# Patient Record
Sex: FEMALE | Race: WHITE | Employment: OTHER | URBAN - METROPOLITAN AREA
[De-identification: names, ages, dates, MRNs, and addresses within clinical notes are randomized per-mention and may not be internally consistent; named-entity substitution may affect disease eponyms.]

---

## 2019-11-11 LAB
ALBUMIN SERPL-MCNC: NORMAL G/DL
ALP BLD-CCNC: NORMAL U/L
ALT SERPL-CCNC: NORMAL U/L
ANION GAP SERPL CALCULATED.3IONS-SCNC: NORMAL MMOL/L
AST SERPL-CCNC: NORMAL U/L
AVERAGE GLUCOSE: 123
BILIRUB SERPL-MCNC: NORMAL MG/DL
BUN BLDV-MCNC: NORMAL MG/DL
CALCIUM SERPL-MCNC: NORMAL MG/DL
CHLORIDE BLD-SCNC: NORMAL MMOL/L
CHOLESTEROL, TOTAL: 173 MG/DL
CHOLESTEROL/HDL RATIO: 4.8
CO2: NORMAL
CREAT SERPL-MCNC: NORMAL MG/DL
GFR CALCULATED: NORMAL
GLUCOSE BLD-MCNC: NORMAL MG/DL
HBA1C MFR BLD: 5.9 %
HDLC SERPL-MCNC: 36 MG/DL (ref 35–70)
LDL CHOLESTEROL CALCULATED: 90 MG/DL (ref 0–160)
NONHDLC SERPL-MCNC: NORMAL MG/DL
POTASSIUM SERPL-SCNC: NORMAL MMOL/L
SODIUM BLD-SCNC: NORMAL MMOL/L
TOTAL PROTEIN: NORMAL
TRIGL SERPL-MCNC: 237 MG/DL
VITAMIN B-12: 403
VITAMIN D 25-HYDROXY: 30
VITAMIN D2, 25 HYDROXY: NORMAL
VITAMIN D3,25 HYDROXY: NORMAL
VLDLC SERPL CALC-MCNC: 47 MG/DL

## 2020-09-10 VITALS
DIASTOLIC BLOOD PRESSURE: 70 MMHG | SYSTOLIC BLOOD PRESSURE: 118 MMHG | TEMPERATURE: 96.5 F | WEIGHT: 222 LBS | BODY MASS INDEX: 31.4 KG/M2 | HEART RATE: 72 BPM | RESPIRATION RATE: 14 BRPM

## 2020-09-24 VITALS
DIASTOLIC BLOOD PRESSURE: 74 MMHG | SYSTOLIC BLOOD PRESSURE: 112 MMHG | RESPIRATION RATE: 14 BRPM | HEART RATE: 70 BPM | BODY MASS INDEX: 31.26 KG/M2 | WEIGHT: 221 LBS | TEMPERATURE: 95.9 F

## 2020-11-09 ENCOUNTER — HOSPITAL ENCOUNTER (OUTPATIENT)
Dept: GENERAL RADIOLOGY | Age: 65
Discharge: HOME | End: 2020-11-11
Payer: MEDICARE

## 2020-11-09 PROCEDURE — 77063 BREAST TOMOSYNTHESIS BI: CPT

## 2021-01-06 ENCOUNTER — TELEPHONE (OUTPATIENT)
Dept: FAMILY MEDICINE CLINIC | Age: 66
End: 2021-01-06

## 2021-01-06 NOTE — TELEPHONE ENCOUNTER
Patient called to RS appt, stating she woke up with a sore throat, headache, matter in her eyes and not feeling well. Informed patient of [facility]. RS appt to 2/15/21.

## 2021-02-15 ENCOUNTER — OFFICE VISIT (OUTPATIENT)
Dept: FAMILY MEDICINE CLINIC | Age: 66
End: 2021-02-15
Payer: MEDICARE

## 2021-02-15 VITALS
HEIGHT: 69 IN | DIASTOLIC BLOOD PRESSURE: 80 MMHG | BODY MASS INDEX: 31.84 KG/M2 | HEART RATE: 105 BPM | OXYGEN SATURATION: 95 % | SYSTOLIC BLOOD PRESSURE: 138 MMHG | WEIGHT: 215 LBS | TEMPERATURE: 98.2 F

## 2021-02-15 DIAGNOSIS — J44.9 CHRONIC OBSTRUCTIVE PULMONARY DISEASE, UNSPECIFIED COPD TYPE (HCC): ICD-10-CM

## 2021-02-15 DIAGNOSIS — Z12.11 COLON CANCER SCREENING: ICD-10-CM

## 2021-02-15 DIAGNOSIS — R06.00 DYSPNEA, UNSPECIFIED TYPE: ICD-10-CM

## 2021-02-15 DIAGNOSIS — G62.9 NEUROPATHY: ICD-10-CM

## 2021-02-15 DIAGNOSIS — Z00.00 ANNUAL PHYSICAL EXAM: Primary | ICD-10-CM

## 2021-02-15 DIAGNOSIS — F32.A DEPRESSION, UNSPECIFIED DEPRESSION TYPE: ICD-10-CM

## 2021-02-15 DIAGNOSIS — I25.10 CORONARY ARTERY DISEASE INVOLVING NATIVE CORONARY ARTERY OF NATIVE HEART WITHOUT ANGINA PECTORIS: ICD-10-CM

## 2021-02-15 DIAGNOSIS — E78.5 HYPERLIPIDEMIA, UNSPECIFIED HYPERLIPIDEMIA TYPE: ICD-10-CM

## 2021-02-15 DIAGNOSIS — M54.50 CHRONIC BILATERAL LOW BACK PAIN WITHOUT SCIATICA: ICD-10-CM

## 2021-02-15 DIAGNOSIS — G89.29 CHRONIC BILATERAL LOW BACK PAIN WITHOUT SCIATICA: ICD-10-CM

## 2021-02-15 DIAGNOSIS — R73.9 HYPERGLYCEMIA: ICD-10-CM

## 2021-02-15 DIAGNOSIS — I10 ESSENTIAL HYPERTENSION: ICD-10-CM

## 2021-02-15 DIAGNOSIS — G47.00 PERSISTENT INSOMNIA: ICD-10-CM

## 2021-02-15 LAB
BILIRUBIN, POC: NORMAL
BLOOD URINE, POC: NORMAL
CLARITY, POC: NORMAL
COLOR, POC: NORMAL
GLUCOSE URINE, POC: NORMAL
KETONES, POC: NORMAL
LEUKOCYTE EST, POC: NORMAL
NITRITE, POC: NORMAL
PH, POC: 5.5
PROTEIN, POC: NORMAL
SPECIFIC GRAVITY, POC: 1.03
UROBILINOGEN, POC: 0.2

## 2021-02-15 PROCEDURE — G0403 EKG FOR INITIAL PREVENT EXAM: HCPCS | Performed by: INTERNAL MEDICINE

## 2021-02-15 PROCEDURE — G8484 FLU IMMUNIZE NO ADMIN: HCPCS | Performed by: INTERNAL MEDICINE

## 2021-02-15 PROCEDURE — 81002 URINALYSIS NONAUTO W/O SCOPE: CPT | Performed by: INTERNAL MEDICINE

## 2021-02-15 PROCEDURE — G0402 INITIAL PREVENTIVE EXAM: HCPCS | Performed by: INTERNAL MEDICINE

## 2021-02-15 RX ORDER — METOPROLOL SUCCINATE 50 MG/1
50 TABLET, EXTENDED RELEASE ORAL 2 TIMES DAILY
Qty: 180 TABLET | Refills: 1 | Status: SHIPPED
Start: 2021-02-15 | End: 2021-05-25 | Stop reason: SDUPTHER

## 2021-02-15 RX ORDER — CLOPIDOGREL BISULFATE 75 MG/1
75 TABLET ORAL DAILY
Qty: 90 TABLET | Refills: 1 | Status: SHIPPED
Start: 2021-02-15 | End: 2021-05-25 | Stop reason: SDUPTHER

## 2021-02-15 RX ORDER — DULOXETIN HYDROCHLORIDE 60 MG/1
60 CAPSULE, DELAYED RELEASE ORAL DAILY
Qty: 90 CAPSULE | Refills: 1 | Status: SHIPPED
Start: 2021-02-15 | End: 2021-05-25 | Stop reason: SDUPTHER

## 2021-02-15 RX ORDER — GABAPENTIN 300 MG/1
600 CAPSULE ORAL NIGHTLY
Qty: 90 CAPSULE | Refills: 1 | Status: SHIPPED
Start: 2021-02-15 | End: 2021-05-25 | Stop reason: SDUPTHER

## 2021-02-15 RX ORDER — HYDROCODONE BITARTRATE AND ACETAMINOPHEN 7.5; 325 MG/1; MG/1
1 TABLET ORAL EVERY 8 HOURS PRN
Qty: 90 TABLET | Refills: 0 | Status: SHIPPED | OUTPATIENT
Start: 2021-02-15 | End: 2021-03-17

## 2021-02-15 RX ORDER — CLOPIDOGREL BISULFATE 75 MG/1
75 TABLET ORAL DAILY
COMMUNITY
End: 2021-02-15 | Stop reason: SDUPTHER

## 2021-02-15 RX ORDER — HYDROCODONE BITARTRATE AND ACETAMINOPHEN 7.5; 325 MG/1; MG/1
1 TABLET ORAL EVERY 6 HOURS PRN
COMMUNITY
End: 2021-02-15 | Stop reason: SDUPTHER

## 2021-02-15 RX ORDER — GABAPENTIN 100 MG/1
200 CAPSULE ORAL 2 TIMES DAILY
Qty: 360 CAPSULE | Refills: 1 | Status: SHIPPED
Start: 2021-02-15 | End: 2021-05-25

## 2021-02-15 RX ORDER — ATORVASTATIN CALCIUM 10 MG/1
10 TABLET, FILM COATED ORAL DAILY
Qty: 90 TABLET | Refills: 1 | Status: SHIPPED
Start: 2021-02-15 | End: 2021-05-25 | Stop reason: SDUPTHER

## 2021-02-15 SDOH — ECONOMIC STABILITY: FOOD INSECURITY: WITHIN THE PAST 12 MONTHS, YOU WORRIED THAT YOUR FOOD WOULD RUN OUT BEFORE YOU GOT MONEY TO BUY MORE.: NEVER TRUE

## 2021-02-15 SDOH — ECONOMIC STABILITY: INCOME INSECURITY: HOW HARD IS IT FOR YOU TO PAY FOR THE VERY BASICS LIKE FOOD, HOUSING, MEDICAL CARE, AND HEATING?: NOT ASKED

## 2021-02-15 SDOH — ECONOMIC STABILITY: FOOD INSECURITY: WITHIN THE PAST 12 MONTHS, THE FOOD YOU BOUGHT JUST DIDN'T LAST AND YOU DIDN'T HAVE MONEY TO GET MORE.: NEVER TRUE

## 2021-02-15 ASSESSMENT — PATIENT HEALTH QUESTIONNAIRE - PHQ9
SUM OF ALL RESPONSES TO PHQ QUESTIONS 1-9: 0
SUM OF ALL RESPONSES TO PHQ9 QUESTIONS 1 & 2: 0
SUM OF ALL RESPONSES TO PHQ QUESTIONS 1-9: 0

## 2021-02-15 NOTE — PROGRESS NOTES
Subjective:     Chief Complaint   Patient presents with    Annual Exam   Patient here for follow-up on hypertension, coronary disease and peripheral neuropathy  Has trouble sleeping at night she has cut down the caffeine still not helping  Seen by cardiologist in December 2020 she follows once a year no further changes    Seen by Dr. Jazzy Leal he could not do complete colonoscopy due to tortuous colon and adhesions she has done in [facility] by Dr. 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     Neuropathy         Social History     Socioeconomic History    Marital status:       Spouse name: Not on file    Number of children: Not on file    Years of education: Not on file    Highest education level: Not on file   Occupational History    Not on file   Social Needs    Financial resource strain: Not on file    Food insecurity     Worry: Never true     Inability: Never true    Transportation needs     Medical: No     Non-medical: No   Tobacco Use    Smoking status: Former Smoker     Packs/day: 1.00     Years: 27.00     Pack years: 27.00     Quit date: 2011     Years since quitting: 10.1    Smokeless tobacco: Never Used   Substance and Sexual Activity    Alcohol use: No    Drug use: No    Sexual activity: Not on file   Lifestyle    Physical activity     Days per week: Not on file     Minutes per session: Not on file    Stress: Not on file   Relationships    Social connections     Talks on phone: Not on file     Gets together: Not on file Neck: no JVD adenopathy no bruit  Heart:  RRR, no murmurs, gallops, or rubs. Lungs:    no wheeze, rales or rhonchi  Abd: bowel sounds present, nontender, nondistended, no masses  Extrem:  No clubbing, cyanosis, or edema  Neuro: AAOx3,No Focal deficit has chronic peripheral neuropathy doing better  Psychological: no depression or anxiety   Has chronic back pain radiculopathy uses Norco as needed declined to go to pain clinic    Current Outpatient Medications   Medication Sig Dispense Refill    gabapentin (NEURONTIN) 300 MG capsule Take 2 capsules by mouth nightly for 180 days. 90 capsule 1    gabapentin (NEURONTIN) 100 MG capsule Take 2 capsules by mouth 2 times daily for 180 days. 360 capsule 1    DULoxetine (CYMBALTA) 60 MG extended release capsule Take 1 capsule by mouth daily 90 capsule 1    clopidogrel (PLAVIX) 75 MG tablet Take 1 tablet by mouth daily 90 tablet 1    atorvastatin (LIPITOR) 10 MG tablet Take 1 tablet by mouth daily 90 tablet 1    HYDROcodone-acetaminophen (NORCO) 7.5-325 MG per tablet Take 1 tablet by mouth every 8 hours as needed for Pain for up to 30 days. 90 tablet 0    metoprolol succinate (TOPROL XL) 50 MG extended release tablet Take 1 tablet by mouth 2 times daily 180 tablet 1    metoprolol (LOPRESSOR) 25 MG tablet Take 25 mg by mouth 2 times daily      aspirin 81 MG tablet Take 81 mg by mouth daily       No current facility-administered medications for this visit.          Last 3 BMP  Lab Results   Component Value Date/Time     02/15/2016 10:12 AM     02/17/2014 09:15 AM     02/13/2012 10:20 AM    K 4.2 02/15/2016 10:12 AM    K 4.3 02/17/2014 09:15 AM    K 4.5 02/13/2012 10:20 AM     02/15/2016 10:12 AM     02/17/2014 09:15 AM     02/13/2012 10:20 AM    CO2 26 02/15/2016 10:12 AM    CO2 30 (H) 02/17/2014 09:15 AM    CO2 27 02/13/2012 10:20 AM    BUN 8 02/15/2016 10:12 AM    BUN 14 02/17/2014 09:15 AM    BUN 11 02/13/2012 10:20 AM MCV 89.4 02/15/2016 10:12 AM    MCH 29.2 02/15/2016 10:12 AM    MCHC 32.6 02/15/2016 10:12 AM    RDW 14.6 02/15/2016 10:12 AM     02/15/2016 10:12 AM    MPV 8.5 02/15/2016 10:12 AM       A1C:  Lab Results   Component Value Date/Time    LABA1C 5.9 11/11/2019       Lipid panel:  Lab Results   Component Value Date    CHOL 173 11/11/2019    CHOL 370 02/15/2016    CHOL 265 02/17/2014    CHOL 254 02/13/2012    TRIG 237 11/11/2019    TRIG 566 02/15/2016    TRIG 407 02/17/2014    HDL 36 11/11/2019    HDL 37 02/15/2016    HDL 37 02/17/2014        Lab Results   Component Value Date/Time    PROT 6.6 02/15/2016 10:12 AM    PROT 7.1 02/17/2014 09:15 AM    PROT 7.0 02/13/2012 10:20 AM       No results found for: MG      Juan Washington was seen today for annual exam.    Diagnoses and all orders for this visit:    Annual physical exam  -     EKG 12 Lead; Future  -     POCT Urinalysis no Micro  -     EKG 12 Lead    Essential hypertension    Coronary artery disease involving native coronary artery of native heart without angina pectoris    Hyperlipidemia, unspecified hyperlipidemia type  -     Comprehensive Metabolic Panel; Future  -     Lipid Panel; Future  -     TSH without Reflex; Future    Neuropathy  -     HYDROcodone-acetaminophen (NORCO) 7.5-325 MG per tablet; Take 1 tablet by mouth every 8 hours as needed for Pain for up to 30 days. -     CBC Auto Differential; Future    Depression, unspecified depression type    Chronic bilateral low back pain without sciatica  -     HYDROcodone-acetaminophen (NORCO) 7.5-325 MG per tablet; Take 1 tablet by mouth every 8 hours as needed for Pain for up to 30 days. -     CBC Auto Differential; Future    Colon cancer screening  -     External Referral To Gastroenterology    Hyperglycemia  -     Hemoglobin A1C; Future    Chronic obstructive pulmonary disease, unspecified COPD type ([facility]  -     XR CHEST (2 VW); Future    Dyspnea, unspecified type  -     XR CHEST (2 VW);  Future Persistent insomnia    Other orders  -     gabapentin (NEURONTIN) 300 MG capsule; Take 2 capsules by mouth nightly for 180 days.  -     gabapentin (NEURONTIN) 100 MG capsule; Take 2 capsules by mouth 2 times daily for 180 days. -     DULoxetine (CYMBALTA) 60 MG extended release capsule; Take 1 capsule by mouth daily  -     clopidogrel (PLAVIX) 75 MG tablet; Take 1 tablet by mouth daily  -     atorvastatin (LIPITOR) 10 MG tablet; Take 1 tablet by mouth daily  -     metoprolol succinate (TOPROL XL) 50 MG extended release tablet; Take 1 tablet by mouth 2 times daily       There is no problem list on file for this patient. Plan: Referred to [facility] [employer] [facility] Dr. [address] Pulliam for follow-up colonoscopy    Coronary artery disease is stable continue metoprolol 50 mg twice a day  Continue Lipitor and Plavix seen by cardiologist 2 months ago  EKG shows sinus rhythm occasional PVC    Chest x-ray heavy smoker in the past and COPD    Continue gabapentin cardiomyopathy doing better stable she takes gabapentin 100 mg 2 twice daily and 300 mg 2 tablets at night    She had a mammogram in November which was okay    Depression very well controlled on Cymbalta    Lifestyle diet modification discussed      Vaccination discussed      Check CBC CMP lipid A1c      Call if any new symptoms    Over-the-counter melatonin for insomnia        Return in about 3 months (around 5/15/2021).        Corrine Wood MD  3:05 PM  2/15/2021     DE

## 2021-03-25 RX ORDER — CYCLOBENZAPRINE HCL 10 MG
TABLET ORAL
COMMUNITY
Start: 2021-03-24 | End: 2021-03-25 | Stop reason: SDUPTHER

## 2021-03-25 RX ORDER — CYCLOBENZAPRINE HCL 10 MG
10 TABLET ORAL DAILY
Qty: 90 TABLET | Refills: 0 | Status: SHIPPED
Start: 2021-03-25 | End: 2021-05-25 | Stop reason: SDUPTHER

## 2021-05-25 ENCOUNTER — OFFICE VISIT (OUTPATIENT)
Dept: FAMILY MEDICINE CLINIC | Age: 66
End: 2021-05-25
Payer: MEDICARE

## 2021-05-25 VITALS
SYSTOLIC BLOOD PRESSURE: 120 MMHG | TEMPERATURE: 98 F | WEIGHT: 205 LBS | HEART RATE: 47 BPM | OXYGEN SATURATION: 95 % | BODY MASS INDEX: 30.27 KG/M2 | DIASTOLIC BLOOD PRESSURE: 80 MMHG

## 2021-05-25 DIAGNOSIS — I25.10 CORONARY ARTERY DISEASE INVOLVING NATIVE CORONARY ARTERY OF NATIVE HEART WITHOUT ANGINA PECTORIS: ICD-10-CM

## 2021-05-25 DIAGNOSIS — R73.9 HYPERGLYCEMIA: ICD-10-CM

## 2021-05-25 DIAGNOSIS — G62.9 NEUROPATHY: ICD-10-CM

## 2021-05-25 DIAGNOSIS — M19.90 INFLAMMATORY ARTHRITIS: Primary | ICD-10-CM

## 2021-05-25 DIAGNOSIS — M15.9 GENERALIZED OSTEOARTHRITIS: ICD-10-CM

## 2021-05-25 DIAGNOSIS — E78.5 HYPERLIPIDEMIA, UNSPECIFIED HYPERLIPIDEMIA TYPE: ICD-10-CM

## 2021-05-25 DIAGNOSIS — R05.9 COUGH: ICD-10-CM

## 2021-05-25 DIAGNOSIS — M79.10 MYALGIA: ICD-10-CM

## 2021-05-25 PROCEDURE — 3017F COLORECTAL CA SCREEN DOC REV: CPT | Performed by: INTERNAL MEDICINE

## 2021-05-25 PROCEDURE — 99214 OFFICE O/P EST MOD 30 MIN: CPT | Performed by: INTERNAL MEDICINE

## 2021-05-25 PROCEDURE — G8399 PT W/DXA RESULTS DOCUMENT: HCPCS | Performed by: INTERNAL MEDICINE

## 2021-05-25 PROCEDURE — 1090F PRES/ABSN URINE INCON ASSESS: CPT | Performed by: INTERNAL MEDICINE

## 2021-05-25 PROCEDURE — 1123F ACP DISCUSS/DSCN MKR DOCD: CPT | Performed by: INTERNAL MEDICINE

## 2021-05-25 PROCEDURE — 4040F PNEUMOC VAC/ADMIN/RCVD: CPT | Performed by: INTERNAL MEDICINE

## 2021-05-25 PROCEDURE — G8427 DOCREV CUR MEDS BY ELIG CLIN: HCPCS | Performed by: INTERNAL MEDICINE

## 2021-05-25 PROCEDURE — 4004F PT TOBACCO SCREEN RCVD TLK: CPT | Performed by: INTERNAL MEDICINE

## 2021-05-25 PROCEDURE — G8417 CALC BMI ABV UP PARAM F/U: HCPCS | Performed by: INTERNAL MEDICINE

## 2021-05-25 RX ORDER — CLOPIDOGREL BISULFATE 75 MG/1
75 TABLET ORAL DAILY
Qty: 90 TABLET | Refills: 1 | Status: SHIPPED
Start: 2021-05-25 | End: 2021-12-02 | Stop reason: SDUPTHER

## 2021-05-25 RX ORDER — HYDROCODONE BITARTRATE AND ACETAMINOPHEN 7.5; 325 MG/1; MG/1
1 TABLET ORAL EVERY 8 HOURS PRN
Qty: 90 TABLET | Refills: 0 | Status: SHIPPED | OUTPATIENT
Start: 2021-05-25 | End: 2021-06-24

## 2021-05-25 RX ORDER — DULOXETIN HYDROCHLORIDE 60 MG/1
60 CAPSULE, DELAYED RELEASE ORAL DAILY
Qty: 90 CAPSULE | Refills: 1 | Status: SHIPPED
Start: 2021-05-25 | End: 2021-12-02 | Stop reason: SDUPTHER

## 2021-05-25 RX ORDER — CYCLOBENZAPRINE HCL 10 MG
10 TABLET ORAL DAILY
Qty: 90 TABLET | Refills: 0 | Status: SHIPPED
Start: 2021-05-25 | End: 2021-12-02 | Stop reason: SDUPTHER

## 2021-05-25 RX ORDER — GABAPENTIN 300 MG/1
300 CAPSULE ORAL 2 TIMES DAILY
Qty: 60 CAPSULE | Refills: 5 | Status: SHIPPED
Start: 2021-05-25 | End: 2021-08-25

## 2021-05-25 RX ORDER — METOPROLOL SUCCINATE 50 MG/1
50 TABLET, EXTENDED RELEASE ORAL 2 TIMES DAILY
Qty: 180 TABLET | Refills: 1 | Status: SHIPPED
Start: 2021-05-25 | End: 2021-12-02 | Stop reason: SDUPTHER

## 2021-05-25 RX ORDER — CELECOXIB 200 MG/1
200 CAPSULE ORAL DAILY
Qty: 30 CAPSULE | Refills: 3 | Status: SHIPPED
Start: 2021-05-25 | End: 2021-08-25 | Stop reason: SDUPTHER

## 2021-05-25 RX ORDER — GABAPENTIN 100 MG/1
200 CAPSULE ORAL 2 TIMES DAILY
Qty: 360 CAPSULE | Refills: 1 | Status: CANCELLED | OUTPATIENT
Start: 2021-05-25 | End: 2021-11-21

## 2021-05-25 RX ORDER — ATORVASTATIN CALCIUM 10 MG/1
10 TABLET, FILM COATED ORAL DAILY
Qty: 90 TABLET | Refills: 1 | Status: SHIPPED
Start: 2021-05-25 | End: 2021-12-02 | Stop reason: SDUPTHER

## 2021-05-25 RX ORDER — GABAPENTIN 300 MG/1
300 CAPSULE ORAL 3 TIMES DAILY
Qty: 270 CAPSULE | Refills: 1 | Status: SHIPPED
Start: 2021-05-25 | End: 2021-12-02 | Stop reason: SDUPTHER

## 2021-05-25 NOTE — PROGRESS NOTES
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[facility]     Neuropathy         Social History     Socioeconomic History    Marital status:      Spouse name: Not on file    Number of children: Not on file    Years of education: Not on file    Highest education level: Not on file   Occupational History    Not on file   Tobacco Use    Smoking status: Former Smoker     Packs/day: 1.00     Years: 27.00     Pack years: 27.00     Quit date: 2011     Years since quitting: 10.4    Smokeless tobacco: Never Used   Substance and Sexual Activity    Alcohol use: No    Drug use: No    Sexual activity: Not on file   Other Topics Concern    Not on file   Social History Narrative    Not on file     Social Determinants of Health     Financial Resource Strain:     Difficulty of Paying Living Expenses:    Food Insecurity: No Food Insecurity    Worried About Running Out of Food in the Last Year: Never true    Sadie of Food in the Last Year: Never true   Transportation Needs: No Transportation Needs    Lack of Transportation (Medical): No    Lack of Transportation (Non-Medical):  No   Physical Activity:     Days of Exercise per Week:     Minutes of Exercise per Session:    Stress:     Feeling of Stress :    Social Connections:     Frequency of Communication with Friends and Family:     Frequency of Social Gatherings with Friends and Family:     Attends Mu-ism Services:     Active Member of Clubs or Organizations:     Attends Club or Organization Meetings:     Marital Status:    Intimate Partner Violence:     Fear of Current or Ex-Partner:     Emotionally Abused:     Physically Abused:     Sexually Abused:         Past Surgical History:   Procedure Laterality Date    ABDOMINAL EXPLORATION SURGERY      APPENDECTOMY      CHOLECYSTECTOMY      CORONARY ANGIOPLASTY WITH STENT PLACEMENT  2014    HYSTERECTOMY      NECK SURGERY      PARTIAL HYSTERECTOMY      TONSILLECTOMY          Family History   Problem Relation Age of Onset    Early Death Mother     Heart Disease Mother     Colon Cancer Father 80        Allergies   Allergen Reactions    Iodine Anaphylaxis and Hives    Evista [Raloxifene Hcl]         ROS  No acute distress  Cardiac: Denies any chest pain or palpitation  Seen by cardiologist December 2020 he sees her once a year  Stent RCA 2014 by Dr. Sonal Khoury  Respiratory: Denies any cough or shortness of breath  Did not do the chest x-ray  Heavy smoker in the past, history of COPD  GI: No abdominal pain. Denies any nausea vomiting or diarrhea  Colonoscopy in [facility] [employer] [facility] clinic March 2021 done by Dr. Brianna Garcia colon polyp adenoma repeat in 5 years  : Denies any dysuria frequency or hematuria  Neuro: No headache or dizziness  Feet are very sensitive due to neuropathy  Endocrine: No diabetes  Skin: normal  No recent weight gain or weight loss  Denies any change in vision    Chronic back pain, shoulder pain, neck pain,    Objective:    /80   Pulse (!) 47   Temp 98 °F (36.7 °C)   Wt 205 lb (93 kg)   SpO2 95%   BMI 30.27 kg/m²     Constitutional: Alert awake and oriented  Eyes: Pupils equal bilaterally. Extraocular muscles intact  Neck: no JVD adenopathy no bruit  Heart:  RRR, no murmurs, gallops, or rubs.   Lungs:    no wheeze, rales or rhonchi  Abd: bowel sounds present, nontender, nondistended, no masses  Extrem:  No clubbing, cyanosis, or edema  Neuro: AAOx3,No Focal deficit  Psychological: no depression or anxiety   Musculoskeletal slight deformity of the hands  Range of motion shoulder elbow hips are painful    Current Outpatient 02/17/2014 09:15 AM    CALCIUM 9.6 02/13/2012 10:20 AM       Last 3 CMP:    Lab Results   Component Value Date/Time     02/15/2016 10:12 AM     02/17/2014 09:15 AM     02/13/2012 10:20 AM    K 4.2 02/15/2016 10:12 AM    K 4.3 02/17/2014 09:15 AM    K 4.5 02/13/2012 10:20 AM     02/15/2016 10:12 AM     02/17/2014 09:15 AM     02/13/2012 10:20 AM    CO2 26 02/15/2016 10:12 AM    CO2 30 (H) 02/17/2014 09:15 AM    CO2 27 02/13/2012 10:20 AM    BUN 8 02/15/2016 10:12 AM    BUN 14 02/17/2014 09:15 AM    BUN 11 02/13/2012 10:20 AM    CREATININE 0.7 02/15/2016 10:12 AM    CREATININE 0.7 02/17/2014 09:15 AM    CREATININE 0.7 02/13/2012 10:20 AM    GLUCOSE 111 (H) 02/15/2016 10:12 AM    GLUCOSE 101 02/17/2014 09:15 AM    GLUCOSE 97 02/13/2012 10:20 AM    CALCIUM 8.9 02/15/2016 10:12 AM    CALCIUM 9.4 02/17/2014 09:15 AM    CALCIUM 9.6 02/13/2012 10:20 AM    PROT 6.6 02/15/2016 10:12 AM    PROT 7.1 02/17/2014 09:15 AM    PROT 7.0 02/13/2012 10:20 AM    LABALBU 4.2 02/15/2016 10:12 AM    LABALBU 4.2 02/17/2014 09:15 AM    LABALBU 4.5 02/13/2012 10:20 AM    BILITOT 0.3 02/15/2016 10:12 AM    BILITOT 0.2 02/17/2014 09:15 AM    BILITOT 0.3 02/13/2012 10:20 AM    ALKPHOS 111 (H) 02/15/2016 10:12 AM    ALKPHOS 100 02/17/2014 09:15 AM    ALKPHOS 108 02/13/2012 10:20 AM    AST 25 02/15/2016 10:12 AM    AST 18 02/17/2014 09:15 AM    AST 17 02/13/2012 10:20 AM    ALT 23 02/15/2016 10:12 AM    ALT 17 02/17/2014 09:15 AM    ALT 18 02/13/2012 10:20 AM        CBC:   Lab Results   Component Value Date/Time    WBC 4.7 02/15/2016 10:12 AM    RBC 5.04 02/15/2016 10:12 AM    HGB 14.7 02/15/2016 10:12 AM    HCT 45.0 02/15/2016 10:12 AM    MCV 89.4 02/15/2016 10:12 AM    MCH 29.2 02/15/2016 10:12 AM    MCHC 32.6 02/15/2016 10:12 AM    RDW 14.6 02/15/2016 10:12 AM     02/15/2016 10:12 AM    MPV 8.5 02/15/2016 10:12 AM       A1C:  Lab Results   Component Value Date/Time    LABA1C 5.9 11/11/2019 12:00 AM Lipid panel:  Lab Results   Component Value Date    CHOL 173 11/11/2019    CHOL 370 02/15/2016    CHOL 265 02/17/2014    CHOL 254 02/13/2012    TRIG 237 11/11/2019    TRIG 566 02/15/2016    TRIG 407 02/17/2014    HDL 36 11/11/2019    HDL 37 02/15/2016    HDL 37 02/17/2014        Lab Results   Component Value Date/Time    PROT 6.6 02/15/2016 10:12 AM    PROT 7.1 02/17/2014 09:15 AM    PROT 7.0 02/13/2012 10:20 AM       No results found for: MG      Assessment. Carmela Davila was seen today for joint pain and discuss medications. Diagnoses and all orders for this visit:    Inflammatory arthritis  -     CBC WITH AUTO DIFFERENTIAL; Future  -     SEDIMENTATION RATE; Future  -     RHEUMATOID FACTOR; Future  -     C-REACTIVE PROTEIN; Future  -     DONNA; Future  -     HYDROcodone-acetaminophen (NORCO) 7.5-325 MG per tablet; Take 1 tablet by mouth every 8 hours as needed for Pain for up to 30 days. Intended supply: 3 days. Take lowest dose possible to manage pain    Neuropathy  -     VITAMIN B12 & FOLATE; Future  -     HYDROcodone-acetaminophen (NORCO) 7.5-325 MG per tablet; Take 1 tablet by mouth every 8 hours as needed for Pain for up to 30 days. Intended supply: 3 days. Take lowest dose possible to manage pain    Hyperlipidemia, unspecified hyperlipidemia type  -     COMPREHENSIVE METABOLIC PANEL; Future  -     LIPID PANEL; Future  -     TSH; Future    Coronary artery disease involving native coronary artery of native heart without angina pectoris    Hyperglycemia  -     HEMOGLOBIN A1C; Future    Cough  -     XR CHEST (2 VW); Future    Generalized osteoarthritis    Other orders  -     metoprolol succinate (TOPROL XL) 50 MG extended release tablet; Take 1 tablet by mouth 2 times daily  -     gabapentin (NEURONTIN) 300 MG capsule; Take 1 capsule by mouth 3 times daily for 90 days. -     DULoxetine (CYMBALTA) 60 MG extended release capsule; Take 1 capsule by mouth daily  -     cyclobenzaprine (FLEXERIL) 10 MG tablet;  Take 1 tablet by mouth daily  -     clopidogrel (PLAVIX) 75 MG tablet; Take 1 tablet by mouth daily  -     atorvastatin (LIPITOR) 10 MG tablet; Take 1 tablet by mouth daily  -     gabapentin (NEURONTIN) 300 MG capsule; Take 1 capsule by mouth 2 times daily for 180 days. Intended supply: 30 days  -     celecoxib (CELEBREX) 200 MG capsule; Take 1 capsule by mouth daily       Patient Active Problem List   Diagnosis    Neuropathy    Hyperlipidemia    Coronary artery disease involving native coronary artery of native heart without angina pectoris    Generalized osteoarthritis       Plan: Generalized osteoarthritis rule out inflammatory arthritis check DONNA, ESR, CRP, and sed rate    Generalized arthritis and chronic back pain try Celebrex 200 mg daily see if that helps    Neuropathy getting worse check A1c, B12, increase gabapentin to 300 in the morning 600 night      CAD stable sees cardiologist Dr. Navarro Miss      Mild cough with history of COPD and heavy smoker x-ray chest requested      Chronic back pain and generalized arthritis prescription for Norco 7.5/325 30 tablets    Hyperlipidemia check CMP and lipid profile    Check CPK wrist generalized pain      Depression anxiety doing better with Cymbalta    Return in about 3 months (around 8/25/2021).        Deanne Grant MD  2:56 PM  5/25/2021     DE

## 2021-05-26 RX ORDER — CELECOXIB 200 MG/1
200 CAPSULE ORAL DAILY
Qty: 90 CAPSULE | OUTPATIENT
Start: 2021-05-26

## 2021-06-30 ENCOUNTER — TELEPHONE (OUTPATIENT)
Dept: FAMILY MEDICINE CLINIC | Age: 66
End: 2021-06-30

## 2021-06-30 NOTE — TELEPHONE ENCOUNTER
Left message for patient that dr changed meds at last visit and called in gabapentin 300mg one in the morning and 2 at bedtime.  Medication was sent to [facility]

## 2021-07-12 DIAGNOSIS — R73.9 HYPERGLYCEMIA: ICD-10-CM

## 2021-07-12 DIAGNOSIS — M79.10 MYALGIA: ICD-10-CM

## 2021-07-12 DIAGNOSIS — G62.9 NEUROPATHY: ICD-10-CM

## 2021-07-12 DIAGNOSIS — E78.5 HYPERLIPIDEMIA, UNSPECIFIED HYPERLIPIDEMIA TYPE: ICD-10-CM

## 2021-07-12 DIAGNOSIS — M19.90 INFLAMMATORY ARTHRITIS: ICD-10-CM

## 2021-07-12 LAB
ALBUMIN SERPL-MCNC: 3.8 G/DL (ref 3.5–5.2)
ALP BLD-CCNC: 109 U/L (ref 35–104)
ALT SERPL-CCNC: 14 U/L (ref 0–32)
ANION GAP SERPL CALCULATED.3IONS-SCNC: 12 MMOL/L (ref 7–16)
AST SERPL-CCNC: 15 U/L (ref 0–31)
BASOPHILS ABSOLUTE: 0.04 E9/L (ref 0–0.2)
BASOPHILS RELATIVE PERCENT: 0.5 % (ref 0–2)
BILIRUB SERPL-MCNC: <0.2 MG/DL (ref 0–1.2)
BUN BLDV-MCNC: 11 MG/DL (ref 6–23)
C-REACTIVE PROTEIN: 0.7 MG/DL (ref 0–0.4)
CALCIUM SERPL-MCNC: 9.1 MG/DL (ref 8.6–10.2)
CHLORIDE BLD-SCNC: 105 MMOL/L (ref 98–107)
CHOLESTEROL, TOTAL: 207 MG/DL (ref 0–199)
CO2: 24 MMOL/L (ref 22–29)
CREAT SERPL-MCNC: 0.6 MG/DL (ref 0.5–1)
EOSINOPHILS ABSOLUTE: 0.16 E9/L (ref 0.05–0.5)
EOSINOPHILS RELATIVE PERCENT: 1.9 % (ref 0–6)
FOLATE: 6.4 NG/ML (ref 4.8–24.2)
GFR AFRICAN AMERICAN: >60
GFR NON-AFRICAN AMERICAN: >60 ML/MIN/1.73
GLUCOSE BLD-MCNC: 113 MG/DL (ref 74–99)
HBA1C MFR BLD: 5.8 % (ref 4–5.6)
HCT VFR BLD CALC: 45.8 % (ref 34–48)
HDLC SERPL-MCNC: 30 MG/DL
HEMOGLOBIN: 14.3 G/DL (ref 11.5–15.5)
IMMATURE GRANULOCYTES #: 0.03 E9/L
IMMATURE GRANULOCYTES %: 0.4 % (ref 0–5)
LDL CHOLESTEROL CALCULATED: 110 MG/DL (ref 0–99)
LYMPHOCYTES ABSOLUTE: 2.1 E9/L (ref 1.5–4)
LYMPHOCYTES RELATIVE PERCENT: 25.1 % (ref 20–42)
MCH RBC QN AUTO: 28.5 PG (ref 26–35)
MCHC RBC AUTO-ENTMCNC: 31.2 % (ref 32–34.5)
MCV RBC AUTO: 91.2 FL (ref 80–99.9)
MONOCYTES ABSOLUTE: 0.63 E9/L (ref 0.1–0.95)
MONOCYTES RELATIVE PERCENT: 7.5 % (ref 2–12)
NEUTROPHILS ABSOLUTE: 5.42 E9/L (ref 1.8–7.3)
NEUTROPHILS RELATIVE PERCENT: 64.6 % (ref 43–80)
PDW BLD-RTO: 12.8 FL (ref 11.5–15)
PLATELET # BLD: 281 E9/L (ref 130–450)
PMV BLD AUTO: 10.7 FL (ref 7–12)
POTASSIUM SERPL-SCNC: 4.1 MMOL/L (ref 3.5–5)
RBC # BLD: 5.02 E12/L (ref 3.5–5.5)
RHEUMATOID FACTOR: <10 IU/ML (ref 0–13)
SEDIMENTATION RATE, ERYTHROCYTE: 19 MM/HR (ref 0–20)
SODIUM BLD-SCNC: 141 MMOL/L (ref 132–146)
TOTAL CK: 48 U/L (ref 20–180)
TOTAL PROTEIN: 6.8 G/DL (ref 6.4–8.3)
TRIGL SERPL-MCNC: 335 MG/DL (ref 0–149)
TSH SERPL DL<=0.05 MIU/L-ACNC: <0.01 UIU/ML (ref 0.27–4.2)
VITAMIN B-12: 395 PG/ML (ref 211–946)
VLDLC SERPL CALC-MCNC: 67 MG/DL
WBC # BLD: 8.4 E9/L (ref 4.5–11.5)

## 2021-07-13 LAB — ANTI-NUCLEAR ANTIBODY (ANA): NEGATIVE

## 2021-08-25 ENCOUNTER — OFFICE VISIT (OUTPATIENT)
Dept: FAMILY MEDICINE CLINIC | Age: 66
End: 2021-08-25
Payer: MEDICARE

## 2021-08-25 VITALS
TEMPERATURE: 97.4 F | DIASTOLIC BLOOD PRESSURE: 70 MMHG | OXYGEN SATURATION: 94 % | BODY MASS INDEX: 28.8 KG/M2 | SYSTOLIC BLOOD PRESSURE: 120 MMHG | HEART RATE: 98 BPM | WEIGHT: 195 LBS

## 2021-08-25 DIAGNOSIS — G62.9 NEUROPATHY: ICD-10-CM

## 2021-08-25 DIAGNOSIS — E78.5 HYPERLIPIDEMIA, UNSPECIFIED HYPERLIPIDEMIA TYPE: ICD-10-CM

## 2021-08-25 DIAGNOSIS — I25.10 CORONARY ARTERY DISEASE INVOLVING NATIVE CORONARY ARTERY OF NATIVE HEART WITHOUT ANGINA PECTORIS: ICD-10-CM

## 2021-08-25 DIAGNOSIS — K57.92 DIVERTICULITIS: Primary | ICD-10-CM

## 2021-08-25 DIAGNOSIS — M54.50 CHRONIC BILATERAL LOW BACK PAIN WITHOUT SCIATICA: ICD-10-CM

## 2021-08-25 DIAGNOSIS — R63.4 WEIGHT LOSS: ICD-10-CM

## 2021-08-25 DIAGNOSIS — E05.90 HYPERTHYROIDISM: ICD-10-CM

## 2021-08-25 DIAGNOSIS — G89.29 CHRONIC BILATERAL LOW BACK PAIN WITHOUT SCIATICA: ICD-10-CM

## 2021-08-25 PROCEDURE — 1123F ACP DISCUSS/DSCN MKR DOCD: CPT | Performed by: INTERNAL MEDICINE

## 2021-08-25 PROCEDURE — G8427 DOCREV CUR MEDS BY ELIG CLIN: HCPCS | Performed by: INTERNAL MEDICINE

## 2021-08-25 PROCEDURE — G8417 CALC BMI ABV UP PARAM F/U: HCPCS | Performed by: INTERNAL MEDICINE

## 2021-08-25 PROCEDURE — 1090F PRES/ABSN URINE INCON ASSESS: CPT | Performed by: INTERNAL MEDICINE

## 2021-08-25 PROCEDURE — 4004F PT TOBACCO SCREEN RCVD TLK: CPT | Performed by: INTERNAL MEDICINE

## 2021-08-25 PROCEDURE — 4040F PNEUMOC VAC/ADMIN/RCVD: CPT | Performed by: INTERNAL MEDICINE

## 2021-08-25 PROCEDURE — 3017F COLORECTAL CA SCREEN DOC REV: CPT | Performed by: INTERNAL MEDICINE

## 2021-08-25 PROCEDURE — G8399 PT W/DXA RESULTS DOCUMENT: HCPCS | Performed by: INTERNAL MEDICINE

## 2021-08-25 PROCEDURE — 99214 OFFICE O/P EST MOD 30 MIN: CPT | Performed by: INTERNAL MEDICINE

## 2021-08-25 RX ORDER — CELECOXIB 200 MG/1
200 CAPSULE ORAL DAILY
Qty: 90 CAPSULE | Refills: 0 | Status: SHIPPED
Start: 2021-08-25 | End: 2021-10-22

## 2021-08-25 RX ORDER — HYDROCODONE BITARTRATE AND ACETAMINOPHEN 7.5; 325 MG/1; MG/1
1 TABLET ORAL EVERY 8 HOURS PRN
Qty: 90 TABLET | Refills: 0 | Status: SHIPPED | OUTPATIENT
Start: 2021-08-25 | End: 2021-09-24

## 2021-08-25 RX ORDER — METRONIDAZOLE 500 MG/1
500 TABLET ORAL 3 TIMES DAILY
Qty: 30 TABLET | Refills: 0 | Status: SHIPPED | OUTPATIENT
Start: 2021-08-25 | End: 2021-09-04

## 2021-08-25 RX ORDER — CIPROFLOXACIN 500 MG/1
500 TABLET, FILM COATED ORAL 2 TIMES DAILY
Qty: 14 TABLET | Refills: 0 | Status: SHIPPED | OUTPATIENT
Start: 2021-08-25 | End: 2021-09-01

## 2021-08-25 NOTE — PROGRESS NOTES
Subjective:     Chief Complaint   Patient presents with    Abdominal Pain    Diarrhea   Complains of left lower quadrant pain for the past 2 weeks,  Has loose stools and diarrhea for the past 2 weeks, she has no control with the bowels, sometimes cannot control the bowel, is spoiled her close,  She had a colonoscopy in March 2021 [place] [facility] with Dr. 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     Neuropathy         Social History     Socioeconomic History    Marital status:      Spouse name: Not on file    Number of children: Not on file    Years of education: Not on file    Highest education level: Not on file   Occupational History    Not on file   Tobacco Use    Smoking status: Former Smoker     Packs/day: 1.00     Years: 27.00     Pack years: 27.00     Quit date: 2011     Years since quitting: 10.6    Smokeless tobacco: Never Used   Substance and Sexual Activity    Alcohol use: No    Drug use: No    Sexual activity: Not on file   Other Topics Concern    Not on file   Social History Narrative    Not on file     Social Determinants of Health     Financial Resource Strain:     Difficulty of Paying Living Expenses:    Food Insecurity: No Food Insecurity    Worried About Running Out of Food in the Last Year: Never true    Sadie of Food in the Last Year: Never true   Transportation Needs: No Transportation Needs    Lack of Transportation (Medical): No    Lack of Transportation (Non-Medical):  No   Physical Activity:     Days of Exercise per Week:     Minutes of Exercise per Session:    Stress:     Feeling of Stress : Social Connections:     Frequency of Communication with Friends and Family:     Frequency of Social Gatherings with Friends and Family:     Attends Mu-ism Services:     Active Member of Clubs or Organizations:     Attends Club or Organization Meetings:     Marital Status:    Intimate Partner Violence:     Fear of Current or Ex-Partner:     Emotionally Abused:     Physically Abused:     Sexually Abused:         Past Surgical History:   Procedure Laterality Date    ABDOMINAL EXPLORATION SURGERY      APPENDECTOMY      CHOLECYSTECTOMY      CORONARY ANGIOPLASTY WITH STENT PLACEMENT  2014    HYSTERECTOMY      NECK SURGERY      PARTIAL HYSTERECTOMY      TONSILLECTOMY          Family History   Problem Relation Age of Onset    Early Death Mother     Heart Disease Mother     Colon Cancer Father 80        Allergies   Allergen Reactions    Iodine Anaphylaxis and Hives    Evista [Raloxifene Hcl]         ROS  No acute distress  Cardiac: Denies any chest pain or palpitation  Seen by Dr. Teresa Melendez in December 2020  Has another appointment later this year  RCA stent by Dr. Teresa Melendez  Respiratory: Denies any cough or shortness of breath  Chest x-ray July 2021 -  GI: No abdominal pain. Denies any nausea vomiting or diarrhea  : Denies any dysuria frequency or hematuria  Neuro: No headache or dizziness  Endocrine: No diabetes  Skin: normal  No recent weight gain or weight loss  Denies any change in vision  Has chronic back pain she takes Norco as needed for the pain  She has neuropathy taking gabapentin and sometimes she requires Norco  Objective:    /70   Pulse 98   Temp 97.4 °F (36.3 °C)   Wt 195 lb (88.5 kg)   SpO2 94%   BMI 28.80 kg/m²     Constitutional: Alert awake and oriented  Eyes: Pupils equal bilaterally. Extraocular muscles intact  Neck: no JVD adenopathy no bruit  Heart:  RRR, no murmurs, gallops, or rubs.   Lungs:    no wheeze, rales or rhonchi  Abd: bowel sounds present,   Left lower quadrant tenderness  No guarding no rigidity  Bowel sounds present  Extrem:  No clubbing, cyanosis, or edema  Neuro: AAOx3,No Focal deficit  Psychological: no depression or anxiety       Current Outpatient Medications   Medication Sig Dispense Refill    ciprofloxacin (CIPRO) 500 MG tablet Take 1 tablet by mouth 2 times daily for 7 days 14 tablet 0    metroNIDAZOLE (FLAGYL) 500 MG tablet Take 1 tablet by mouth 3 times daily for 10 days 30 tablet 0    celecoxib (CELEBREX) 200 MG capsule Take 1 capsule by mouth daily 90 capsule 0    HYDROcodone-acetaminophen (NORCO) 7.5-325 MG per tablet Take 1 tablet by mouth every 8 hours as needed for Pain for up to 30 days. Intended supply: 30 days 90 tablet 0    metoprolol succinate (TOPROL XL) 50 MG extended release tablet Take 1 tablet by mouth 2 times daily 180 tablet 1    gabapentin (NEURONTIN) 300 MG capsule Take 1 capsule by mouth 3 times daily for 90 days. 270 capsule 1    DULoxetine (CYMBALTA) 60 MG extended release capsule Take 1 capsule by mouth daily 90 capsule 1    cyclobenzaprine (FLEXERIL) 10 MG tablet Take 1 tablet by mouth daily 90 tablet 0    clopidogrel (PLAVIX) 75 MG tablet Take 1 tablet by mouth daily 90 tablet 1    atorvastatin (LIPITOR) 10 MG tablet Take 1 tablet by mouth daily 90 tablet 1    aspirin 81 MG tablet Take 81 mg by mouth daily       No current facility-administered medications for this visit.         Last 3 BMP  Lab Results   Component Value Date/Time     07/12/2021 08:59 AM     02/15/2016 10:12 AM     02/17/2014 09:15 AM    K 4.1 07/12/2021 08:59 AM    K 4.2 02/15/2016 10:12 AM    K 4.3 02/17/2014 09:15 AM     07/12/2021 08:59 AM     02/15/2016 10:12 AM     02/17/2014 09:15 AM    CO2 24 07/12/2021 08:59 AM    CO2 26 02/15/2016 10:12 AM    CO2 30 (H) 02/17/2014 09:15 AM    BUN 11 07/12/2021 08:59 AM    BUN 8 02/15/2016 10:12 AM    BUN 14 02/17/2014 09:15 AM    CREATININE 0.6 07/12/2021 08:59 AM CREATININE 0.7 02/15/2016 10:12 AM    CREATININE 0.7 02/17/2014 09:15 AM    GLUCOSE 113 (H) 07/12/2021 08:59 AM    GLUCOSE 111 (H) 02/15/2016 10:12 AM    GLUCOSE 101 02/17/2014 09:15 AM    GLUCOSE 97 02/13/2012 10:20 AM    CALCIUM 9.1 07/12/2021 08:59 AM    CALCIUM 8.9 02/15/2016 10:12 AM    CALCIUM 9.4 02/17/2014 09:15 AM       Last 3 CMP:    Lab Results   Component Value Date/Time     07/12/2021 08:59 AM     02/15/2016 10:12 AM     02/17/2014 09:15 AM    K 4.1 07/12/2021 08:59 AM    K 4.2 02/15/2016 10:12 AM    K 4.3 02/17/2014 09:15 AM     07/12/2021 08:59 AM     02/15/2016 10:12 AM     02/17/2014 09:15 AM    CO2 24 07/12/2021 08:59 AM    CO2 26 02/15/2016 10:12 AM    CO2 30 (H) 02/17/2014 09:15 AM    BUN 11 07/12/2021 08:59 AM    BUN 8 02/15/2016 10:12 AM    BUN 14 02/17/2014 09:15 AM    CREATININE 0.6 07/12/2021 08:59 AM    CREATININE 0.7 02/15/2016 10:12 AM    CREATININE 0.7 02/17/2014 09:15 AM    GLUCOSE 113 (H) 07/12/2021 08:59 AM    GLUCOSE 111 (H) 02/15/2016 10:12 AM    GLUCOSE 101 02/17/2014 09:15 AM    GLUCOSE 97 02/13/2012 10:20 AM    CALCIUM 9.1 07/12/2021 08:59 AM    CALCIUM 8.9 02/15/2016 10:12 AM    CALCIUM 9.4 02/17/2014 09:15 AM    PROT 6.8 07/12/2021 08:59 AM    PROT 6.6 02/15/2016 10:12 AM    PROT 7.1 02/17/2014 09:15 AM    LABALBU 3.8 07/12/2021 08:59 AM    LABALBU 4.2 02/15/2016 10:12 AM    LABALBU 4.2 02/17/2014 09:15 AM    LABALBU 4.5 02/13/2012 10:20 AM    BILITOT <0.2 07/12/2021 08:59 AM    BILITOT 0.3 02/15/2016 10:12 AM    BILITOT 0.2 02/17/2014 09:15 AM    ALKPHOS 109 (H) 07/12/2021 08:59 AM    ALKPHOS 111 (H) 02/15/2016 10:12 AM    ALKPHOS 100 02/17/2014 09:15 AM    AST 15 07/12/2021 08:59 AM    AST 25 02/15/2016 10:12 AM    AST 18 02/17/2014 09:15 AM    ALT 14 07/12/2021 08:59 AM    ALT 23 02/15/2016 10:12 AM    ALT 17 02/17/2014 09:15 AM        CBC:   Lab Results   Component Value Date/Time    WBC 8.4 07/12/2021 08:59 AM    RBC 5.02 07/12/2021 08:59 AM    HGB 14.3 07/12/2021 08:59 AM    HCT 45.8 07/12/2021 08:59 AM    MCV 91.2 07/12/2021 08:59 AM    MCH 28.5 07/12/2021 08:59 AM    MCHC 31.2 (L) 07/12/2021 08:59 AM    RDW 12.8 07/12/2021 08:59 AM     07/12/2021 08:59 AM    MPV 10.7 07/12/2021 08:59 AM       A1C:  Lab Results   Component Value Date/Time    LABA1C 5.8 (H) 07/12/2021 08:59 AM       Lipid panel:  Lab Results   Component Value Date    CHOL 207 07/12/2021    CHOL 173 11/11/2019    CHOL 370 02/15/2016    TRIG 335 07/12/2021    TRIG 237 11/11/2019    TRIG 566 02/15/2016    HDL 30 07/12/2021    HDL 36 11/11/2019    HDL 37 02/15/2016        Lab Results   Component Value Date/Time    PROT 6.8 07/12/2021 08:59 AM    PROT 6.6 02/15/2016 10:12 AM    PROT 7.1 02/17/2014 09:15 AM       No results found for: MG      Assessment. Jasmin Wheeler was seen today for abdominal pain and diarrhea. Diagnoses and all orders for this visit:    Diverticulitis  -     CBC WITH AUTO DIFFERENTIAL; Future  -     COMPREHENSIVE METABOLIC PANEL; Future  -     CT ABDOMEN PELVIS WO CONTRAST Additional Contrast? Oral; Future    Hyperthyroidism  -     TSH; Future  -     T4, FREE; Future  -     T3, FREE; Future    Weight loss  -     CBC WITH AUTO DIFFERENTIAL; Future  -     COMPREHENSIVE METABOLIC PANEL; Future  -     CT ABDOMEN PELVIS WO CONTRAST Additional Contrast? Oral; Future    Neuropathy  -     HYDROcodone-acetaminophen (NORCO) 7.5-325 MG per tablet; Take 1 tablet by mouth every 8 hours as needed for Pain for up to 30 days. Intended supply: 30 days    Coronary artery disease involving native coronary artery of native heart without angina pectoris    Hyperlipidemia, unspecified hyperlipidemia type    Chronic bilateral low back pain without sciatica  -     HYDROcodone-acetaminophen (NORCO) 7.5-325 MG per tablet; Take 1 tablet by mouth every 8 hours as needed for Pain for up to 30 days.  Intended supply: 30 days    Other orders  -     ciprofloxacin (CIPRO) 500 MG tablet; Take 1 tablet by mouth 2 times daily for 7 days  -     metroNIDAZOLE (FLAGYL) 500 MG tablet; Take 1 tablet by mouth 3 times daily for 10 days  -     celecoxib (CELEBREX) 200 MG capsule; Take 1 capsule by mouth daily       Patient Active Problem List   Diagnosis    Neuropathy    Hyperlipidemia    Coronary artery disease involving native coronary artery of native heart without angina pectoris    Generalized osteoarthritis       Plan: Diverticulitis  Cipro 500 twice daily,  Flagyl 500 3 times daily,  Diet modification,  CT abdomen pelvis with oral contrast  Patient allergic to iodine    Neuropathy continue gabapentin 300 3 times daily Norco as needed    Prescription for Norco 7.5/325 twice daily as needed 60 tablets oars report reviewed    CAD stable follow with cardiologist    Generalized osteoarthritis using Celebrex which is helping prescription given for Celebrex  Follow back in 4 weeks        Return in about 4 weeks (around 9/22/2021).        Naif Davenport MD  6:21 PM  8/25/2021     [place]

## 2021-08-26 ENCOUNTER — TELEPHONE (OUTPATIENT)
Dept: FAMILY MEDICINE CLINIC | Age: 66
End: 2021-08-26

## 2021-08-26 DIAGNOSIS — E05.90 HYPERTHYROIDISM: Primary | ICD-10-CM

## 2021-09-01 DIAGNOSIS — E04.1 THYROID NODULE: ICD-10-CM

## 2021-09-01 DIAGNOSIS — E05.90 HYPERTHYROIDISM: Primary | ICD-10-CM

## 2021-09-27 ENCOUNTER — HOSPITAL ENCOUNTER (OUTPATIENT)
Dept: NUCLEAR MEDICINE | Age: 66
Discharge: HOME | End: 2021-09-27
Payer: MEDICARE

## 2021-09-27 DIAGNOSIS — E05.90 HYPERTHYROIDISM: ICD-10-CM

## 2021-09-27 DIAGNOSIS — E04.1 THYROID NODULE: ICD-10-CM

## 2021-09-27 PROCEDURE — 3430000000 HC RX DIAGNOSTIC RADIOPHARMACEUTICAL: Performed by: RADIOLOGY

## 2021-09-27 PROCEDURE — A9516 IODINE I-123 SOD IODIDE MIC: HCPCS | Performed by: RADIOLOGY

## 2021-09-27 PROCEDURE — 78014 THYROID IMAGING W/BLOOD FLOW: CPT

## 2021-09-27 RX ORDER — SODIUM IODIDE I 123 100 UCI/1
200 CAPSULE, GELATIN COATED ORAL ONCE
Status: COMPLETED | OUTPATIENT
Start: 2021-09-27 | End: 2021-09-27

## 2021-09-27 RX ADMIN — SODIUM IODIDE I 123 200 MICRO CURIE: 100 CAPSULE, GELATIN COATED ORAL at 07:33

## 2021-09-28 ENCOUNTER — TELEPHONE (OUTPATIENT)
Dept: FAMILY MEDICINE CLINIC | Age: 66
End: 2021-09-28

## 2021-09-28 ENCOUNTER — OFFICE VISIT (OUTPATIENT)
Dept: FAMILY MEDICINE CLINIC | Age: 66
End: 2021-09-28
Payer: MEDICARE

## 2021-09-28 ENCOUNTER — HOSPITAL ENCOUNTER (OUTPATIENT)
Dept: NUCLEAR MEDICINE | Age: 66
Discharge: HOME | End: 2021-09-28
Payer: MEDICARE

## 2021-09-28 VITALS
WEIGHT: 193 LBS | BODY MASS INDEX: 28.5 KG/M2 | TEMPERATURE: 97 F | DIASTOLIC BLOOD PRESSURE: 70 MMHG | HEART RATE: 96 BPM | SYSTOLIC BLOOD PRESSURE: 120 MMHG | OXYGEN SATURATION: 98 %

## 2021-09-28 DIAGNOSIS — R19.7 DIARRHEA, UNSPECIFIED TYPE: ICD-10-CM

## 2021-09-28 DIAGNOSIS — M17.12 PRIMARY OSTEOARTHRITIS OF LEFT KNEE: ICD-10-CM

## 2021-09-28 DIAGNOSIS — M54.50 CHRONIC BILATERAL LOW BACK PAIN WITHOUT SCIATICA: ICD-10-CM

## 2021-09-28 DIAGNOSIS — E05.90 HYPERTHYROIDISM: Primary | ICD-10-CM

## 2021-09-28 DIAGNOSIS — G89.29 CHRONIC BILATERAL LOW BACK PAIN WITHOUT SCIATICA: ICD-10-CM

## 2021-09-28 DIAGNOSIS — R63.4 WEIGHT LOSS: ICD-10-CM

## 2021-09-28 DIAGNOSIS — I25.10 CORONARY ARTERY DISEASE INVOLVING NATIVE CORONARY ARTERY OF NATIVE HEART WITHOUT ANGINA PECTORIS: ICD-10-CM

## 2021-09-28 PROCEDURE — 3017F COLORECTAL CA SCREEN DOC REV: CPT | Performed by: INTERNAL MEDICINE

## 2021-09-28 PROCEDURE — 1123F ACP DISCUSS/DSCN MKR DOCD: CPT | Performed by: INTERNAL MEDICINE

## 2021-09-28 PROCEDURE — G8417 CALC BMI ABV UP PARAM F/U: HCPCS | Performed by: INTERNAL MEDICINE

## 2021-09-28 PROCEDURE — 4040F PNEUMOC VAC/ADMIN/RCVD: CPT | Performed by: INTERNAL MEDICINE

## 2021-09-28 PROCEDURE — G8427 DOCREV CUR MEDS BY ELIG CLIN: HCPCS | Performed by: INTERNAL MEDICINE

## 2021-09-28 PROCEDURE — 1090F PRES/ABSN URINE INCON ASSESS: CPT | Performed by: INTERNAL MEDICINE

## 2021-09-28 PROCEDURE — 99214 OFFICE O/P EST MOD 30 MIN: CPT | Performed by: INTERNAL MEDICINE

## 2021-09-28 PROCEDURE — 1036F TOBACCO NON-USER: CPT | Performed by: INTERNAL MEDICINE

## 2021-09-28 PROCEDURE — G8399 PT W/DXA RESULTS DOCUMENT: HCPCS | Performed by: INTERNAL MEDICINE

## 2021-09-28 RX ORDER — HYDROCODONE BITARTRATE AND ACETAMINOPHEN 7.5; 325 MG/1; MG/1
1 TABLET ORAL EVERY 8 HOURS PRN
Qty: 90 TABLET | Refills: 0 | Status: SHIPPED | OUTPATIENT
Start: 2021-09-28 | End: 2021-10-28

## 2021-09-28 NOTE — TELEPHONE ENCOUNTER
Patient called to inform Dr. Allyssa Montoya is unable to see patient until March 2022. Patient is asking for a new referral and stated Dr. Virginia Guillen mentioned a Dr. Julio Pham.

## 2021-09-28 NOTE — PROGRESS NOTES
Subjective:     Chief Complaint   Patient presents with    1 Month Follow-Up   Complains of pain in the left knee for the past 2 weeks he denies any fall no injury,  It hurts even at rest, the pain is 4 out of 10,    Has chronic back pain she takes Norco as needed    She was found to have hyperthyroidism occasional palpitation, she was seen by Dr. Stephanie Wilkins, she is on the beta-blocker,  She was referred to endocrinology she was given appointment in March 2022, patient is symptomatic with hyperthyroidism    Her abdominal pain has resolved  CT abdomen pelvis recently no acute pathology    Past Medical History:   Diagnosis Date    Anxiety     CAD (coronary artery disease)     Chronic back pain     [facility]     Depression     GERD (gastroesophageal reflux disease)     Hyperlipidemia     Hypertension     [facility]     Neuropathy         Social History     Socioeconomic History    Marital status:      Spouse name: Not on file    Number of children: Not on file    Years of education: Not on file    Highest education level: Not on file   Occupational History    Not on file   Tobacco Use    Smoking status: Former Smoker     Packs/day: 1.00     Years: 27.00     Pack years: 27.00     Quit date: 2011     Years since quitting: 10.7    Smokeless tobacco: Never Used   Substance and Sexual Activity    Alcohol use: No    Drug use: No    Sexual activity: Not on file   Other Topics Concern    Not on file   Social History Narrative    Not on file     Social Determinants of Health     Financial Resource Strain:     Difficulty of Paying Living Expenses:    Food Insecurity: No Food Insecurity    Worried About Running Out of Food in the Last Year: Never true    Sadie of Food in the Last Year: Never true   Transportation Needs: No Transportation Needs    Lack of Transportation (Medical): No    Lack of Transportation (Non-Medical):  No   Physical Activity:     Days of Exercise per Week:     Minutes of Exercise per Session:    Stress:     Feeling of Stress :    Social Connections:     Frequency of Communication with Friends and Family:     Frequency of Social Gatherings with Friends and Family:     Attends Jew Services:     Active Member of Clubs or Organizations:     Attends Club or Organization Meetings:     Marital Status:    Intimate Partner Violence:     Fear of Current or Ex-Partner:     Emotionally Abused:     Physically Abused:     Sexually Abused:         Past Surgical History:   Procedure Laterality Date    ABDOMINAL EXPLORATION SURGERY      APPENDECTOMY     [address] WITH STENT PLACEMENT  2014    HYSTERECTOMY      NECK SURGERY      PARTIAL HYSTERECTOMY      TONSILLECTOMY          Family History   Problem Relation Age of Onset    Early Death Mother     Heart Disease Mother     Colon Cancer Father 80        Allergies   Allergen Reactions    Iodine Anaphylaxis and Hives    Evista [Raloxifene Hcl]         ROS  No acute distress  Cardiac: Denies any chest pain or palpitation  Saw Dr. Juan Luis Basurto last week checkup was negative  Respiratory: Denies any cough or shortness of breath  GI: No abdominal pain. Denies any nausea vomiting or diarrhea  Colonoscopy March 2021 in [facility] clinic with Dr. Lara Shrestha repeat in 5 years  CT abdomen pelvis August 2021 was okay  : Denies any dysuria frequency or hematuria  Neuro: No headache or dizziness  Endocrine: No diabetes  Skin: normal  No recent weight gain or weight loss  Denies any change in vision  hyperthyroidism  Objective:    /70   Pulse 96   Temp 97 °F (36.1 °C)   Wt 193 lb (87.5 kg)   SpO2 98%   BMI 28.50 kg/m²     Constitutional: Alert awake and oriented  Eyes: Pupils equal bilaterally. Extraocular muscles intact  Neck: no JVD adenopathy no bruit  Heart:  RRR, no murmurs, gallops, or rubs.   Lungs:    no wheeze, rales or rhonchi  Abd: bowel sounds present, nontender, nondistended, no masses  Extrem:  No clubbing, cyanosis, or edema  Neuro: AAOx3,No Focal deficit  Psychological: no depression or anxiety   Left knee exam range of motion are painful  No clinical signs of DVT    Current Outpatient Medications   Medication Sig Dispense Refill    HYDROcodone-acetaminophen (LORCET PLUS) 7.5-325 MG per tablet Take 1 tablet by mouth every 8 hours as needed for Pain for up to 30 days. Intended supply: 30 days 90 tablet 0    celecoxib (CELEBREX) 200 MG capsule Take 1 capsule by mouth daily 90 capsule 0    metoprolol succinate (TOPROL XL) 50 MG extended release tablet Take 1 tablet by mouth 2 times daily 180 tablet 1    DULoxetine (CYMBALTA) 60 MG extended release capsule Take 1 capsule by mouth daily 90 capsule 1    cyclobenzaprine (FLEXERIL) 10 MG tablet Take 1 tablet by mouth daily 90 tablet 0    clopidogrel (PLAVIX) 75 MG tablet Take 1 tablet by mouth daily 90 tablet 1    atorvastatin (LIPITOR) 10 MG tablet Take 1 tablet by mouth daily 90 tablet 1    aspirin 81 MG tablet Take 81 mg by mouth daily      gabapentin (NEURONTIN) 300 MG capsule Take 1 capsule by mouth 3 times daily for 90 days. 270 capsule 1     No current facility-administered medications for this visit.         Last 3 BMP  Lab Results   Component Value Date/Time     08/25/2021 04:30 PM     07/12/2021 08:59 AM     02/15/2016 10:12 AM    K 4.7 08/25/2021 04:30 PM    K 4.1 07/12/2021 08:59 AM    K 4.2 02/15/2016 10:12 AM     08/25/2021 04:30 PM     07/12/2021 08:59 AM     02/15/2016 10:12 AM    CO2 28 08/25/2021 04:30 PM    CO2 24 07/12/2021 08:59 AM    CO2 26 02/15/2016 10:12 AM    BUN 9 08/25/2021 04:30 PM    BUN 11 07/12/2021 08:59 AM    BUN 8 02/15/2016 10:12 AM    CREATININE 0.6 08/25/2021 04:30 PM    CREATININE 0.6 07/12/2021 08:59 AM    CREATININE 0.7 02/15/2016 10:12 AM    GLUCOSE 110 (H) 08/25/2021 04:30 PM    GLUCOSE 113 (H) 07/12/2021 08:59 AM    GLUCOSE 111 (H) 02/15/2016 10:12 AM    GLUCOSE 97 02/13/2012 10:20 AM    CALCIUM 9.5 08/25/2021 04:30 PM    CALCIUM 9.1 07/12/2021 08:59 AM    CALCIUM 8.9 02/15/2016 10:12 AM       Last 3 CMP:    Lab Results   Component Value Date/Time     08/25/2021 04:30 PM     07/12/2021 08:59 AM     02/15/2016 10:12 AM    K 4.7 08/25/2021 04:30 PM    K 4.1 07/12/2021 08:59 AM    K 4.2 02/15/2016 10:12 AM     08/25/2021 04:30 PM     07/12/2021 08:59 AM     02/15/2016 10:12 AM    CO2 28 08/25/2021 04:30 PM    CO2 24 07/12/2021 08:59 AM    CO2 26 02/15/2016 10:12 AM    BUN 9 08/25/2021 04:30 PM    BUN 11 07/12/2021 08:59 AM    BUN 8 02/15/2016 10:12 AM    CREATININE 0.6 08/25/2021 04:30 PM    CREATININE 0.6 07/12/2021 08:59 AM    CREATININE 0.7 02/15/2016 10:12 AM    GLUCOSE 110 (H) 08/25/2021 04:30 PM    GLUCOSE 113 (H) 07/12/2021 08:59 AM    GLUCOSE 111 (H) 02/15/2016 10:12 AM    GLUCOSE 97 02/13/2012 10:20 AM    CALCIUM 9.5 08/25/2021 04:30 PM    CALCIUM 9.1 07/12/2021 08:59 AM    CALCIUM 8.9 02/15/2016 10:12 AM    PROT 7.0 08/25/2021 04:30 PM    PROT 6.8 07/12/2021 08:59 AM    PROT 6.6 02/15/2016 10:12 AM    LABALBU 4.1 08/25/2021 04:30 PM    LABALBU 3.8 07/12/2021 08:59 AM    LABALBU 4.2 02/15/2016 10:12 AM    LABALBU 4.5 02/13/2012 10:20 AM    BILITOT 0.3 08/25/2021 04:30 PM    BILITOT <0.2 07/12/2021 08:59 AM    BILITOT 0.3 02/15/2016 10:12 AM    ALKPHOS 124 (H) 08/25/2021 04:30 PM    ALKPHOS 109 (H) 07/12/2021 08:59 AM    ALKPHOS 111 (H) 02/15/2016 10:12 AM    AST 31 08/25/2021 04:30 PM    AST 15 07/12/2021 08:59 AM    AST 25 02/15/2016 10:12 AM    ALT 28 08/25/2021 04:30 PM    ALT 14 07/12/2021 08:59 AM    ALT 23 02/15/2016 10:12 AM        CBC:   Lab Results   Component Value Date/Time    WBC 5.9 08/25/2021 04:30 PM    RBC 5.30 08/25/2021 04:30 PM    HGB 14.8 08/25/2021 04:30 PM    HCT 46.2 08/25/2021 04:30 PM    MCV 87.2 08/25/2021 04:30 PM    MCH 27.9 08/25/2021 04:30 PM    MCHC has weight loss,    Left knee pain possible arthritis refer to Dr. Shemar Paul orthopedic      Weight loss and diarrhea GI work-up negative possibly from hyper thyroidism    CAD stable    Patient to call me within 1 week after she talked to the endocrinologist    Return in about 4 weeks (around 10/26/2021).        Jayde Moore MD  10:33 AM  9/28/2021     [place]

## 2021-09-28 NOTE — TELEPHONE ENCOUNTER
I called patient twice left a message for her, referral has been sent to Dr. Leatha Epley, patient to call back in 1 week if she does not hear from them

## 2021-09-29 NOTE — TELEPHONE ENCOUNTER
I spoke to patient this morning referral sent for Dr. Jamila Wilks, patient to call me next week if she does not hear from them

## 2021-10-11 ENCOUNTER — OFFICE VISIT (OUTPATIENT)
Dept: ORTHOPEDIC SURGERY | Age: 66
End: 2021-10-11
Payer: MEDICARE

## 2021-10-11 VITALS — TEMPERATURE: 98 F | BODY MASS INDEX: 26.92 KG/M2 | WEIGHT: 188 LBS | HEIGHT: 70 IN

## 2021-10-11 DIAGNOSIS — M17.12 PRIMARY OSTEOARTHRITIS OF LEFT KNEE: ICD-10-CM

## 2021-10-11 DIAGNOSIS — M25.562 ACUTE PAIN OF LEFT KNEE: Primary | ICD-10-CM

## 2021-10-11 DIAGNOSIS — M17.12 PRIMARY OSTEOARTHRITIS OF LEFT KNEE: Primary | ICD-10-CM

## 2021-10-11 PROCEDURE — G8427 DOCREV CUR MEDS BY ELIG CLIN: HCPCS | Performed by: ORTHOPAEDIC SURGERY

## 2021-10-11 PROCEDURE — 1123F ACP DISCUSS/DSCN MKR DOCD: CPT | Performed by: ORTHOPAEDIC SURGERY

## 2021-10-11 PROCEDURE — G8399 PT W/DXA RESULTS DOCUMENT: HCPCS | Performed by: ORTHOPAEDIC SURGERY

## 2021-10-11 PROCEDURE — 1036F TOBACCO NON-USER: CPT | Performed by: ORTHOPAEDIC SURGERY

## 2021-10-11 PROCEDURE — 4040F PNEUMOC VAC/ADMIN/RCVD: CPT | Performed by: ORTHOPAEDIC SURGERY

## 2021-10-11 PROCEDURE — 99203 OFFICE O/P NEW LOW 30 MIN: CPT | Performed by: ORTHOPAEDIC SURGERY

## 2021-10-11 PROCEDURE — G8484 FLU IMMUNIZE NO ADMIN: HCPCS | Performed by: ORTHOPAEDIC SURGERY

## 2021-10-11 PROCEDURE — 3017F COLORECTAL CA SCREEN DOC REV: CPT | Performed by: ORTHOPAEDIC SURGERY

## 2021-10-11 PROCEDURE — 1090F PRES/ABSN URINE INCON ASSESS: CPT | Performed by: ORTHOPAEDIC SURGERY

## 2021-10-11 PROCEDURE — G8417 CALC BMI ABV UP PARAM F/U: HCPCS | Performed by: ORTHOPAEDIC SURGERY

## 2021-10-11 NOTE — PROGRESS NOTES
Christie Dempsey is a 74 y.o. female, who presents   Chief Complaint   Patient presents with    Knee Pain     Left Knee x 1 month, no known injury, previous knee arthroscopy years ago. HPI[de-identified] Left knee pain is been present for some time. This is mostly on the inner side. This is caused Poonam Machado to limp some. She has had no specific injury other than a minor twist perhaps. Treatment has involved oral anti-inflammatories and also topical agents in the form of Salonpas. There is only been minor improvement with his treatment. Allergies; medications; past medical, surgical, family, and social history; and problem list have been reviewed today and updated as indicated in this encounter - see below following Ortho specifics. Musculoskeletal: Skin condition gross neurovascular function is good in left lower extremity. Gait is little bit impaired with favoring of the left lower extremity. She walks without fully extending her left knee. Single-leg stance is little bit shaky on the left side but she is able to do it without assistance. Hip and ankle range of motion stability are good without pain. Left knee range of motion is little less than 5 degrees to 110 degrees of flexion. There is mild discomfort with range of motion. There is a slight amount of crepitus as well. There is medial gapping and a painful reaction with stress examination and Jessica testing suggests medial meniscus damage. Collateral cruciate ligaments are stable. There is little if any effusion this morning in her knee is not hot or red. Her calf is supple with no tenderness. Radiologic Studies: Imaging studies and 2 views with AP weightbearing films shows fairly good maintenance of joint spaces. There is squaring of the margins of the medial femoral and tibial condyles indicating some hypertrophic degenerative changes. ASSESSMENT:  Poonam Machado was seen today for knee pain.     Diagnoses and all orders for this visit:    Primary osteoarthritis of left knee     Treatment alternatives were reviewed including medical and physical therapies, injections, and surgical options, expected risks benefits and likely outcome of each were discussed in detail, questions asked and answered and understood. We discussed symptoms as well as physical findings and imaging results. There is a high level suspicion for medial meniscus damage. Appropriate evaluation would be MRI getting of the left knee to better define any bony abnormalities but more specifically to target the soft tissue pathology. PLAN: We will seek approval for MRI of the left knee and proceed accordingly. Patient Active Problem List   Diagnosis    Neuropathy    Hyperlipidemia    Coronary artery disease involving native coronary artery of native heart without angina pectoris    Generalized osteoarthritis    Weight loss    Diarrhea    Hyperthyroidism    Primary osteoarthritis of left knee    Chronic bilateral low back pain without sciatica       Past Medical History:   Diagnosis Date    Anxiety     CAD (coronary artery disease)     Chronic back pain     [facility]     Depression     GERD (gastroesophageal reflux disease)     Hyperlipidemia     Hypertension     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  Intended supply: 30 days 90 tablet 0    celecoxib (CELEBREX) 200 MG capsule Take 1 capsule by mouth daily 90 capsule 0    metoprolol succinate (TOPROL XL) 50 MG extended release tablet Take 1 tablet by mouth 2 times daily 180 tablet 1    DULoxetine (CYMBALTA) 60 MG extended release capsule Take 1 capsule by mouth daily 90 capsule 1    cyclobenzaprine (FLEXERIL) 10 MG tablet Take 1 tablet by mouth daily 90 tablet 0    clopidogrel (PLAVIX) 75 MG tablet Take 1 tablet by mouth daily 90 tablet 1    atorvastatin (LIPITOR) 10 MG tablet Take 1 tablet by mouth daily 90 tablet 1    aspirin 81 MG tablet Take 81 mg by mouth daily      gabapentin (NEURONTIN) 300 MG capsule Take 1 capsule by mouth 3 times daily for 90 days. 270 capsule 1     No current facility-administered medications for this visit. Allergies   Allergen Reactions    Iodine Anaphylaxis and Hives    Evista [Raloxifene Hcl]        Social History     Socioeconomic History    Marital status:      Spouse name: None    Number of children: None    Years of education: None    Highest education level: None   Occupational History    None   Tobacco Use    Smoking status: Former Smoker     Packs/day: 1.00     Years: 27.00     Pack years: 27.00     Quit date: 2011     Years since quitting: 10.7    Smokeless tobacco: Never Used   Substance and Sexual Activity    Alcohol use: No    Drug use: No    Sexual activity: None   Other Topics Concern    None   Social History Narrative    None     Social Determinants of Health     Financial Resource Strain:     Difficulty of Paying Living Expenses:    Food Insecurity: No Food Insecurity    Worried About Running Out of Food in the Last Year: Never true    Sadie of Food in the Last Year: Never true   Transportation Needs: No Transportation Needs    Lack of Transportation (Medical): No    Lack of Transportation (Non-Medical):  No   Physical Activity:     Days of Exercise per Week:     Minutes of Exercise per Session:    Stress:     Feeling of Stress :    Social Connections:     Frequency of Communication with Friends and Family:     Frequency of Social Gatherings with Friends and Family:     Attends Jain Services:     Active Member of Clubs or Organizations:     Attends Club or Organization Meetings:     Marital Status:    Intimate Partner Violence:     Fear of Current or Ex-Partner:     Emotionally Abused:     Physically Abused:     Sexually Abused:        Family History   Problem Relation Age of Onset    Early Death Mother     Heart Disease Mother     Colon Cancer Father 80         Review of Systems:   As follows except as previously noted in HPI:  Constitutional: Negative for chills, diaphoresis,  fever   Respiratory: Negative for cough, shortness of breath and wheezing. Cardiovascular: Negative for chest pain and palpitations. Neurological: Negative for dizziness, syncope,   GI / : abdominal pain or cramping  Musculoskeletal: see HPI       Objective:   Physical Exam   Constitutional: Oriented to person, place, and time. and appears well-developed and well-nourished. :   Head: Normocephalic and atraumatic. Neck: Neck supple. Eyes: EOM are normal.   Pulmonary/Chest: Effort normal.  No respiratory distress, no wheezes. Neurological: Alert and oriented to person  Skin: Skin is warm and dry. Zaid Lopez DO    10/11/21  8:35 AM    All reasonable efforts have been made to minimize the risk of errors that may occur in the use of voice recognition and other electronic means of charting.

## 2021-10-22 RX ORDER — CELECOXIB 200 MG/1
200 CAPSULE ORAL DAILY
Qty: 30 CAPSULE | Refills: 3 | Status: SHIPPED
Start: 2021-10-22 | End: 2021-12-02 | Stop reason: SDUPTHER

## 2021-10-27 ENCOUNTER — OFFICE VISIT (OUTPATIENT)
Dept: ORTHOPEDIC SURGERY | Age: 66
End: 2021-10-27
Payer: MEDICARE

## 2021-10-27 VITALS — HEIGHT: 70 IN | WEIGHT: 188 LBS | BODY MASS INDEX: 26.92 KG/M2 | TEMPERATURE: 98 F

## 2021-10-27 DIAGNOSIS — M23.322 MEDIAL MENISCUS, POSTERIOR HORN DERANGEMENT, LEFT: ICD-10-CM

## 2021-10-27 DIAGNOSIS — M17.12 PRIMARY OSTEOARTHRITIS OF LEFT KNEE: Primary | ICD-10-CM

## 2021-10-27 PROCEDURE — 3017F COLORECTAL CA SCREEN DOC REV: CPT | Performed by: ORTHOPAEDIC SURGERY

## 2021-10-27 PROCEDURE — 1036F TOBACCO NON-USER: CPT | Performed by: ORTHOPAEDIC SURGERY

## 2021-10-27 PROCEDURE — G8399 PT W/DXA RESULTS DOCUMENT: HCPCS | Performed by: ORTHOPAEDIC SURGERY

## 2021-10-27 PROCEDURE — G8427 DOCREV CUR MEDS BY ELIG CLIN: HCPCS | Performed by: ORTHOPAEDIC SURGERY

## 2021-10-27 PROCEDURE — 1123F ACP DISCUSS/DSCN MKR DOCD: CPT | Performed by: ORTHOPAEDIC SURGERY

## 2021-10-27 PROCEDURE — G8484 FLU IMMUNIZE NO ADMIN: HCPCS | Performed by: ORTHOPAEDIC SURGERY

## 2021-10-27 PROCEDURE — G8417 CALC BMI ABV UP PARAM F/U: HCPCS | Performed by: ORTHOPAEDIC SURGERY

## 2021-10-27 PROCEDURE — 1090F PRES/ABSN URINE INCON ASSESS: CPT | Performed by: ORTHOPAEDIC SURGERY

## 2021-10-27 PROCEDURE — 99213 OFFICE O/P EST LOW 20 MIN: CPT | Performed by: ORTHOPAEDIC SURGERY

## 2021-10-27 PROCEDURE — 4040F PNEUMOC VAC/ADMIN/RCVD: CPT | Performed by: ORTHOPAEDIC SURGERY

## 2021-10-27 NOTE — PROGRESS NOTES
Chief Complaint:   Chief Complaint   Patient presents with    Knee Pain     f/u left knee MRI. Marky Acuña follows up to review her MRI which was done at [facility] 10/25/2021. Allergies; medications; past medical, surgical, family, and social history; and problem list have been reviewed today and updated as indicated in this encounter seen below. Exam: Range of motion of the left knee is somewhat limited. There is crepitus and discomfort with stress examination with some medial opening. Patellofemoral alignment is good. Her calf is supple with no tenderness. There is minimal effusion this morning and the knee is not hot or red. Radiographs: Imaging was reviewed and shows tearing of the posterior horn medial meniscus of the left knee. There is also some chondrolysis in the medial compartment more than lateral or patellofemoral.  She does have some remaining joint cartilage. There is slight effusion in the knee. Cruciates and collateral ligaments are intact. ASSESSMENT:    Lazara Ruiz was seen today for knee pain. Diagnoses and all orders for this visit:    Primary osteoarthritis of left knee    Medial meniscus, posterior horn derangement, left        PLAN: We discussed the findings on the imaging. We discussed treatment options that are available as well. These would be arthroscopic examination treatment with cleanout of the knee to improve comfort and function without doing a major open procedure. The alternative would be knee replacement arthroplasty and it seems a little premature at this point. We discussed the surgery, risk, prognosis, limitations and potential rehab issues. Lazara Ruiz would like to have the knee needed arthroscopically and we will schedule that as an outpatient procedure. No follow-ups on file.        Current Outpatient Medications   Medication Sig Dispense Refill    celecoxib (CELEBREX) 200 MG capsule TAKE 1 CAPSULE BY MOUTH DAILY 30 capsule 3    HYDROcodone-acetaminophen (LORCET PLUS) 7.5-325 MG per tablet Take 1 tablet by mouth every 8 hours as needed for Pain for up to 30 days. Intended supply: 30 days 90 tablet 0    metoprolol succinate (TOPROL XL) 50 MG extended release tablet Take 1 tablet by mouth 2 times daily 180 tablet 1    DULoxetine (CYMBALTA) 60 MG extended release capsule Take 1 capsule by mouth daily 90 capsule 1    cyclobenzaprine (FLEXERIL) 10 MG tablet Take 1 tablet by mouth daily 90 tablet 0    clopidogrel (PLAVIX) 75 MG tablet Take 1 tablet by mouth daily 90 tablet 1    atorvastatin (LIPITOR) 10 MG tablet Take 1 tablet by mouth daily 90 tablet 1    aspirin 81 MG tablet Take 81 mg by mouth daily      gabapentin (NEURONTIN) 300 MG capsule Take 1 capsule by mouth 3 times daily for 90 days. 270 capsule 1     No current facility-administered medications for this visit.        Patient Active Problem List   Diagnosis    Neuropathy    Hyperlipidemia    Coronary artery disease involving native coronary artery of native heart without angina pectoris    Generalized osteoarthritis    Weight loss    Diarrhea    Hyperthyroidism    Primary osteoarthritis of left knee    Chronic bilateral low back pain without sciatica       Past Medical History:   Diagnosis Date    Anxiety     CAD (coronary artery disease)     Chronic back pain     [facility]     Depression     GERD (gastroesophageal reflux disease)     Hyperlipidemia     Hypertension     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 and appears well-developed and well-nourished. :   Head: Normocephalic and atraumatic. Eyes: EOM are normal.   Neck: Neck supple. Cardiovascular: Normal rate and regular rhythm. Pulmonary/Chest: Effort normal. No stridor. No respiratory distress, no wheezes. Abdominal:  No abnormal distension. Neurological: Alert and oriented to person, place, and time. Skin: Skin is warm and dry. Psychiatric: Normal mood and affect.  Behavior is normal. Thought content normal.    MATTHIEU Hayden DO    10/27/21  9:50 AM

## 2021-11-01 ENCOUNTER — OFFICE VISIT (OUTPATIENT)
Dept: FAMILY MEDICINE CLINIC | Age: 66
End: 2021-11-01
Payer: MEDICARE

## 2021-11-01 VITALS
TEMPERATURE: 96.5 F | HEART RATE: 105 BPM | WEIGHT: 185 LBS | OXYGEN SATURATION: 95 % | BODY MASS INDEX: 26.54 KG/M2 | SYSTOLIC BLOOD PRESSURE: 136 MMHG | DIASTOLIC BLOOD PRESSURE: 60 MMHG

## 2021-11-01 DIAGNOSIS — Z01.818 PRE-OP TESTING: Primary | ICD-10-CM

## 2021-11-01 DIAGNOSIS — R63.4 WEIGHT LOSS: ICD-10-CM

## 2021-11-01 DIAGNOSIS — M54.50 CHRONIC BILATERAL LOW BACK PAIN WITHOUT SCIATICA: ICD-10-CM

## 2021-11-01 DIAGNOSIS — E05.90 HYPERTHYROIDISM: ICD-10-CM

## 2021-11-01 DIAGNOSIS — G89.29 CHRONIC BILATERAL LOW BACK PAIN WITHOUT SCIATICA: ICD-10-CM

## 2021-11-01 DIAGNOSIS — I25.10 CORONARY ARTERY DISEASE INVOLVING NATIVE CORONARY ARTERY OF NATIVE HEART WITHOUT ANGINA PECTORIS: ICD-10-CM

## 2021-11-01 PROCEDURE — 99214 OFFICE O/P EST MOD 30 MIN: CPT | Performed by: INTERNAL MEDICINE

## 2021-11-01 PROCEDURE — 1036F TOBACCO NON-USER: CPT | Performed by: INTERNAL MEDICINE

## 2021-11-01 PROCEDURE — 3017F COLORECTAL CA SCREEN DOC REV: CPT | Performed by: INTERNAL MEDICINE

## 2021-11-01 PROCEDURE — 1090F PRES/ABSN URINE INCON ASSESS: CPT | Performed by: INTERNAL MEDICINE

## 2021-11-01 PROCEDURE — 93000 ELECTROCARDIOGRAM COMPLETE: CPT | Performed by: INTERNAL MEDICINE

## 2021-11-01 PROCEDURE — G8417 CALC BMI ABV UP PARAM F/U: HCPCS | Performed by: INTERNAL MEDICINE

## 2021-11-01 PROCEDURE — G8427 DOCREV CUR MEDS BY ELIG CLIN: HCPCS | Performed by: INTERNAL MEDICINE

## 2021-11-01 PROCEDURE — G8399 PT W/DXA RESULTS DOCUMENT: HCPCS | Performed by: INTERNAL MEDICINE

## 2021-11-01 PROCEDURE — 4040F PNEUMOC VAC/ADMIN/RCVD: CPT | Performed by: INTERNAL MEDICINE

## 2021-11-01 PROCEDURE — 1123F ACP DISCUSS/DSCN MKR DOCD: CPT | Performed by: INTERNAL MEDICINE

## 2021-11-01 PROCEDURE — G8484 FLU IMMUNIZE NO ADMIN: HCPCS | Performed by: INTERNAL MEDICINE

## 2021-11-01 RX ORDER — METHIMAZOLE 10 MG/1
10 TABLET ORAL DAILY
Qty: 30 TABLET | Refills: 3 | Status: SHIPPED | OUTPATIENT
Start: 2021-11-01 | End: 2021-12-01

## 2021-11-01 NOTE — PROGRESS NOTES
Subjective:     Chief Complaint   Patient presents with    Pre-op Exam   Patient here for preop testing she has severe arthritis the left knee which hurts, she was seen by orthopedic who recommended arthroscopic surgery, patient is here for preop clearance,    She has history of CAD stent in RCA 2014 she did see Dr. Alejandra Moser 1 month ago stress test was negative    She is still losing weight,    She was found to have hyperthyroidism,  She is on the beta-blocker,    She was referred to endocrinologist they gave her appointment for March,      I tried another endocrinologist, because the appointment for January,    I offered her to go to [place] if we can get earlier appointment patient declined,    CAT scan abdomen pelvis was negative in August 2021 done for weight loss    Her chest x-ray was - July 2021        Past Medical History:   Diagnosis Date    Anxiety     CAD (coronary artery disease)     Chronic back pain     [facility] [address]     Depression     GERD (gastroesophageal reflux disease)     Hyperlipidemia     Hypertension     [facility] ([facility]     Neuropathy         Social History     Socioeconomic History    Marital status:       Spouse name: Not on file    Number of children: Not on file    Years of education: Not on file    Highest education level: Not on file   Occupational History    Not on file   Tobacco Use    Smoking status: Former Smoker     Packs/day: 1.00     Years: 27.00     Pack years: 27.00     Quit date: 2011     Years since quitting: 10.8    Smokeless tobacco: Never Used   Substance and Sexual Activity    Alcohol use: No    Drug use: No    Sexual activity: Not on file   Other Topics Concern    Not on file   Social History Narrative    Not on file     Social Determinants of Health     Financial Resource Strain:     Difficulty of Paying Living Expenses:    Food Insecurity: No Food Insecurity    Worried About Running Out of Food in the Last Year: Never true    [address] in the Last Year: Never true   Transportation Needs: No Transportation Needs    Lack of Transportation (Medical): No    Lack of Transportation (Non-Medical): No   Physical Activity:     Days of Exercise per Week:     Minutes of Exercise per Session:    Stress:     Feeling of Stress :    Social Connections:     Frequency of Communication with Friends and Family:     Frequency of Social Gatherings with Friends and Family:     Attends Christianity Services:     Active Member of Clubs or Organizations:     Attends Club or Organization Meetings:     Marital Status:    Intimate Partner Violence:     Fear of Current or Ex-Partner:     Emotionally Abused:     Physically Abused:     Sexually Abused:         Past Surgical History:   Procedure Laterality Date    ABDOMINAL EXPLORATION SURGERY      APPENDECTOMY      CHOLECYSTECTOMY      CORONARY ANGIOPLASTY WITH STENT PLACEMENT  2014    HYSTERECTOMY      NECK SURGERY      PARTIAL HYSTERECTOMY      TONSILLECTOMY          Family History   Problem Relation Age of Onset    Early Death Mother     Heart Disease Mother     Colon Cancer Father 80        Allergies   Allergen Reactions    Iodine Anaphylaxis and Hives    Evista [Raloxifene Hcl]         ROS  No acute distress  Cardiac: Denies any chest pain or palpitation  History of CAD, catheter in 2014 stent in RCA  Seen by Dr. Shlomo Danielle in September 2021 stress test was negative as per patient  Patient on the metoprolol 50 twice daily  Respiratory: Denies any cough or shortness of breath  GI: No abdominal pain.  Denies any nausea vomiting or diarrhea  Colonoscopy March 2021 in [facility] clinic  Dr. Tiny Don    : Denies any dysuria frequency or hematuria  Neuro: No headache or dizziness  Endocrine: No diabetes  Skin: normal  Hypothyroidism  Denies any change in vision    Objective:    /60   Pulse 105   Temp 96.5 °F (35.8 °C)   Wt 185 lb (83.9 kg) SpO2 95%   BMI 26.54 kg/m²     Constitutional: Alert awake and oriented  Eyes: Pupils equal bilaterally. Extraocular muscles intact  Neck: no JVD adenopathy no bruit  Heart:  RRR, no murmurs, gallops, or rubs. Lungs:    no wheeze, rales or rhonchi  Abd: bowel sounds present, nontender, nondistended, no masses  Extrem:  No clubbing, cyanosis, or edema  Neuro: AAOx3,No Focal deficit  Psychological: no depression or anxiety       Current Outpatient Medications   Medication Sig Dispense Refill    methIMAzole (TAPAZOLE) 10 MG tablet Take 1 tablet by mouth daily 30 tablet 3    celecoxib (CELEBREX) 200 MG capsule TAKE 1 CAPSULE BY MOUTH DAILY 30 capsule 3    metoprolol succinate (TOPROL XL) 50 MG extended release tablet Take 1 tablet by mouth 2 times daily 180 tablet 1    gabapentin (NEURONTIN) 300 MG capsule Take 1 capsule by mouth 3 times daily for 90 days. 270 capsule 1    DULoxetine (CYMBALTA) 60 MG extended release capsule Take 1 capsule by mouth daily 90 capsule 1    cyclobenzaprine (FLEXERIL) 10 MG tablet Take 1 tablet by mouth daily 90 tablet 0    clopidogrel (PLAVIX) 75 MG tablet Take 1 tablet by mouth daily 90 tablet 1    atorvastatin (LIPITOR) 10 MG tablet Take 1 tablet by mouth daily 90 tablet 1    aspirin 81 MG tablet Take 81 mg by mouth daily       No current facility-administered medications for this visit.         Last 3 BMP  Lab Results   Component Value Date/Time     08/25/2021 04:30 PM     07/12/2021 08:59 AM     02/15/2016 10:12 AM    K 4.7 08/25/2021 04:30 PM    K 4.1 07/12/2021 08:59 AM    K 4.2 02/15/2016 10:12 AM     08/25/2021 04:30 PM     07/12/2021 08:59 AM     02/15/2016 10:12 AM    CO2 28 08/25/2021 04:30 PM    CO2 24 07/12/2021 08:59 AM    CO2 26 02/15/2016 10:12 AM    BUN 9 08/25/2021 04:30 PM    BUN 11 07/12/2021 08:59 AM    BUN 8 02/15/2016 10:12 AM    CREATININE 0.6 08/25/2021 04:30 PM    CREATININE 0.6 07/12/2021 08:59 AM    CREATININE 0.7 46.2 08/25/2021 04:30 PM    MCV 87.2 08/25/2021 04:30 PM    MCH 27.9 08/25/2021 04:30 PM    MCHC 32.0 08/25/2021 04:30 PM    RDW 13.8 08/25/2021 04:30 PM     08/25/2021 04:30 PM    MPV 10.9 08/25/2021 04:30 PM       A1C:  Lab Results   Component Value Date/Time    LABA1C 5.8 (H) 07/12/2021 08:59 AM       Lipid panel:  Lab Results   Component Value Date    CHOL 207 07/12/2021    CHOL 173 11/11/2019    CHOL 370 02/15/2016    TRIG 335 07/12/2021    TRIG 237 11/11/2019    TRIG 566 02/15/2016    HDL 30 07/12/2021    HDL 36 11/11/2019    HDL 37 02/15/2016        Lab Results   Component Value Date/Time    PROT 7.0 08/25/2021 04:30 PM    PROT 6.8 07/12/2021 08:59 AM    PROT 6.6 02/15/2016 10:12 AM       No results found for: MG      Assessment. Holly Le was seen today for pre-op exam.    Diagnoses and all orders for this visit:    Pre-op testing  -     EKG 12 lead; Future  -     EKG 12 lead    Hyperthyroidism  -     TSH; Future  -     T4, FREE; Future  -     T3, FREE; Future    Coronary artery disease involving native coronary artery of native heart without angina pectoris    Chronic bilateral low back pain without sciatica    Weight loss    Other orders  -     methIMAzole (TAPAZOLE) 10 MG tablet;  Take 1 tablet by mouth daily       Patient Active Problem List   Diagnosis    Neuropathy    Hyperlipidemia    Coronary artery disease involving native coronary artery of native heart without angina pectoris    Generalized osteoarthritis    Weight loss    Diarrhea    Hyperthyroidism    Primary osteoarthritis of left knee    Chronic bilateral low back pain without sciatica       Plan: EKG shows sinus rhythm, PVC,  Patient asymptomatic,    I advised her we should not have the surgery done until thyroid is corrected and seen by endocrinologist    Start on Tapazole 10 mg daily  Monitor CBC and thyroid profile in 1 month    I will call endocrinologist see if they can give an earlier appointment  She is on the cancellation list    CBC with differential, thyroid profile in 1 month    Continue metoprolol 50 twice daily    Go to ER if any palpitation    Her surgery will be postponed      I called endocrinologist Dr. Zina Bryan they could not give me any earlier appointment for her follow-up for the hyperthyroidism    No follow-ups on file.        Naomy Eugene MD  10:17 AM  11/1/2021     [place]

## 2021-11-29 DIAGNOSIS — R73.9 HYPERGLYCEMIA: ICD-10-CM

## 2021-11-29 DIAGNOSIS — E05.90 HYPERTHYROIDISM: ICD-10-CM

## 2021-11-29 LAB
ALBUMIN SERPL-MCNC: 4.1 G/DL (ref 3.5–5.2)
ALP BLD-CCNC: 142 U/L (ref 35–104)
ALT SERPL-CCNC: 17 U/L (ref 0–32)
ANION GAP SERPL CALCULATED.3IONS-SCNC: 13 MMOL/L (ref 7–16)
AST SERPL-CCNC: 20 U/L (ref 0–31)
BASOPHILS ABSOLUTE: 0.03 E9/L (ref 0–0.2)
BASOPHILS RELATIVE PERCENT: 0.5 % (ref 0–2)
BILIRUB SERPL-MCNC: 0.3 MG/DL (ref 0–1.2)
BUN BLDV-MCNC: 9 MG/DL (ref 6–23)
CALCIUM SERPL-MCNC: 10.2 MG/DL (ref 8.6–10.2)
CHLORIDE BLD-SCNC: 104 MMOL/L (ref 98–107)
CO2: 24 MMOL/L (ref 22–29)
CREAT SERPL-MCNC: 0.6 MG/DL (ref 0.5–1)
EOSINOPHILS ABSOLUTE: 0.14 E9/L (ref 0.05–0.5)
EOSINOPHILS RELATIVE PERCENT: 2.5 % (ref 0–6)
GFR AFRICAN AMERICAN: >60
GFR NON-AFRICAN AMERICAN: >60 ML/MIN/1.73
GLUCOSE BLD-MCNC: 111 MG/DL (ref 74–99)
HBA1C MFR BLD: 5.6 % (ref 4–5.6)
HCT VFR BLD CALC: 48.7 % (ref 34–48)
HEMOGLOBIN: 15.5 G/DL (ref 11.5–15.5)
IMMATURE GRANULOCYTES #: 0.01 E9/L
IMMATURE GRANULOCYTES %: 0.2 % (ref 0–5)
LYMPHOCYTES ABSOLUTE: 1.76 E9/L (ref 1.5–4)
LYMPHOCYTES RELATIVE PERCENT: 31.9 % (ref 20–42)
MCH RBC QN AUTO: 28 PG (ref 26–35)
MCHC RBC AUTO-ENTMCNC: 31.8 % (ref 32–34.5)
MCV RBC AUTO: 88.1 FL (ref 80–99.9)
MONOCYTES ABSOLUTE: 0.46 E9/L (ref 0.1–0.95)
MONOCYTES RELATIVE PERCENT: 8.3 % (ref 2–12)
NEUTROPHILS ABSOLUTE: 3.12 E9/L (ref 1.8–7.3)
NEUTROPHILS RELATIVE PERCENT: 56.6 % (ref 43–80)
PDW BLD-RTO: 12.9 FL (ref 11.5–15)
PLATELET # BLD: 226 E9/L (ref 130–450)
PMV BLD AUTO: 10.3 FL (ref 7–12)
POTASSIUM SERPL-SCNC: 4.8 MMOL/L (ref 3.5–5)
RBC # BLD: 5.53 E12/L (ref 3.5–5.5)
SODIUM BLD-SCNC: 141 MMOL/L (ref 132–146)
T3 FREE: 9.1 PG/ML (ref 2–4.4)
T4 FREE: 2.43 NG/DL (ref 0.93–1.7)
TOTAL PROTEIN: 6.8 G/DL (ref 6.4–8.3)
TSH SERPL DL<=0.05 MIU/L-ACNC: <0.01 UIU/ML (ref 0.27–4.2)
WBC # BLD: 5.5 E9/L (ref 4.5–11.5)

## 2021-12-02 ENCOUNTER — OFFICE VISIT (OUTPATIENT)
Dept: FAMILY MEDICINE CLINIC | Age: 66
End: 2021-12-02
Payer: MEDICARE

## 2021-12-02 VITALS
TEMPERATURE: 97.5 F | WEIGHT: 182 LBS | SYSTOLIC BLOOD PRESSURE: 136 MMHG | BODY MASS INDEX: 26.11 KG/M2 | DIASTOLIC BLOOD PRESSURE: 74 MMHG | OXYGEN SATURATION: 95 % | HEART RATE: 115 BPM

## 2021-12-02 DIAGNOSIS — G89.29 CHRONIC BILATERAL LOW BACK PAIN WITH RIGHT-SIDED SCIATICA: ICD-10-CM

## 2021-12-02 DIAGNOSIS — M54.41 CHRONIC BILATERAL LOW BACK PAIN WITH RIGHT-SIDED SCIATICA: ICD-10-CM

## 2021-12-02 DIAGNOSIS — R63.4 WEIGHT LOSS: ICD-10-CM

## 2021-12-02 DIAGNOSIS — G60.9 IDIOPATHIC PERIPHERAL NEUROPATHY: ICD-10-CM

## 2021-12-02 DIAGNOSIS — E05.90 HYPERTHYROIDISM: Primary | ICD-10-CM

## 2021-12-02 DIAGNOSIS — I25.10 CORONARY ARTERY DISEASE INVOLVING NATIVE CORONARY ARTERY OF NATIVE HEART WITHOUT ANGINA PECTORIS: ICD-10-CM

## 2021-12-02 DIAGNOSIS — M15.9 GENERALIZED OSTEOARTHRITIS: ICD-10-CM

## 2021-12-02 PROCEDURE — 3017F COLORECTAL CA SCREEN DOC REV: CPT | Performed by: INTERNAL MEDICINE

## 2021-12-02 PROCEDURE — 4040F PNEUMOC VAC/ADMIN/RCVD: CPT | Performed by: INTERNAL MEDICINE

## 2021-12-02 PROCEDURE — 1123F ACP DISCUSS/DSCN MKR DOCD: CPT | Performed by: INTERNAL MEDICINE

## 2021-12-02 PROCEDURE — G8399 PT W/DXA RESULTS DOCUMENT: HCPCS | Performed by: INTERNAL MEDICINE

## 2021-12-02 PROCEDURE — G8484 FLU IMMUNIZE NO ADMIN: HCPCS | Performed by: INTERNAL MEDICINE

## 2021-12-02 PROCEDURE — G8427 DOCREV CUR MEDS BY ELIG CLIN: HCPCS | Performed by: INTERNAL MEDICINE

## 2021-12-02 PROCEDURE — 99213 OFFICE O/P EST LOW 20 MIN: CPT | Performed by: INTERNAL MEDICINE

## 2021-12-02 PROCEDURE — 1090F PRES/ABSN URINE INCON ASSESS: CPT | Performed by: INTERNAL MEDICINE

## 2021-12-02 PROCEDURE — 1036F TOBACCO NON-USER: CPT | Performed by: INTERNAL MEDICINE

## 2021-12-02 PROCEDURE — G8417 CALC BMI ABV UP PARAM F/U: HCPCS | Performed by: INTERNAL MEDICINE

## 2021-12-02 RX ORDER — CYCLOBENZAPRINE HCL 10 MG
10 TABLET ORAL DAILY
Qty: 90 TABLET | Refills: 0 | Status: SHIPPED | OUTPATIENT
Start: 2021-12-02 | End: 2022-03-21 | Stop reason: SDUPTHER

## 2021-12-02 RX ORDER — GABAPENTIN 300 MG/1
300 CAPSULE ORAL 3 TIMES DAILY
Qty: 270 CAPSULE | Refills: 1 | Status: SHIPPED | OUTPATIENT
Start: 2021-12-02 | End: 2022-03-21 | Stop reason: SDUPTHER

## 2021-12-02 RX ORDER — ATORVASTATIN CALCIUM 10 MG/1
10 TABLET, FILM COATED ORAL DAILY
Qty: 90 TABLET | Refills: 1 | Status: SHIPPED | OUTPATIENT
Start: 2021-12-02 | End: 2022-03-21 | Stop reason: SDUPTHER

## 2021-12-02 RX ORDER — METOPROLOL SUCCINATE 50 MG/1
50 TABLET, EXTENDED RELEASE ORAL 2 TIMES DAILY
Qty: 180 TABLET | Refills: 1 | Status: SHIPPED | OUTPATIENT
Start: 2021-12-02 | End: 2022-03-21 | Stop reason: SDUPTHER

## 2021-12-02 RX ORDER — DULOXETIN HYDROCHLORIDE 60 MG/1
60 CAPSULE, DELAYED RELEASE ORAL DAILY
Qty: 90 CAPSULE | Refills: 1 | Status: SHIPPED | OUTPATIENT
Start: 2021-12-02 | End: 2022-03-21 | Stop reason: SDUPTHER

## 2021-12-02 RX ORDER — CELECOXIB 200 MG/1
200 CAPSULE ORAL DAILY
Qty: 30 CAPSULE | Refills: 3 | Status: SHIPPED | OUTPATIENT
Start: 2021-12-02 | End: 2022-07-06

## 2021-12-02 RX ORDER — METHIMAZOLE 10 MG/1
10 TABLET ORAL 2 TIMES DAILY
Qty: 60 TABLET | Refills: 0 | Status: SHIPPED | OUTPATIENT
Start: 2021-12-02 | End: 2022-01-01

## 2021-12-02 RX ORDER — HYDROCODONE BITARTRATE AND ACETAMINOPHEN 7.5; 325 MG/1; MG/1
1 TABLET ORAL EVERY 8 HOURS PRN
Qty: 90 TABLET | Refills: 0 | Status: SHIPPED | OUTPATIENT
Start: 2021-12-02 | End: 2022-01-01

## 2021-12-02 RX ORDER — CLOPIDOGREL BISULFATE 75 MG/1
75 TABLET ORAL DAILY
Qty: 90 TABLET | Refills: 1 | Status: SHIPPED | OUTPATIENT
Start: 2021-12-02 | End: 2022-03-21 | Stop reason: SDUPTHER

## 2021-12-02 NOTE — PROGRESS NOTES
Subjective:     Chief Complaint   Patient presents with    1 Month Follow-Up   Patient here follow-up on the hyperthyroidism  She was started on Tapazole 10 mg daily she is tolerating that well, denies any side effects, her TSH still suppressed and T4 T3 still elevated, but clinically she says she feels better about 30%  She is not having any diarrhea  Eating very well    She has tried to call the endocrinologist they are waiting on appointment if there is any cancellation she does have an appointment in January        Past Medical History:   Diagnosis Date    Anxiety     CAD (coronary artery disease)     Chronic back pain     [facility]     Depression     GERD (gastroesophageal reflux disease)     Hyperlipidemia     Hypertension     [facility]ochondrial disease ([facility]     Neuropathy         Social History     Socioeconomic History    Marital status:      Spouse name: Not on file    Number of children: Not on file    Years of education: Not on file    Highest education level: Not on file   Occupational History    Not on file   Tobacco Use    Smoking status: Former Smoker     Packs/day: 1.00     Years: 27.00     Pack years: 27.00     Quit date: 2011     Years since quitting: 10.9    Smokeless tobacco: Never Used   Substance and Sexual Activity    Alcohol use: No    Drug use: No    Sexual activity: Not on file   Other Topics Concern    Not on file   Social History Narrative    Not on file     Social Determinants of Health     Financial Resource Strain:     Difficulty of Paying Living Expenses: Not on file   Food Insecurity: No Food Insecurity    Worried About [facility] in the Last Year: Never true    Sadie of Food in the Last Year: Never true   Transportation Needs: No Transportation Needs    Lack of Transportation (Medical): No    Lack of Transportation (Non-Medical):  No   Physical Activity:     Days of Exercise per Week: Not on file    Minutes of Exercise per Session: Not on file   Stress:     Feeling of Stress : Not on file   Social Connections:     Frequency of Communication with Friends and Family: Not on file    Frequency of Social Gatherings with Friends and Family: Not on file    Attends Catholic Services: Not on file    Active Member of [employer] or Organizations: Not on file    Attends Club or Organization Meetings: Not on file    Marital Status: Not on file   Intimate Partner Violence:     Fear of Current or Ex-Partner: Not on file    Emotionally Abused: Not on file    Physically Abused: Not on file    Sexually Abused: Not on file   Housing Stability:     Unable to Pay for Housing in the Last Year: Not on file    Number of [place] in the Last Year: Not on file    Unstable Housing in the Last Year: Not on file        Past Surgical History:   Procedure Laterality Date    [facility] WITH STENT PLACEMENT  2014    HYSTERECTOMY      NECK SURGERY      PARTIAL HYSTERECTOMY      TONSILLECTOMY          Family History   Problem Relation Age of Onset    Early Death Mother     Heart Disease Mother     Colon Cancer Father 80        Allergies   Allergen Reactions    Iodine Anaphylaxis and Hives    Evista [Raloxifene Hcl]         ROS  No acute distress  Cardiac: Denies any chest pain or palpitation  Seen by Dr. Constance Goode in December 2020  Has another appointment later this year  RCA stent by Dr. Peterson Pod test with Dr. Constance Goode this year was negative  Respiratory: Denies any cough or shortness of breath  Chest x-ray July 2021 -  GI: No abdominal pain.  Denies any nausea vomiting or diarrhea  Colonoscopy May 2016  Another colonoscopy March 2021 Dr. Martita Merrill in the clinic  CT abdomen pelvis August 2021 -ve  : Denies any dysuria frequency or hematuria  Neuro: No headache or dizziness  Endocrine: No diabetes  Skin: normal  No recent weight gain or weight loss  Denies any change in vision  Has chronic back pain she takes Norco as needed for the pain  She has neuropathy taking gabapentin and sometimes she requires Norco    Objective:    /74   Pulse 115   Temp 97.5 °F (36.4 °C)   Wt 182 lb (82.6 kg)   SpO2 95%   BMI 26.11 kg/m²     Constitutional: Alert awake and oriented  Eyes: Pupils equal bilaterally. Extraocular muscles intact  Neck: no JVD adenopathy no bruit  Heart:  RRR, no murmurs, gallops, or rubs. Heart rate appears very regular,  Pulse was 88, regular,    Lungs:    no wheeze, rales or rhonchi  Abd: bowel sounds present, nontender, nondistended, no masses  Extrem:  No clubbing, cyanosis, or edema  Neuro: AAOx3,No Focal deficit  Psychological: no depression or anxiety       Current Outpatient Medications   Medication Sig Dispense Refill    atorvastatin (LIPITOR) 10 MG tablet Take 1 tablet by mouth daily 90 tablet 1    celecoxib (CELEBREX) 200 MG capsule Take 1 capsule by mouth daily 30 capsule 3    clopidogrel (PLAVIX) 75 MG tablet Take 1 tablet by mouth daily 90 tablet 1    cyclobenzaprine (FLEXERIL) 10 MG tablet Take 1 tablet by mouth daily 90 tablet 0    DULoxetine (CYMBALTA) 60 MG extended release capsule Take 1 capsule by mouth daily 90 capsule 1    gabapentin (NEURONTIN) 300 MG capsule Take 1 capsule by mouth 3 times daily for 90 days. 270 capsule 1    metoprolol succinate (TOPROL XL) 50 MG extended release tablet Take 1 tablet by mouth 2 times daily 180 tablet 1    HYDROcodone-acetaminophen (LORCET PLUS) 7.5-325 MG per tablet Take 1 tablet by mouth every 8 hours as needed for Pain for up to 30 days. Take lowest dose possible to manage pain 90 tablet 0    methIMAzole (TAPAZOLE) 10 MG tablet Take 1 tablet by mouth 2 times daily 60 tablet 0    Handicap Placard MISC Until 12/2/2026 1 each 0    aspirin 81 MG tablet Take 81 mg by mouth daily       No current facility-administered medications for this visit.         Last 3 BMP  Lab Results   Component Value Date/Time     11/29/2021 02:20 PM     08/25/2021 04:30 PM     07/12/2021 08:59 AM    K 4.8 11/29/2021 02:20 PM    K 4.7 08/25/2021 04:30 PM    K 4.1 07/12/2021 08:59 AM     11/29/2021 02:20 PM     08/25/2021 04:30 PM     07/12/2021 08:59 AM    CO2 24 11/29/2021 02:20 PM    CO2 28 08/25/2021 04:30 PM    CO2 24 07/12/2021 08:59 AM    BUN 9 11/29/2021 02:20 PM    BUN 9 08/25/2021 04:30 PM    BUN 11 07/12/2021 08:59 AM    CREATININE 0.6 11/29/2021 02:20 PM    CREATININE 0.6 08/25/2021 04:30 PM    CREATININE 0.6 07/12/2021 08:59 AM    GLUCOSE 111 (H) 11/29/2021 02:20 PM    GLUCOSE 110 (H) 08/25/2021 04:30 PM    GLUCOSE 113 (H) 07/12/2021 08:59 AM    GLUCOSE 97 02/13/2012 10:20 AM    CALCIUM 10.2 11/29/2021 02:20 PM    CALCIUM 9.5 08/25/2021 04:30 PM    CALCIUM 9.1 07/12/2021 08:59 AM       Last 3 CMP:    Lab Results   Component Value Date/Time     11/29/2021 02:20 PM     08/25/2021 04:30 PM     07/12/2021 08:59 AM    K 4.8 11/29/2021 02:20 PM    K 4.7 08/25/2021 04:30 PM    K 4.1 07/12/2021 08:59 AM     11/29/2021 02:20 PM     08/25/2021 04:30 PM     07/12/2021 08:59 AM    CO2 24 11/29/2021 02:20 PM    CO2 28 08/25/2021 04:30 PM    CO2 24 07/12/2021 08:59 AM    BUN 9 11/29/2021 02:20 PM    BUN 9 08/25/2021 04:30 PM    BUN 11 07/12/2021 08:59 AM    CREATININE 0.6 11/29/2021 02:20 PM    CREATININE 0.6 08/25/2021 04:30 PM    CREATININE 0.6 07/12/2021 08:59 AM    GLUCOSE 111 (H) 11/29/2021 02:20 PM    GLUCOSE 110 (H) 08/25/2021 04:30 PM    GLUCOSE 113 (H) 07/12/2021 08:59 AM    GLUCOSE 97 02/13/2012 10:20 AM    CALCIUM 10.2 11/29/2021 02:20 PM    CALCIUM 9.5 08/25/2021 04:30 PM    CALCIUM 9.1 07/12/2021 08:59 AM    PROT 6.8 11/29/2021 02:20 PM    PROT 7.0 08/25/2021 04:30 PM    PROT 6.8 07/12/2021 08:59 AM    LABALBU 4.1 11/29/2021 02:20 PM    LABALBU 4.1 08/25/2021 04:30 PM    LABALBU 3.8 07/12/2021 08:59 AM    LABALBU 4.5 02/13/2012 10:20 AM    BILITOT 0.3 11/29/2021 02:20 PM    BILITOT 0.3 08/25/2021 04:30 PM    BILITOT <0.2 07/12/2021 08:59 AM    ALKPHOS 142 (H) 11/29/2021 02:20 PM    ALKPHOS 124 (H) 08/25/2021 04:30 PM    ALKPHOS 109 (H) 07/12/2021 08:59 AM    AST 20 11/29/2021 02:20 PM    AST 31 08/25/2021 04:30 PM    AST 15 07/12/2021 08:59 AM    ALT 17 11/29/2021 02:20 PM    ALT 28 08/25/2021 04:30 PM    ALT 14 07/12/2021 08:59 AM        CBC:   Lab Results   Component Value Date/Time    WBC 5.5 11/29/2021 02:20 PM    RBC 5.53 (H) 11/29/2021 02:20 PM    HGB 15.5 11/29/2021 02:20 PM    HCT 48.7 (H) 11/29/2021 02:20 PM    MCV 88.1 11/29/2021 02:20 PM    MCH 28.0 11/29/2021 02:20 PM    MCHC 31.8 (L) 11/29/2021 02:20 PM    RDW 12.9 11/29/2021 02:20 PM     11/29/2021 02:20 PM    MPV 10.3 11/29/2021 02:20 PM       A1C:  Lab Results   Component Value Date/Time    LABA1C 5.6 11/29/2021 02:20 PM       Lipid panel:  Lab Results   Component Value Date    CHOL 207 07/12/2021    CHOL 173 11/11/2019    CHOL 370 02/15/2016    TRIG 335 07/12/2021    TRIG 237 11/11/2019    TRIG 566 02/15/2016    HDL 30 07/12/2021    HDL 36 11/11/2019    HDL 37 02/15/2016        Lab Results   Component Value Date/Time    PROT 6.8 11/29/2021 02:20 PM    PROT 7.0 08/25/2021 04:30 PM    PROT 6.8 07/12/2021 08:59 AM       No results found for: MG      Juan Mane was seen today for 1 month follow-up. Diagnoses and all orders for this visit:    Hyperthyroidism  -     CBC WITH AUTO DIFFERENTIAL; Future  -     COMPREHENSIVE METABOLIC PANEL; Future  -     TSH; Future  -     T4, FREE; Future  -     T3, FREE; Future    Generalized osteoarthritis  -     Handicap Placard MISC; Until 12/2/2026  -     CBC WITH AUTO DIFFERENTIAL; Future    Chronic bilateral low back pain with right-sided sciatica  -     HYDROcodone-acetaminophen (LORCET PLUS) 7.5-325 MG per tablet; Take 1 tablet by mouth every 8 hours as needed for Pain for up to 30 days.  Take lowest dose possible to manage pain  -     Lacho Dhillon MD, Pain Medicine, [facility]; Until 12/2/2026  -     CBC WITH AUTO DIFFERENTIAL; Future    Weight loss    Coronary artery disease involving native coronary artery of native heart without angina pectoris    Idiopathic peripheral neuropathy    Other orders  -     atorvastatin (LIPITOR) 10 MG tablet; Take 1 tablet by mouth daily  -     celecoxib (CELEBREX) 200 MG capsule; Take 1 capsule by mouth daily  -     clopidogrel (PLAVIX) 75 MG tablet; Take 1 tablet by mouth daily  -     cyclobenzaprine (FLEXERIL) 10 MG tablet; Take 1 tablet by mouth daily  -     DULoxetine (CYMBALTA) 60 MG extended release capsule; Take 1 capsule by mouth daily  -     gabapentin (NEURONTIN) 300 MG capsule; Take 1 capsule by mouth 3 times daily for 90 days. -     metoprolol succinate (TOPROL XL) 50 MG extended release tablet; Take 1 tablet by mouth 2 times daily  -     methIMAzole (TAPAZOLE) 10 MG tablet;  Take 1 tablet by mouth 2 times daily       Patient Active Problem List   Diagnosis    Neuropathy    Hyperlipidemia    Coronary artery disease involving native coronary artery of native heart without angina pectoris    Generalized osteoarthritis    Weight loss    Diarrhea    Hyperthyroidism    Primary osteoarthritis of left knee    Chronic bilateral low back pain without sciatica       Plan: Labs reviewed  TSH suppressed and T3-T4 elevated  Increase Tapazole to 10 mg twice daily  Patient clinically feeling slightly better on Tapazole  Check CBC CMP TSH T3-T4 in 4 weeks  I have tried 2 endocrinologist could not get earlier appointment  Patient declined to go to tertiary care center, she says she is feeling better    Advised to call the endocrinologist again see if she can get earlier appointment  1 endocrinologist give the appointment in March which was too far    Weight loss is stable overall she feels better    Chronic back pain with sciatica and peripheral neuropathy referred to pain clinic prescription for Percocet was given OARRS report reviewed    CAD stable    All the medications reviewed    Follow-up in 4 weeks    Return in about 4 weeks (around 12/30/2021).        Valdemar Watkins MD  5:57 PM  12/2/2021     [place]

## 2021-12-16 ENCOUNTER — OFFICE VISIT (OUTPATIENT)
Dept: PAIN MANAGEMENT | Age: 66
End: 2021-12-16
Payer: MEDICARE

## 2021-12-16 VITALS
BODY MASS INDEX: 26.05 KG/M2 | HEART RATE: 94 BPM | OXYGEN SATURATION: 96 % | SYSTOLIC BLOOD PRESSURE: 140 MMHG | TEMPERATURE: 95.7 F | DIASTOLIC BLOOD PRESSURE: 80 MMHG | HEIGHT: 70 IN | RESPIRATION RATE: 16 BRPM | WEIGHT: 182 LBS

## 2021-12-16 DIAGNOSIS — M51.9 LUMBAR DISC DISORDER: ICD-10-CM

## 2021-12-16 DIAGNOSIS — G89.4 CHRONIC PAIN SYNDROME: ICD-10-CM

## 2021-12-16 DIAGNOSIS — M16.11 PRIMARY OSTEOARTHRITIS OF RIGHT HIP: Primary | ICD-10-CM

## 2021-12-16 DIAGNOSIS — M47.816 LUMBAR SPONDYLOSIS: ICD-10-CM

## 2021-12-16 DIAGNOSIS — M47.816 LUMBAR FACET ARTHROPATHY: ICD-10-CM

## 2021-12-16 DIAGNOSIS — M54.16 LUMBAR RADICULOPATHY: ICD-10-CM

## 2021-12-16 PROCEDURE — 99204 OFFICE O/P NEW MOD 45 MIN: CPT | Performed by: PAIN MEDICINE

## 2021-12-16 PROCEDURE — 1123F ACP DISCUSS/DSCN MKR DOCD: CPT | Performed by: PAIN MEDICINE

## 2021-12-16 PROCEDURE — 1090F PRES/ABSN URINE INCON ASSESS: CPT | Performed by: PAIN MEDICINE

## 2021-12-16 PROCEDURE — G8484 FLU IMMUNIZE NO ADMIN: HCPCS | Performed by: PAIN MEDICINE

## 2021-12-16 PROCEDURE — 1036F TOBACCO NON-USER: CPT | Performed by: PAIN MEDICINE

## 2021-12-16 PROCEDURE — 4040F PNEUMOC VAC/ADMIN/RCVD: CPT | Performed by: PAIN MEDICINE

## 2021-12-16 PROCEDURE — G8399 PT W/DXA RESULTS DOCUMENT: HCPCS | Performed by: PAIN MEDICINE

## 2021-12-16 PROCEDURE — G8427 DOCREV CUR MEDS BY ELIG CLIN: HCPCS | Performed by: PAIN MEDICINE

## 2021-12-16 PROCEDURE — 3017F COLORECTAL CA SCREEN DOC REV: CPT | Performed by: PAIN MEDICINE

## 2021-12-16 PROCEDURE — G8417 CALC BMI ABV UP PARAM F/U: HCPCS | Performed by: PAIN MEDICINE

## 2021-12-16 NOTE — PROGRESS NOTES
Do you currently have any of the following:    Fever: No  Headache:  No  Cough: No  Shortness of breath: No  Exposed to anyone with these symptoms: Melania                                                                                                                Jameel Juarez presents to the [facility] on 12/16/2021. Lm King is complaining of pain in her lower back with right sided sciatica. . The pain is constant. The pain is described as aching and throbbing. Pain is rated on her best day at a 5, on her worst day at a 10, and on average at a 7 on the VAS scale. She took her last dose of Neurontin and flexeril and lorcet plus . Lm King does not have issues with constipation. Any procedures since your last visit: No,    She is  on NSAIDS and  is  on anticoagulation medications to include ASA and Plavix and is managed by Dr. Mikhail Guerra. Pacemaker or defibrillator: No Physician managing device is NA. Medication Contract and Consent for Opioid Use Documents Filed      No documents found                   BP (!) 140/80   Pulse 94   Temp 95.7 °F (35.4 °C) (Infrared)   Resp 16   Ht 5' 10\" (1.778 m)   Wt 182 lb (82.6 kg)   SpO2 96%   BMI 26.11 kg/m²      No LMP recorded. Patient has had a hysterectomy.

## 2021-12-16 NOTE — PROGRESS NOTES
[address], [address] [place] [address]      [phone]          Consult Note      Patient:  MARK Wilkerson 1955    Date of Service:  21    Requesting Physician:  Julianne Livingston MD    Reason for Consult:      Patient presents with complaints of low back pain that started a long time ago and has been progressively getting worse. HISTORY OF PRESENT ILLNESS:      Pain does radiate to right thigh (anterior aspect) and right groin . She  does not have numbness, tingling, weakness and does not have bladder or bowel dysfunction. She has been on anticoagulation medications to include Plavix. The patient  has not been on herbal supplements. The patient is not diabetic. Imaging:   Lumbar spine Xray 2016:  1. Left convex scoliosis. 2. Multilevel degenerative spondylosis throughout the lumbar spine. 3. Facet arthritis from L2-S1.   4. No acute abnormality is identified. Previous treatments: Physical Therapy and medications. .      Opioid Agreement:  Date enacted:N/A  Renewal date:N/A    Past Medical History:   Diagnosis Date    Anxiety     CAD (coronary artery disease)     Chronic back pain     COPD (chronic obstructive pulmonary disease) (HCC)     Depression     GERD (gastroesophageal reflux disease)     Hyperlipidemia     Hypertension     [facility]     Neuropathy      Past Surgical History:   Procedure Laterality Date    ABDOMINAL EXPLORATION SURGERY      APPENDECTOMY      CHOLECYSTECTOMY      CORONARY ANGIOPLASTY WITH STENT PLACEMENT      HYSTERECTOMY      NECK SURGERY      PARTIAL HYSTERECTOMY      TONSILLECTOMY       Prior to Admission medications    Medication Sig Start Date End Date Taking?  Authorizing Provider   atorvastatin (LIPITOR) 10 MG tablet Take 1 tablet by mouth daily 21  Yes Claudia Duverney, MD   celecoxib (CELEBREX) 200 MG capsule Take 1 capsule by mouth daily 12/2/21  Yes Valdemar Watkins MD   clopidogrel (PLAVIX) 75 MG tablet Take 1 tablet by mouth daily 12/2/21  Yes Valdemar Watkins MD   cyclobenzaprine (FLEXERIL) 10 MG tablet Take 1 tablet by mouth daily 12/2/21  Yes Valdemar Watkins MD   DULoxetine (CYMBALTA) 60 MG extended release capsule Take 1 capsule by mouth daily 12/2/21  Yes Valdemar Watkins MD   gabapentin (NEURONTIN) 300 MG capsule Take 1 capsule by mouth 3 times daily for 90 days. 12/2/21 3/2/22 Yes Valdemar Watkins MD   metoprolol succinate (TOPROL XL) 50 MG extended release tablet Take 1 tablet by mouth 2 times daily 12/2/21  Yes Valdemar Watkins MD   HYDROcodone-acetaminophen (LORCET PLUS) 7.5-325 MG per tablet Take 1 tablet by mouth every 8 hours as needed for Pain for up to 30 days. Take lowest dose possible to manage pain 12/2/21 1/1/22 Yes Valdemar Watkins MD   methIMAzole (TAPAZOLE) 10 MG tablet Take 1 tablet by mouth 2 times daily 12/2/21 1/1/22 Yes Valdemar Watkins MD   Handicap Placard MISC Until 12/2/2026 12/2/21  Yes Valdemar Watkins MD   aspirin 81 MG tablet Take 81 mg by mouth daily   Yes Kush Provider, MD     Allergies   Allergen Reactions    Iodine Anaphylaxis and Hives    Evista [Raloxifene Hcl]      Social History     Socioeconomic History    Marital status:       Spouse name: Not on file    Number of children: Not on file    Years of education: Not on file    Highest education level: Not on file   Occupational History    Not on file   Tobacco Use    Smoking status: Former Smoker     Packs/day: 1.00     Years: 27.00     Pack years: 27.00     Quit date: 2011     Years since quitting: 10.9    Smokeless tobacco: Never Used   Substance and Sexual Activity    Alcohol use: No    Drug use: No    Sexual activity: Not on file   Other Topics Concern    Not on file   Social History Narrative    Not on file     Social Determinants of Health     Financial Resource Strain:     Difficulty of Paying Living Expenses: Not on file   Food Insecurity: No Food Insecurity    Worried About Running Out of Food in the Last Year: Never true    Ran Out of Food in the Last Year: Never true   Transportation Needs: No Transportation Needs    Lack of Transportation (Medical): No    Lack of Transportation (Non-Medical): No   Physical Activity:     Days of Exercise per Week: Not on file    Minutes of Exercise per Session: Not on file   Stress:     Feeling of Stress : Not on file   Social Connections:     Frequency of Communication with Friends and Family: Not on file    Frequency of Social Gatherings with Friends and Family: Not on file    Attends Sabianist Services: Not on file    Active Member of [address] [place] [address] [employer] or Organizations: Not on file    Attends Club or Organization Meetings: Not on file    Marital Status: Not on file   Intimate Partner Violence:     Fear of Current or Ex-Partner: Not on file    Emotionally Abused: Not on file    Physically Abused: Not on file    Sexually Abused: Not on file   Housing Stability:     Unable to Pay for Housing in the Last Year: Not on file    Number of [place] in the Last Year: Not on file    Unstable Housing in the Last Year: Not on file     Family History   Problem Relation Age of Onset    Early Death Mother     Heart Disease Mother     Colon Cancer Father 80     REVIEW OF SYSTEMS:     Patient specifically denies fever/chills, chest pain, shortness of breath, new bowel or bladder complaints. All other review of systems was negative. PHYSICAL EXAMINATION:      BP (!) 140/80   Pulse 94   Temp 95.7 °F (35.4 °C) (Infrared)   Resp 16   Ht 5' 10\" (1.778 m)   Wt 182 lb (82.6 kg)   SpO2 96%   BMI 26.11 kg/m²     General:      General appearance:   pleasant and well-hydrated.    , in moderate discomfort and A & O x3  Build:Normal Weight    HEENT:    Head:normocephalic and atraumatic  Sclera: icterus absent,     Lungs:    Breathing:Breathing Pattern: regular, no distress    Abdomen:    Shape:non-distended and normal  Tenderness:none    Cervical spine:    Inspection:anterior fusion scar     Lumbar spine:    Spine inspection:scoliosis   CVA tenderness:No   Palpation:tenderness paravertebral muscles, facet loading, left, right, positive and tenderness. Range of motion:abnormal moderately Lateral bending, flexion, extension rotation bilateral and is  painful. Musculoskeletal:    Trigger points in Paraveteral:absent bilaterally  SI joint tenderness:positive right, positive left              STEPHANIE test:negative right, negative             left  Piriformis tenderness:negative right, negative left  Trochanteric bursa tenderness:positive right, positive left  SLR:negative right, negative left, sitting     Extremities:    Tremors:None bilaterally upper and lower  Range of motion:pain with internal rotation of hips positive right  Intact:Yes  Edema:Normal    Neurological:    Sensory:normal to light touch bilateral lower extremities. Motor:              Right Bicep5-/5           Left Bicep5-/5              Right Triceps5-/5       Left Triceps5-/5          Right Deltoid5-/5     Left Deltoid5-/5                  Right Quadriceps5-/5          Left Quadriceps5-/5           Right Gastrocnemius5-/5    Left Gastrocnemius5-/5  Right Ant Tibialis5-/5  Left Ant Tibialis5-/5  Reflexes:    Right Brachioradialis reflex2+  Left Brachioradialis reflex2+  Right Biceps reflex2+  Left Biceps reflex2+  Right Triceps reflex2+  Left Triceps reflex2+  Right Quadriceps reflex2+  Left Quadriceps reflex2+  Right Achilles reflex2+  Left Achilles reflex2+  Gait:antalgic    Dermatology:    Skin:no unusual rashes and no skin lesions    Impression:  Low back pain with radiation to the right thigh and groin. Plan:  ?? L4-5 degenerative disc disease with right L4 radiculopathy. Reviewed lumbar spine imaging studies. Will schedule patient for lumbar spine MRI and right hip xray.   Patient had failed physical therapy multiple time. Currently on Norco 7.5 QD PRN, will take over. Cancel urine screen. OARRS report reviewed 12/2021. Patient encouraged to stay active and to lose weight. Treatment plan discussed with the patient including medications side effects. We discussed with the patient that combining opioids, benzodiazepines, alcohol, illicit drugs or sleep aids increases the risk of respiratory depression including death. We discussed that these medications may cause drowsiness, sedation or dizziness and have counseled the patient not to drive or operate machinery. We have discussed that these medications will be prescribed only by one provider. We have discussed with the patient about age related risk factors and have thoroughly discussed the importance of taking these medications as prescribed. The patient verbalizes understanding. ccrefcathying garrett Russo M.D.

## 2021-12-22 ENCOUNTER — HOSPITAL ENCOUNTER (OUTPATIENT)
Dept: MRI IMAGING | Age: 66
Discharge: HOME | End: 2021-12-24
Payer: MEDICARE

## 2021-12-22 DIAGNOSIS — M54.16 LUMBAR RADICULOPATHY: ICD-10-CM

## 2021-12-22 PROCEDURE — 72148 MRI LUMBAR SPINE W/O DYE: CPT

## 2022-01-03 DIAGNOSIS — E05.90 HYPERTHYROIDISM: ICD-10-CM

## 2022-01-03 DIAGNOSIS — M54.41 CHRONIC BILATERAL LOW BACK PAIN WITH RIGHT-SIDED SCIATICA: ICD-10-CM

## 2022-01-03 DIAGNOSIS — M15.9 GENERALIZED OSTEOARTHRITIS: ICD-10-CM

## 2022-01-03 DIAGNOSIS — G89.29 CHRONIC BILATERAL LOW BACK PAIN WITH RIGHT-SIDED SCIATICA: ICD-10-CM

## 2022-01-03 LAB
ALBUMIN SERPL-MCNC: 4.2 G/DL (ref 3.5–5.2)
ALP BLD-CCNC: 165 U/L (ref 35–104)
ALT SERPL-CCNC: 23 U/L (ref 0–32)
ANION GAP SERPL CALCULATED.3IONS-SCNC: 15 MMOL/L (ref 7–16)
AST SERPL-CCNC: 27 U/L (ref 0–31)
BASOPHILS ABSOLUTE: 0.02 E9/L (ref 0–0.2)
BASOPHILS RELATIVE PERCENT: 0.5 % (ref 0–2)
BILIRUB SERPL-MCNC: 0.2 MG/DL (ref 0–1.2)
BUN BLDV-MCNC: 8 MG/DL (ref 6–23)
CALCIUM SERPL-MCNC: 9.7 MG/DL (ref 8.6–10.2)
CHLORIDE BLD-SCNC: 102 MMOL/L (ref 98–107)
CO2: 24 MMOL/L (ref 22–29)
CREAT SERPL-MCNC: 0.6 MG/DL (ref 0.5–1)
EOSINOPHILS ABSOLUTE: 0.05 E9/L (ref 0.05–0.5)
EOSINOPHILS RELATIVE PERCENT: 1.4 % (ref 0–6)
GFR AFRICAN AMERICAN: >60
GFR NON-AFRICAN AMERICAN: >60 ML/MIN/1.73
GLUCOSE BLD-MCNC: 109 MG/DL (ref 74–99)
HCT VFR BLD CALC: 49.6 % (ref 34–48)
HEMOGLOBIN: 15.6 G/DL (ref 11.5–15.5)
IMMATURE GRANULOCYTES #: 0.01 E9/L
IMMATURE GRANULOCYTES %: 0.3 % (ref 0–5)
LYMPHOCYTES ABSOLUTE: 1.3 E9/L (ref 1.5–4)
LYMPHOCYTES RELATIVE PERCENT: 35.3 % (ref 20–42)
MCH RBC QN AUTO: 27.2 PG (ref 26–35)
MCHC RBC AUTO-ENTMCNC: 31.5 % (ref 32–34.5)
MCV RBC AUTO: 86.6 FL (ref 80–99.9)
MONOCYTES ABSOLUTE: 0.42 E9/L (ref 0.1–0.95)
MONOCYTES RELATIVE PERCENT: 11.4 % (ref 2–12)
NEUTROPHILS ABSOLUTE: 1.88 E9/L (ref 1.8–7.3)
NEUTROPHILS RELATIVE PERCENT: 51.1 % (ref 43–80)
PDW BLD-RTO: 13.7 FL (ref 11.5–15)
PLATELET # BLD: 198 E9/L (ref 130–450)
PMV BLD AUTO: 10.6 FL (ref 7–12)
POTASSIUM SERPL-SCNC: 4.4 MMOL/L (ref 3.5–5)
RBC # BLD: 5.73 E12/L (ref 3.5–5.5)
SODIUM BLD-SCNC: 141 MMOL/L (ref 132–146)
T3 FREE: 3.7 PG/ML (ref 2–4.4)
T4 FREE: 1.24 NG/DL (ref 0.93–1.7)
TOTAL PROTEIN: 7.3 G/DL (ref 6.4–8.3)
TSH SERPL DL<=0.05 MIU/L-ACNC: <0.01 UIU/ML (ref 0.27–4.2)
WBC # BLD: 3.7 E9/L (ref 4.5–11.5)

## 2022-01-05 ENCOUNTER — OFFICE VISIT (OUTPATIENT)
Dept: FAMILY MEDICINE CLINIC | Age: 67
End: 2022-01-05
Payer: MEDICARE

## 2022-01-05 VITALS
WEIGHT: 182 LBS | BODY MASS INDEX: 26.11 KG/M2 | DIASTOLIC BLOOD PRESSURE: 72 MMHG | HEART RATE: 85 BPM | SYSTOLIC BLOOD PRESSURE: 136 MMHG | TEMPERATURE: 97 F | OXYGEN SATURATION: 96 %

## 2022-01-05 DIAGNOSIS — M47.816 LUMBAR SPONDYLOSIS: ICD-10-CM

## 2022-01-05 DIAGNOSIS — Z23 NEED FOR IMMUNIZATION AGAINST INFLUENZA: ICD-10-CM

## 2022-01-05 DIAGNOSIS — E05.90 HYPERTHYROIDISM: Primary | ICD-10-CM

## 2022-01-05 DIAGNOSIS — I25.10 CORONARY ARTERY DISEASE INVOLVING NATIVE CORONARY ARTERY OF NATIVE HEART WITHOUT ANGINA PECTORIS: ICD-10-CM

## 2022-01-05 DIAGNOSIS — E78.5 HYPERLIPIDEMIA, UNSPECIFIED HYPERLIPIDEMIA TYPE: ICD-10-CM

## 2022-01-05 DIAGNOSIS — G62.9 PERIPHERAL POLYNEUROPATHY: ICD-10-CM

## 2022-01-05 DIAGNOSIS — J44.9 CHRONIC OBSTRUCTIVE PULMONARY DISEASE, UNSPECIFIED COPD TYPE (HCC): ICD-10-CM

## 2022-01-05 DIAGNOSIS — D72.818 OTHER DECREASED WHITE BLOOD CELL (WBC) COUNT: ICD-10-CM

## 2022-01-05 PROCEDURE — 90694 VACC AIIV4 NO PRSRV 0.5ML IM: CPT | Performed by: INTERNAL MEDICINE

## 2022-01-05 PROCEDURE — 4040F PNEUMOC VAC/ADMIN/RCVD: CPT | Performed by: INTERNAL MEDICINE

## 2022-01-05 PROCEDURE — 3017F COLORECTAL CA SCREEN DOC REV: CPT | Performed by: INTERNAL MEDICINE

## 2022-01-05 PROCEDURE — 1036F TOBACCO NON-USER: CPT | Performed by: INTERNAL MEDICINE

## 2022-01-05 PROCEDURE — 1123F ACP DISCUSS/DSCN MKR DOCD: CPT | Performed by: INTERNAL MEDICINE

## 2022-01-05 PROCEDURE — G8484 FLU IMMUNIZE NO ADMIN: HCPCS | Performed by: INTERNAL MEDICINE

## 2022-01-05 PROCEDURE — 99213 OFFICE O/P EST LOW 20 MIN: CPT | Performed by: INTERNAL MEDICINE

## 2022-01-05 PROCEDURE — G8427 DOCREV CUR MEDS BY ELIG CLIN: HCPCS | Performed by: INTERNAL MEDICINE

## 2022-01-05 PROCEDURE — 3023F SPIROM DOC REV: CPT | Performed by: INTERNAL MEDICINE

## 2022-01-05 PROCEDURE — G8417 CALC BMI ABV UP PARAM F/U: HCPCS | Performed by: INTERNAL MEDICINE

## 2022-01-05 PROCEDURE — 1090F PRES/ABSN URINE INCON ASSESS: CPT | Performed by: INTERNAL MEDICINE

## 2022-01-05 PROCEDURE — G8399 PT W/DXA RESULTS DOCUMENT: HCPCS | Performed by: INTERNAL MEDICINE

## 2022-01-05 PROCEDURE — G0008 ADMIN INFLUENZA VIRUS VAC: HCPCS | Performed by: INTERNAL MEDICINE

## 2022-01-05 RX ORDER — METHIMAZOLE 10 MG/1
10 TABLET ORAL 2 TIMES DAILY
COMMUNITY
End: 2022-01-11 | Stop reason: SDUPTHER

## 2022-01-05 NOTE — PROGRESS NOTES
Subjective:     Chief Complaint   Patient presents with    Other     Hyperthyroidism   Patient is here for follow-up of the hypothyroidism and the weight loss, she is not losing anymore weight,  Her T4 and T3 are returning to normal  TSH is still suppressed, she was started on methimazole 10 mg daily, it was increased to twice daily, which has helped,  His white count has slightly dropped, 3.7  I could not do methimazole to 10 in the morning half in the evening,  I have tried different endocrinologist I could not get sooner appointment for her, I even called the endocrinologist office myself, was not able to get an earlier appointment  She has appointment in 10 days she will keep that appointment    She was losing weight she had a CT abdomen pelvis 2021, chest x-ray 2021 was negative  She is not losing anymore weight since her thyroid is coming under control    She has chronic back pain she was referred to pain clinic she has been seen by them and going for an MRI    Past Medical History:   Diagnosis Date    Anxiety     CAD (coronary artery disease)     Chronic back pain     COPD (chronic obstructive pulmonary disease) ([address]     Depression     GERD (gastroesophageal reflux disease)     Hyperlipidemia     Hypertension     Mitochondrial disease ([facility]     Neuropathy         Social History     Socioeconomic History    Marital status:       Spouse name: Not on file    Number of children: Not on file    Years of education: Not on file    Highest education level: Not on file   Occupational History    Not on file   Tobacco Use    Smoking status: Former Smoker     Packs/day: 1.00     Years: 27.00     Pack years: 27.00     Quit date:      Years since quittin.0    Smokeless tobacco: Never Used   Substance and Sexual Activity    Alcohol use: No    Drug use: No    Sexual activity: Not on file   Other Topics Concern    Not on file   Social History Narrative    Not on file per patient  Denies any palpitation  Denies any arrhythmias  Respiratory: Denies any cough or shortness of breath  Chest x-ray July 2021 -  GI: No abdominal pain. Denies any nausea vomiting or diarrhea  Colonoscopy May 2016  Another colonoscopy March 2021 Dr. Omid Lindsey in the clinic  CT abdomen pelvis August 2021 -ve  : Denies any dysuria frequency or hematuria  Neuro: No headache or dizziness  Endocrine: No diabetes  Skin: normal  No recent weight gain or weight loss  Denies any change in vision  Has chronic back pain she takes Norco as needed for the pain  She has neuropathy taking gabapentin and sometimes she requires Norco    Objective:    /72   Pulse 85   Temp 97 °F (36.1 °C)   Wt 182 lb (82.6 kg)   SpO2 96%   BMI 26.11 kg/m²     Constitutional: Alert awake and oriented  Eyes: Pupils equal bilaterally. Extraocular muscles intact  Neck: no JVD adenopathy no bruit  Heart:  RRR, no murmurs, gallops, or rubs. Lungs:    no wheeze, rales or rhonchi  Abd: bowel sounds present, nontender, nondistended, no masses  Extrem:  No clubbing, cyanosis, or edema  Neuro: AAOx3,No Focal deficit  Psychological: no depression or anxiety       Current Outpatient Medications   Medication Sig Dispense Refill    methIMAzole (TAPAZOLE) 10 MG tablet Take 10 mg by mouth 2 times daily      atorvastatin (LIPITOR) 10 MG tablet Take 1 tablet by mouth daily 90 tablet 1    celecoxib (CELEBREX) 200 MG capsule Take 1 capsule by mouth daily 30 capsule 3    clopidogrel (PLAVIX) 75 MG tablet Take 1 tablet by mouth daily 90 tablet 1    cyclobenzaprine (FLEXERIL) 10 MG tablet Take 1 tablet by mouth daily 90 tablet 0    DULoxetine (CYMBALTA) 60 MG extended release capsule Take 1 capsule by mouth daily 90 capsule 1    gabapentin (NEURONTIN) 300 MG capsule Take 1 capsule by mouth 3 times daily for 90 days.  270 capsule 1    metoprolol succinate (TOPROL XL) 50 MG extended release tablet Take 1 tablet by mouth 2 times daily 180 tablet 1  Handicap Placard MISC Until 12/2/2026 1 each 0    aspirin 81 MG tablet Take 81 mg by mouth daily       No current facility-administered medications for this visit.         Last 3 BMP  Lab Results   Component Value Date/Time     01/03/2022 11:10 AM     11/29/2021 02:20 PM     08/25/2021 04:30 PM    K 4.4 01/03/2022 11:10 AM    K 4.8 11/29/2021 02:20 PM    K 4.7 08/25/2021 04:30 PM     01/03/2022 11:10 AM     11/29/2021 02:20 PM     08/25/2021 04:30 PM    CO2 24 01/03/2022 11:10 AM    CO2 24 11/29/2021 02:20 PM    CO2 28 08/25/2021 04:30 PM    BUN 8 01/03/2022 11:10 AM    BUN 9 11/29/2021 02:20 PM    BUN 9 08/25/2021 04:30 PM    CREATININE 0.6 01/03/2022 11:10 AM    CREATININE 0.6 11/29/2021 02:20 PM    CREATININE 0.6 08/25/2021 04:30 PM    GLUCOSE 109 (H) 01/03/2022 11:10 AM    GLUCOSE 111 (H) 11/29/2021 02:20 PM    GLUCOSE 110 (H) 08/25/2021 04:30 PM    GLUCOSE 97 02/13/2012 10:20 AM    CALCIUM 9.7 01/03/2022 11:10 AM    CALCIUM 10.2 11/29/2021 02:20 PM    CALCIUM 9.5 08/25/2021 04:30 PM       Last 3 CMP:    Lab Results   Component Value Date/Time     01/03/2022 11:10 AM     11/29/2021 02:20 PM     08/25/2021 04:30 PM    K 4.4 01/03/2022 11:10 AM    K 4.8 11/29/2021 02:20 PM    K 4.7 08/25/2021 04:30 PM     01/03/2022 11:10 AM     11/29/2021 02:20 PM     08/25/2021 04:30 PM    CO2 24 01/03/2022 11:10 AM    CO2 24 11/29/2021 02:20 PM    CO2 28 08/25/2021 04:30 PM    BUN 8 01/03/2022 11:10 AM    BUN 9 11/29/2021 02:20 PM    BUN 9 08/25/2021 04:30 PM    CREATININE 0.6 01/03/2022 11:10 AM    CREATININE 0.6 11/29/2021 02:20 PM    CREATININE 0.6 08/25/2021 04:30 PM    GLUCOSE 109 (H) 01/03/2022 11:10 AM    GLUCOSE 111 (H) 11/29/2021 02:20 PM    GLUCOSE 110 (H) 08/25/2021 04:30 PM    GLUCOSE 97 02/13/2012 10:20 AM    CALCIUM 9.7 01/03/2022 11:10 AM    CALCIUM 10.2 11/29/2021 02:20 PM    CALCIUM 9.5 08/25/2021 04:30 PM    PROT 7.3 01/03/2022 11:10 AM    PROT 6.8 11/29/2021 02:20 PM    PROT 7.0 08/25/2021 04:30 PM    LABALBU 4.2 01/03/2022 11:10 AM    LABALBU 4.1 11/29/2021 02:20 PM    LABALBU 4.1 08/25/2021 04:30 PM    LABALBU 4.5 02/13/2012 10:20 AM    BILITOT 0.2 01/03/2022 11:10 AM    BILITOT 0.3 11/29/2021 02:20 PM    BILITOT 0.3 08/25/2021 04:30 PM    ALKPHOS 165 (H) 01/03/2022 11:10 AM    ALKPHOS 142 (H) 11/29/2021 02:20 PM    ALKPHOS 124 (H) 08/25/2021 04:30 PM    AST 27 01/03/2022 11:10 AM    AST 20 11/29/2021 02:20 PM    AST 31 08/25/2021 04:30 PM    ALT 23 01/03/2022 11:10 AM    ALT 17 11/29/2021 02:20 PM    ALT 28 08/25/2021 04:30 PM        CBC:   Lab Results   Component Value Date/Time    WBC 3.7 (L) 01/03/2022 11:10 AM    RBC 5.73 (H) 01/03/2022 11:10 AM    HGB 15.6 (H) 01/03/2022 11:10 AM    HCT 49.6 (H) 01/03/2022 11:10 AM    MCV 86.6 01/03/2022 11:10 AM    MCH 27.2 01/03/2022 11:10 AM    MCHC 31.5 (L) 01/03/2022 11:10 AM    RDW 13.7 01/03/2022 11:10 AM     01/03/2022 11:10 AM    MPV 10.6 01/03/2022 11:10 AM       A1C:  Lab Results   Component Value Date/Time    LABA1C 5.6 11/29/2021 02:20 PM       Lipid panel:  Lab Results   Component Value Date    CHOL 207 07/12/2021    CHOL 173 11/11/2019    CHOL 370 02/15/2016    TRIG 335 07/12/2021    TRIG 237 11/11/2019    TRIG 566 02/15/2016    HDL 30 07/12/2021    HDL 36 11/11/2019    HDL 37 02/15/2016        Lab Results   Component Value Date/Time    PROT 7.3 01/03/2022 11:10 AM    PROT 6.8 11/29/2021 02:20 PM    PROT 7.0 08/25/2021 04:30 PM       No results found for: MG      Assessment. Flakita Ramires was seen today for other.     Diagnoses and all orders for this visit:    Hyperthyroidism    Chronic obstructive pulmonary disease, unspecified COPD type ([facility] [address]    Coronary artery disease involving native coronary artery of native heart without angina pectoris    Hyperlipidemia, unspecified hyperlipidemia type    Lumbar spondylosis    Peripheral polyneuropathy    Need for immunization against influenza  -     INFLUENZA, QUADV, ADJUVANTED, 72 YRS =, IM, PF, PREFILL SYR, 0.5ML (FLUAD)    Other decreased white blood cell (WBC) count       Patient Active Problem List   Diagnosis    Neuropathy    Hyperlipidemia    Coronary artery disease involving native coronary artery of native heart without angina pectoris    Generalized osteoarthritis    Weight loss    Diarrhea    Hyperthyroidism    Primary osteoarthritis of left knee    Chronic bilateral low back pain without sciatica    Chronic pain syndrome    Primary osteoarthritis of right hip    Lumbar disc disorder    Lumbar spondylosis    Lumbar facet arthropathy    Lumbar radiculopathy    Chronic obstructive pulmonary disease, unspecified COPD type ([facility] [address]       Plan: Hyperthyroidism T4 1.24, improved from 2.4,  T3 3.7 improved from 9.1  Patient clinically asymptomatic  TSH still suppressed less than 0.01  Advised to keep that appointment with endocrinology in 10 days    I made copies of all the records done by me to be taken to the endocrinologist    Mild neutropenia need to be monitored rule out side effect of the medication  Reduce the methimazole to 10 mg   1-1/2 tablet daily  Repeat CBC in 2 weeks she will discuss with the endocrinologist  All the labs copies given to the patient  Hemoglobin and platelet are normal    COPD stable clinically doing    History of CAD and stent doing well seen by cardiologist    Hyperlipidemia continue Lipitor 10 mg      Peripheral neuropathy continue gabapentin which is helping    Chronic back pain not being followed at pain clinic    Patient clinically feels a lot better  She was very thankful for the care received by me    I will be retiring she will see another physician next visit she has an appointment in March 2022        Return in about 2 months (around 3/5/2022).        Alejo Dial MD  2:38 PM  1/5/2022     [place]

## 2022-01-07 ENCOUNTER — OFFICE VISIT (OUTPATIENT)
Dept: FAMILY MEDICINE CLINIC | Age: 67
End: 2022-01-07
Payer: MEDICARE

## 2022-01-07 VITALS
DIASTOLIC BLOOD PRESSURE: 74 MMHG | OXYGEN SATURATION: 97 % | SYSTOLIC BLOOD PRESSURE: 144 MMHG | BODY MASS INDEX: 25.05 KG/M2 | TEMPERATURE: 97 F | HEIGHT: 70 IN | HEART RATE: 97 BPM | RESPIRATION RATE: 16 BRPM | WEIGHT: 175 LBS

## 2022-01-07 DIAGNOSIS — U07.1 COVID-19: ICD-10-CM

## 2022-01-07 DIAGNOSIS — R05.9 COUGH: Primary | ICD-10-CM

## 2022-01-07 LAB
Lab: ABNORMAL
PERFORMING INSTRUMENT: ABNORMAL
QC PASS/FAIL: ABNORMAL
SARS-COV-2, POC: DETECTED

## 2022-01-07 PROCEDURE — 1036F TOBACCO NON-USER: CPT | Performed by: NURSE PRACTITIONER

## 2022-01-07 PROCEDURE — 1090F PRES/ABSN URINE INCON ASSESS: CPT | Performed by: NURSE PRACTITIONER

## 2022-01-07 PROCEDURE — 3017F COLORECTAL CA SCREEN DOC REV: CPT | Performed by: NURSE PRACTITIONER

## 2022-01-07 PROCEDURE — 4040F PNEUMOC VAC/ADMIN/RCVD: CPT | Performed by: NURSE PRACTITIONER

## 2022-01-07 PROCEDURE — 87426 SARSCOV CORONAVIRUS AG IA: CPT | Performed by: NURSE PRACTITIONER

## 2022-01-07 PROCEDURE — G8427 DOCREV CUR MEDS BY ELIG CLIN: HCPCS | Performed by: NURSE PRACTITIONER

## 2022-01-07 PROCEDURE — G8417 CALC BMI ABV UP PARAM F/U: HCPCS | Performed by: NURSE PRACTITIONER

## 2022-01-07 PROCEDURE — 1123F ACP DISCUSS/DSCN MKR DOCD: CPT | Performed by: NURSE PRACTITIONER

## 2022-01-07 PROCEDURE — 99213 OFFICE O/P EST LOW 20 MIN: CPT | Performed by: NURSE PRACTITIONER

## 2022-01-07 PROCEDURE — G8399 PT W/DXA RESULTS DOCUMENT: HCPCS | Performed by: NURSE PRACTITIONER

## 2022-01-07 PROCEDURE — G8484 FLU IMMUNIZE NO ADMIN: HCPCS | Performed by: NURSE PRACTITIONER

## 2022-01-07 RX ORDER — DEXAMETHASONE 6 MG/1
6 TABLET ORAL
Qty: 5 TABLET | Refills: 0 | Status: SHIPPED | OUTPATIENT
Start: 2022-01-07 | End: 2022-01-12

## 2022-01-07 RX ORDER — BROMPHENIRAMINE MALEATE, PSEUDOEPHEDRINE HYDROCHLORIDE, AND DEXTROMETHORPHAN HYDROBROMIDE 2; 30; 10 MG/5ML; MG/5ML; MG/5ML
10 SYRUP ORAL 4 TIMES DAILY PRN
Qty: 120 ML | Refills: 0 | Status: SHIPPED
Start: 2022-01-07 | End: 2022-03-21 | Stop reason: ALTCHOICE

## 2022-01-07 RX ORDER — BENZONATATE 100 MG/1
100 CAPSULE ORAL 3 TIMES DAILY PRN
Qty: 21 CAPSULE | Refills: 0 | Status: SHIPPED | OUTPATIENT
Start: 2022-01-07 | End: 2022-01-14

## 2022-01-07 RX ORDER — DOXYCYCLINE HYCLATE 100 MG
100 TABLET ORAL 2 TIMES DAILY
Qty: 20 TABLET | Refills: 0 | Status: SHIPPED | OUTPATIENT
Start: 2022-01-07 | End: 2022-01-17

## 2022-01-07 NOTE — PROGRESS NOTES
Chief Complaint       Cough (x 2 days) and Chest Congestion    History of Present Illness   Source of history provided by:  patientTawnya Ibarra is a 74 y.o. old female presenting to the walk in clinic for evaluation of above symptoms, for x 2 days, diarhea. Denies any nausea CP, dyspnea, LE edema, abdominal pain, vomiting, rash, or lethargy. Denies hx of asthma. Hx COPD; former tobacco use. Patient reports recent sick exposures. Patient has not been vaccinated for COVID-19. Patient has been taking OTC for symptomatic relief. Able to eat & drink. ROS    Unless otherwise stated in this report or unable to obtain because of the patient's clinical or mental status as evidenced by the medical record, this patients's positive and negative responses for Review of Systems, constitutional, psych, eyes, ENT, cardiovascular, respiratory, gastrointestinal, neurological, genitourinary, musculoskeletal, integument systems and systems related to the presenting problem are either stated in the preceding or were not pertinent or were negative for the symptoms and/or complaints related to the medical problem. Past Medical History:  has a past medical history of Anxiety, CAD (coronary artery disease), Chronic back pain, COPD (chronic obstructive pulmonary disease) ([address] Depression, GERD (gastroesophageal reflux disease), Hyperlipidemia, Hypertension, Mitochondrial disease ([address] and Neuropathy. Past Surgical History:  has a past surgical history that includes Cholecystectomy; Tonsillectomy; Neck surgery; Coronary angioplasty with stent (2014); Hysterectomy; partial hysterectomy (cervix not removed); Appendectomy; and Abdominal exploration surgery. Social History:  reports that she quit smoking about 11 years ago. She has a 27.00 pack-year smoking history. She has never used smokeless tobacco. She reports that she does not drink alcohol and does not use drugs.   Family History: family history includes Colon Cancer (age of onset: 80) in her father; Early Death in her mother; Heart Disease in her mother. Allergies: Iodine and Evista [raloxifene hcl]    Physical Exam         VS:  BP (!) 144/74   Pulse 97   Temp 97 °F (36.1 °C)   Resp 16   Ht 5' 10\" (1.778 m)   Wt 175 lb (79.4 kg)   SpO2 97%   BMI 25.11 kg/m²    Oxygen Saturation Interpretation: Normal.    Constitutional:  Alert, development consistent with age. NAD. Head:  NC/NT. Airway patent. Cerumen noted. Mouth: Posterior pharynx with mild erythema and clear postnasal drip. No tonsillar hypertrophy or exudate. Neck:  Normal ROM. Supple. No anterior cervical adenopathy noted. Lungs: CTAB without wheezes, rales, or rhonchi. CV:  Regular rate and rhythm, normal heart sounds, without pathological murmurs, ectopy, gallops, or rubs. Skin:  Normal turgor. Warm, dry, without visible rash. Lymphatic: No lymphangitis or adenopathy noted. Neurological:  Oriented. Motor functions intact. Lab / Imaging Results   (All laboratory and radiology results have been personally reviewed by myself)  Labs:  No results found for this visit on 01/07/22. Imaging: All Radiology results interpreted by Radiologist unless otherwise noted. No results found for this visit on 01/07/22. Assessment / Plan     Impression(s):  Silvia Coffey was seen today for cough and chest congestion. Diagnoses and all orders for this visit:    Cough  -     POCT COVID-19, Antigen (+) in office today    COVID-19  -     doxycycline hyclate (VIBRA-TABS) 100 MG tablet; Take 1 tablet by mouth 2 times daily for 10 days  -     dexamethasone (DECADRON) 6 MG tablet; Take 1 tablet by mouth daily (with breakfast) for 5 days  -     benzonatate (TESSALON PERLES) 100 MG capsule; Take 1 capsule by mouth 3 times daily as needed for Cough  -     brompheniramine-pseudoephedrine-DM (BROMFED DM) 2-30-10 MG/5ML syrup;  Take 10 mLs by mouth 4 times daily as needed for Congestion or Cough    - Red Flag items & conservative methods discussed including OTC methods & Coricidin medication  - F/u with PCP if symptoms persist  - Work/school letter provided in AVS if requested    Disposition:  Disposition: Discharge to home. Advised cautionary self-quarantine at home in the interim. Increase fluids and rest. Symptomatic relief discussed including Tylenol prn pain/fever. Schedule virtual f/u with PCP in 7-10 days if symptoms persist. ED sooner if symptoms worsen or change. ED immediately with high or refractory fever, progressive SOB, dyspnea, CP, calf pain/swelling, shaking chills, vomiting, abdominal pain, lethargy, flank pain, or decreased urinary output. Pt verbalizes understanding and is in agreement with plan of care. All questions answered. Greyson Najera, APRN - CNP    **This report was transcribed using voice recognition software. Every effort was made to ensure accuracy; however, inadvertent computerized transcription errors may be present.

## 2022-01-11 RX ORDER — METHIMAZOLE 10 MG/1
10 TABLET ORAL 2 TIMES DAILY
Qty: 60 TABLET | Refills: 0 | Status: SHIPPED | OUTPATIENT
Start: 2022-01-11 | End: 2022-06-20 | Stop reason: SDUPTHER

## 2022-01-19 ENCOUNTER — OFFICE VISIT (OUTPATIENT)
Dept: PAIN MANAGEMENT | Age: 67
End: 2022-01-19
Payer: MEDICARE

## 2022-01-19 VITALS
RESPIRATION RATE: 16 BRPM | OXYGEN SATURATION: 96 % | HEART RATE: 72 BPM | DIASTOLIC BLOOD PRESSURE: 68 MMHG | SYSTOLIC BLOOD PRESSURE: 118 MMHG | WEIGHT: 180 LBS | TEMPERATURE: 96.2 F | BODY MASS INDEX: 25.2 KG/M2 | HEIGHT: 71 IN

## 2022-01-19 DIAGNOSIS — M54.16 LUMBAR RADICULOPATHY: ICD-10-CM

## 2022-01-19 DIAGNOSIS — G89.4 CHRONIC PAIN SYNDROME: ICD-10-CM

## 2022-01-19 DIAGNOSIS — M47.816 LUMBAR SPONDYLOSIS: ICD-10-CM

## 2022-01-19 DIAGNOSIS — M51.9 LUMBAR DISC DISORDER: ICD-10-CM

## 2022-01-19 DIAGNOSIS — M47.816 LUMBAR FACET ARTHROPATHY: ICD-10-CM

## 2022-01-19 DIAGNOSIS — M16.11 PRIMARY OSTEOARTHRITIS OF RIGHT HIP: Primary | ICD-10-CM

## 2022-01-19 PROCEDURE — G8399 PT W/DXA RESULTS DOCUMENT: HCPCS | Performed by: PAIN MEDICINE

## 2022-01-19 PROCEDURE — 1123F ACP DISCUSS/DSCN MKR DOCD: CPT | Performed by: PAIN MEDICINE

## 2022-01-19 PROCEDURE — 1036F TOBACCO NON-USER: CPT | Performed by: PAIN MEDICINE

## 2022-01-19 PROCEDURE — 99214 OFFICE O/P EST MOD 30 MIN: CPT | Performed by: PAIN MEDICINE

## 2022-01-19 PROCEDURE — G8417 CALC BMI ABV UP PARAM F/U: HCPCS | Performed by: PAIN MEDICINE

## 2022-01-19 PROCEDURE — G8484 FLU IMMUNIZE NO ADMIN: HCPCS | Performed by: PAIN MEDICINE

## 2022-01-19 PROCEDURE — 3017F COLORECTAL CA SCREEN DOC REV: CPT | Performed by: PAIN MEDICINE

## 2022-01-19 PROCEDURE — 4040F PNEUMOC VAC/ADMIN/RCVD: CPT | Performed by: PAIN MEDICINE

## 2022-01-19 PROCEDURE — G8427 DOCREV CUR MEDS BY ELIG CLIN: HCPCS | Performed by: PAIN MEDICINE

## 2022-01-19 PROCEDURE — 99213 OFFICE O/P EST LOW 20 MIN: CPT

## 2022-01-19 PROCEDURE — 1090F PRES/ABSN URINE INCON ASSESS: CPT | Performed by: PAIN MEDICINE

## 2022-01-19 NOTE — PROGRESS NOTES
[address]  [address] [facility], [address] [place] [address] Jordy  [phone]    Follow up Note      Bambi Koroma     Date of Visit:  1/19/2022    CC:  Patient presents for follow up   Chief Complaint   Patient presents with    Follow-up    Back Pain     down right leg to thigh     HPI:    Pain is unchanged. Appropriate analgesia with current medications regimen:Fair  Change in quality of symptoms:no. Medication side effects:none. Recent diagnostic testing:Lumbar spine MRI/right hip xray. Recent interventional procedures:none. .    She has been on anticoagulation medications to include Plavix/ASA. The patient  has not been on herbal supplements. The patient is not diabetic. Imaging:   Lumbar spine MRI 2021  . Advanced degenerative change with slight grade 1 retrolisthesis at L2-3   and slight grade 1 anterolisthesis at L4-5.  Mild levoscoliosis. 2. Moderate central canal stenosis at L3-4 with right-sided disc protrusion   causing mild right subarticular recess stenosis and moderate right neural   foraminal stenosis. 3. Mild to moderate central canal stenosis at L2-3 with small right-sided   disc herniation causing mild right subarticular recess and neural foraminal   stenosis. 4. Small left-sided disc protrusion at L4-5 causing mild left subarticular   recess stenosis and neural foraminal stenosis.  No significant central canal   stenosis at this level. 5. Subchondral signal change about L2-3 with some some edema most likely   related to severe degenerative disc disease.  Clinical correlation for signs   of infection is suggested. Right hip Xray 2021:  Mild-to-moderate osteoarthritis at the right hip. Lumbar spine Xray 2016:  1. Left convex scoliosis. 2. Multilevel degenerative spondylosis throughout the lumbar spine. 3. Facet arthritis from L2-S1.   4. No acute abnormality is identified. Previous treatments: Physical Therapy and medications. Jose Hernandez Potential Aberrant Drug-Related Behavior:   No     Urine Drug Screening:  None    OARRS report:  01/2022 consistent    Opioid Agreement:  Date enacted:N/A  Renewal date:N/A    Past Medical History:   Diagnosis Date    Anxiety     CAD (coronary artery disease)     Chronic back pain     COPD (chronic obstructive pulmonary disease) (HCC)     Depression     GERD (gastroesophageal reflux disease)     Hyperlipidemia     Hypertension     Mitochondrial disease ([address]     Neuropathy      Past Surgical History:   Procedure Laterality Date    ABDOMINAL EXPLORATION SURGERY      APPENDECTOMY      CHOLECYSTECTOMY      CORONARY ANGIOPLASTY WITH STENT PLACEMENT  2014    HYSTERECTOMY      NECK SURGERY      PARTIAL HYSTERECTOMY      TONSILLECTOMY       Prior to Admission medications    Medication Sig Start Date End Date Taking? Authorizing Provider   methIMAzole (TAPAZOLE) 10 MG tablet Take 1 tablet by mouth 2 times daily 1/11/22  Yes Zacarias Mack MD   brompheniramine-pseudoephedrine-DM (BROMFED DM) 2-30-10 MG/5ML syrup Take 10 mLs by mouth 4 times daily as needed for Congestion or Cough 1/7/22  Yes Mehul Ambriz APRN - CNP   atorvastatin (LIPITOR) 10 MG tablet Take 1 tablet by mouth daily 12/2/21  Yes Zacarias Mack MD   celecoxib (CELEBREX) 200 MG capsule Take 1 capsule by mouth daily 12/2/21  Yes Zacarias Mack MD   clopidogrel (PLAVIX) 75 MG tablet Take 1 tablet by mouth daily 12/2/21  Yes Zacarias Mack MD   cyclobenzaprine (FLEXERIL) 10 MG tablet Take 1 tablet by mouth daily 12/2/21  Yes Zacarias Mack MD   DULoxetine (CYMBALTA) 60 MG extended release capsule Take 1 capsule by mouth daily 12/2/21  Yes Zacarias Mack MD   gabapentin (NEURONTIN) 300 MG capsule Take 1 capsule by mouth 3 times daily for 90 days.  12/2/21 3/2/22 Yes Zacarias Mack MD   metoprolol succinate (TOPROL XL) 50 MG extended release tablet Take 1 tablet by mouth 2 times daily 12/2/21  Yes Oskar Yahir Olsen MD   Ji [address] Until 2026  Yes Joyce Moser MD   aspirin 81 MG tablet Take 81 mg by mouth daily   Yes Historical Provider, MD     Allergies   Allergen Reactions    Iodine Anaphylaxis and Hives    Evista [Raloxifene Hcl]      Social History     Socioeconomic History    Marital status:      Spouse name: Not on file    Number of children: Not on file    Years of education: Not on file    Highest education level: Not on file   Occupational History    Not on file   Tobacco Use    Smoking status: Former Smoker     Packs/day: 1.00     Years: 27.00     Pack years: 27.00     Quit date:      Years since quittin.0    Smokeless tobacco: Never Used   Substance and Sexual Activity    Alcohol use: No    Drug use: No    Sexual activity: Not on file   Other Topics Concern    Not on file   Social History Narrative    Not on file     Social Determinants of Health     Financial Resource Strain:     Difficulty of Paying Living Expenses: Not on file   Food Insecurity: No Food Insecurity    Worried About [address] [place] [address] in the Last Year: Never true    Sadie of Food in the Last Year: Never true   Transportation Needs: No Transportation Needs    Lack of Transportation (Medical): No    Lack of Transportation (Non-Medical):  No   Physical Activity:     Days of Exercise per Week: Not on file    Minutes of Exercise per Session: Not on file   Stress:     Feeling of Stress : Not on file   Social Connections:     Frequency of Communication with Friends and Family: Not on file    Frequency of Social Gatherings with Friends and Family: Not on file    Attends Religion Services: Not on file    Active Member of Clubs or Organizations: Not on file    Attends Club or Organization Meetings: Not on file    Marital Status: Not on file   Intimate Partner Violence:     Fear of Current or Ex-Partner: Not on file    Emotionally Abused: Not on file    Physically Abused: Not on file    Sexually Abused: Not on file   Housing Stability:     Unable to Pay for Housing in the Last Year: Not on file    Number of Places Lived in the Last Year: Not on file    Unstable Housing in the Last Year: Not on file     Family History   Problem Relation Age of Onset    Early Death Mother     Heart Disease Mother     Colon Cancer Father 80     REVIEW OF SYSTEMS:     Marcio Alonzo denies fever/chills, chest pain, shortness of breath, new bowel or bladder complaints. All other review of systems was negative. PHYSICAL EXAMINATION:      /68   Pulse 72   Temp 96.2 °F (35.7 °C) (Infrared)   Resp 16   Ht 5' 11\" (1.803 m)   Wt 180 lb (81.6 kg)   SpO2 96%   BMI 25.10 kg/m²     General:       General appearance:   pleasant and well-hydrated. , in moderate discomfort and A & O x3  Build:Normal Weight     HEENT:     Head:normocephalic and atraumatic  Sclera: icterus absent,      Lungs:     Breathing:Breathing Pattern: regular, no distress     Abdomen:     Shape:non-distended and normal  Tenderness:none     Cervical spine:     Inspection:anterior fusion scar      Lumbar spine:     Spine inspection:scoliosis   CVA tenderness:No   Palpation:tenderness paravertebral muscles, facet loading, left, right, positive and tenderness.   Range of motion:abnormal moderately Lateral bending, flexion, extension rotation bilateral and is  painful.     Musculoskeletal:     Trigger points in Paraveteral:absent bilaterally  SI joint tenderness:positive right, positive left              STEPHANIE test:negative right, negative             left  Piriformis tenderness:negative right, negative left  Trochanteric bursa tenderness:positive right, positive left  SLR:negative right, negative left, sitting      Extremities:     Tremors:None bilaterally upper and lower  Range of motion:pain with internal rotation of hips positive right  Intact:Yes  Edema:Normal     Neurological:     Sensory:normal to light touch bilateral lower extremities. Motor:                            Right Quadriceps5-/5          Left Quadriceps5-/5           Right Gastrocnemius5-/5    Left Gastrocnemius5-/5  Right Ant Tibialis5-/5  Left Ant Tibialis5-/5  Reflexes:    Right Quadriceps reflex2+  Left Quadriceps reflex2+  Right Achilles reflex2+  Left Achilles reflex2+  Gait:antalgic     Dermatology:     Skin:no unusual rashes and no skin lesions     Impression:  Low back pain with radiation to the right thigh and groin. Plan:  Follow up on her low back and right hip pain with no acute issues. Results of lumbar spine MRI and right hip xray were discussed with the patient. Discussed treatment options with the patient including LESI L4-5 with 80 depo patient agrees. Currently on Norco 7.5 QD PRN, will take over. OARRS report reviewed 01/2022. Patient encouraged to stay active and to lose weight. Treatment plan discussed with the patient including medications and procedure side effects. We discussed with the patient that combining opioids, benzodiazepines, alcohol, illicit drugs or sleep aids increases the risk of respiratory depression including death. We discussed that these medications may cause drowsiness, sedation or dizziness and have counseled the patient not to drive or operate machinery. We have discussed that these medications will be prescribed only by one provider. We have discussed with the patient about age related risk factors and have thoroughly discussed the importance of taking these medications as prescribed. The patient verbalizes understanding. ccrefcathying garrett Deal M.D.

## 2022-01-19 NOTE — PROGRESS NOTES
Do you currently have any of the following:    Fever: No  Headache:  No  Cough: No  Shortness of breath: No  Exposed to anyone with these symptoms: Melania Chester presents to the [facility] on 1/19/2022. Tod Mccollum is complaining of pain lower back and down right leg to thigh. The pain is intermittent. The pain is described as sharp, tender, unbearable and pain. Pain is rated on her best day at a 4, on her worst day at a 10, and on average at a 5 on the VAS scale. She took her last dose of Neurontin, Motrin and flexeril,  lorcet plus. Tod Mccollum does not have issues with constipation. Any procedures since your last visit: No,     She is not on NSAIDS and  is  on anticoagulation medications to include ASA and Plavix . Dr Be Castillo    Pacemaker or defibrillator: No      Medication Contract and Consent for Opioid Use Documents Filed      No documents found                   /68   Pulse 72   Temp 96.2 °F (35.7 °C) (Infrared)   Resp 16   Ht 5' 11\" (1.803 m)   Wt 180 lb (81.6 kg)   SpO2 96%   BMI 25.10 kg/m²      No LMP recorded. Patient has had a hysterectomy.

## 2022-02-07 ENCOUNTER — HOSPITAL ENCOUNTER (OUTPATIENT)
Age: 67
Setting detail: OUTPATIENT SURGERY
Discharge: HOME | End: 2022-02-07
Attending: PAIN MEDICINE | Admitting: PAIN MEDICINE
Payer: MEDICARE

## 2022-02-07 ENCOUNTER — HOSPITAL ENCOUNTER (OUTPATIENT)
Dept: OPERATING ROOM | Age: 67
Setting detail: OUTPATIENT SURGERY
Discharge: HOME | End: 2022-02-07
Attending: PAIN MEDICINE
Payer: MEDICARE

## 2022-02-07 VITALS
SYSTOLIC BLOOD PRESSURE: 145 MMHG | OXYGEN SATURATION: 94 % | HEIGHT: 71 IN | DIASTOLIC BLOOD PRESSURE: 68 MMHG | BODY MASS INDEX: 25.2 KG/M2 | TEMPERATURE: 98 F | RESPIRATION RATE: 16 BRPM | HEART RATE: 84 BPM | WEIGHT: 180 LBS

## 2022-02-07 DIAGNOSIS — M54.16 LUMBAR RADICULOPATHY: ICD-10-CM

## 2022-02-07 PROCEDURE — 6360000002 HC RX W HCPCS: Performed by: PAIN MEDICINE

## 2022-02-07 PROCEDURE — 7100000010 HC PHASE II RECOVERY - FIRST 15 MIN: Performed by: PAIN MEDICINE

## 2022-02-07 PROCEDURE — 7100000011 HC PHASE II RECOVERY - ADDTL 15 MIN: Performed by: PAIN MEDICINE

## 2022-02-07 PROCEDURE — 3600000005 HC SURGERY LEVEL 5 BASE: Performed by: PAIN MEDICINE

## 2022-02-07 PROCEDURE — 2500000003 HC RX 250 WO HCPCS: Performed by: PAIN MEDICINE

## 2022-02-07 PROCEDURE — 6360000004 HC RX CONTRAST MEDICATION: Performed by: PAIN MEDICINE

## 2022-02-07 PROCEDURE — 62323 NJX INTERLAMINAR LMBR/SAC: CPT | Performed by: PAIN MEDICINE

## 2022-02-07 PROCEDURE — 3209999900 FLUORO FOR SURGICAL PROCEDURES

## 2022-02-07 PROCEDURE — 2709999900 HC NON-CHARGEABLE SUPPLY: Performed by: PAIN MEDICINE

## 2022-02-07 PROCEDURE — A9579 GAD-BASE MR CONTRAST NOS,1ML: HCPCS | Performed by: PAIN MEDICINE

## 2022-02-07 RX ORDER — LIDOCAINE HYDROCHLORIDE 5 MG/ML
INJECTION, SOLUTION INFILTRATION; INTRAVENOUS PRN
Status: DISCONTINUED | OUTPATIENT
Start: 2022-02-07 | End: 2022-02-07 | Stop reason: ALTCHOICE

## 2022-02-07 ASSESSMENT — PAIN DESCRIPTION - DESCRIPTORS: DESCRIPTORS: ACHING;NAGGING

## 2022-02-07 ASSESSMENT — PAIN - FUNCTIONAL ASSESSMENT
PAIN_FUNCTIONAL_ASSESSMENT: PREVENTS OR INTERFERES SOME ACTIVE ACTIVITIES AND ADLS
PAIN_FUNCTIONAL_ASSESSMENT: 0-10

## 2022-02-07 ASSESSMENT — PAIN SCALES - GENERAL
PAINLEVEL_OUTOF10: 0
PAINLEVEL_OUTOF10: 0

## 2022-02-07 NOTE — H&P
[address]  [address] [facility], [address] [place] [address] Joryd  [phone]    Procedure History & Physical      Gerber Black     HPI:    Patient  is here for low back and leg pain for LESI L3-4  Labs/imaging studies reviewed   All question and concerns addressed including R/B/A associated with the procedure    Past Medical History:   Diagnosis Date    Anxiety     CAD (coronary artery disease)     Chronic back pain     [facility] obstructive [facility] disease) [facility]     Depression     GERD (gastroesophageal reflux disease)     Hyperlipidemia     Hypertension     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 Authorizing Provider   methIMAzole (TAPAZOLE) 10 MG tablet Take 1 tablet by mouth 2 times daily 1/11/22  Yes Jason Ward MD   atorvastatin (LIPITOR) 10 MG tablet Take 1 tablet by mouth daily 12/2/21  Yes Jason Ward MD   celecoxib (CELEBREX) 200 MG capsule Take 1 capsule by mouth daily 12/2/21  Yes Jason Ward MD   clopidogrel (PLAVIX) 75 MG tablet Take 1 tablet by mouth daily 12/2/21  Yes Jason Ward MD   cyclobenzaprine (FLEXERIL) 10 MG tablet Take 1 tablet by mouth daily 12/2/21  Yes Jason Ward MD   DULoxetine (CYMBALTA) 60 MG extended release capsule Take 1 capsule by mouth daily 12/2/21  Yes Jason Ward MD   gabapentin (NEURONTIN) 300 MG capsule Take 1 capsule by mouth 3 times daily for 90 days.  12/2/21 3/2/22 Yes Jason Ward MD   metoprolol succinate (TOPROL XL) 50 MG extended release tablet Take 1 tablet by mouth 2 times daily 12/2/21  Yes Jason Ward MD   aspirin 81 MG tablet Take 81 mg by mouth daily   Yes Historical Provider, MD   brompheniramine-pseudoephedrine-DM (BROMFED DM) 2-30-10 MG/5ML syrup Take 10 mLs by mouth 4 times daily as needed for Congestion or Cough 22   Mehul Ambriz, APRN - CNP   Handicap Placard MISC Until 2026   Zacarias Mack MD       Allergies   Allergen Reactions    Iodine Anaphylaxis and Hives    Evista [Raloxifene Hcl] Hives       Social History     Socioeconomic History    Marital status:      Spouse name: Not on file    Number of children: Not on file    Years of education: Not on file    Highest education level: Not on file   Occupational History    Not on file   Tobacco Use    Smoking status: Former Smoker     Packs/day: 1.00     Years: 27.00     Pack years: 27.00     Quit date:      Years since quittin.1    Smokeless tobacco: Never Used   Substance and Sexual Activity    Alcohol use: No    Drug use: No    Sexual activity: Not on file   Other Topics Concern    Not on file   Social History Narrative    Not on file     Social Determinants of Health     Financial Resource Strain:     Difficulty of Paying Living Expenses: Not on file   Food Insecurity: No Food Insecurity    Worried About [address] [place] [address] in the Last Year: Never true    Sadie of Food in the Last Year: Never true   Transportation Needs: No Transportation Needs    Lack of Transportation (Medical): No    Lack of Transportation (Non-Medical):  No   Physical Activity:     Days of Exercise per Week: Not on file    Minutes of Exercise per Session: Not on file   Stress:     Feeling of Stress : Not on file   Social Connections:     Frequency of Communication with Friends and Family: Not on file    Frequency of Social Gatherings with Friends and Family: Not on file    Attends Congregational Services: Not on file    Active Member of Clubs or Organizations: Not on file    Attends Club or Organization Meetings: Not on file    Marital Status: Not on file   Intimate Partner Violence:     Fear of Current or Ex-Partner: Not on file    Emotionally Abused: Not on file    Physically Abused: Not on file    Sexually Abused: Not on file   Housing Stability:     Unable to Pay for Housing in the Last Year: Not on file    Number of [place] in the Last Year: Not on file    Unstable Housing in the Last Year: Not on file       Family History   Problem Relation Age of Onset    Early Death Mother     Heart Disease Mother     Colon Cancer Father 80         REVIEW OF SYSTEMS:    CONSTITUTIONAL:  negative for  fevers, chills, sweats and fatigue    RESPIRATORY:  negative for  dry cough, cough with sputum, dyspnea, wheezing and chest pain    CARDIOVASCULAR:  negative for chest pain, dyspnea, palpitations, syncope    GASTROINTESTINAL:  negative for nausea, vomiting, change in bowel habits, diarrhea, constipation and abdominal pain    MUSCULOSKELETAL: negative for muscle weakness    SKIN: negative for itching or rashes. BEHAVIOR/PSYCH:  negative for poor appetite, increased appetite, decreased sleep and poor concentration    All other systems negative      PHYSICAL EXAM:    VITALS:  BP (!) 149/79   Pulse 79   Temp 98 °F (36.7 °C) (Temporal)   Resp 16   Ht 5' 11\" (1.803 m)   Wt 180 lb (81.6 kg)   SpO2 97%   BMI 25.10 kg/m²     CONSTITUTIONAL:  awake, alert, cooperative, no apparent distress, and appears stated age    EYES: PERRLA, EOMI    LUNGS:  No increased work of breathing, no audible wheezing    CARDIOVASCULAR:  regular rate and rhythm    ABDOMEN:  Soft non tender non distended     EXTREMITIES: no signs of clubbing or cyanosis. MUSCULOSKELETAL: negative for flaccid muscle tone or spastic movements. SKIN: gross examination reveals no signs of rashes, or diaphoresis. NEURO: Cranial nerves II-XII grossly intact. No signs of agitated mood. Assessment/Plan:    Low back and leg pain for LESI L3-4.

## 2022-02-07 NOTE — OP NOTE
2022    Patient: Erin Allen  :  1955  Age:  74 y.o. Sex:  female     PRE-OPERATIVE DIAGNOSIS: Lumbar disc displacement, lumbar radiculopathy. POST-OPERATIVE DIAGNOSIS: Same. PROCEDURE: Fluoroscopic guided therapeutic lumbar epidural steroid injection at the L3-4 level (# 1). SURGEON: RADHA Bailey M.D.. ANESTHESIA: Local    ESTIMATED BLOOD LOSS: None.  ______________________________________________________________________    BRIEF HISTORY:  Erin Allen comes in today for first lumbar epidural injection at L3-4 level. The potential complications of this procedure were discussed with her again today. She has elected to undergo the aforementioned procedure. [facility] complete History & Physical examination were reviewed in depth, a copy of which is in the chart. DESCRIPTION OF PROCEDURE:    After confirming written and informed consent, a time-out was performed and [facility] name and date of birth, the procedure to be performed as well as the plan for the location of the needle insertion were confirmed. The patient was brought into the procedure room and placed in the prone position on the fluoroscopy table. A pillow was placed under the patient's lower abdomen/upper pelvis to increase lumbar interlaminar space. Standard monitors were placed, and vital signs were observed throughout the procedure. The area of the lumbar spine was prepped with chloraprep and draped in a sterile manner. The L3-4 interspace was identified and marked under AP fluoroscopy. The skin and subcutaneous tissues at the above level were anesthestized with 0.5% lidocaine. With intermittent fluoroscopy, an # 18 gauge 3 1/2 tuohy epidural needle was inserted and directed toward the interlaminar space. The needle was slowly advanced using loss of resistance technique and 5 cc glass syringe  until the tip of the epidural needle has passed through the ligamentum flavum and entered the epidural space.  AP and lateral fluoroscopic imaging is performed to verify that the epidural needle is properly placed. Negative aspiration of blood and CSF was confirmed. 0.5 ml of omnipaque 240 was used for confirmation of even epidural spread under both live and AP fluoroscopy. After negative aspiration, a solution of 0.5 % Lidocaine 2 ml and 80 mg DepoMedrol was easily injected. The needle was gently removed intact . The patient back was cleaned and a Band-Aid was placed over the needle insertion point. Disposition the patient tolerated the procedure well and there were no complications . Vital signs remained stable throughout the procedure. The patient was escorted to the recovery area where they remained until discharge and written discharge instructions for the procedure were given. Plan: Tg Remy will return to our pain management center as scheduled.      Lobito Zamora MD

## 2022-02-23 ENCOUNTER — OFFICE VISIT (OUTPATIENT)
Dept: PAIN MANAGEMENT | Age: 67
End: 2022-02-23
Payer: MEDICARE

## 2022-02-23 VITALS
DIASTOLIC BLOOD PRESSURE: 80 MMHG | RESPIRATION RATE: 16 BRPM | TEMPERATURE: 97.1 F | HEIGHT: 71 IN | WEIGHT: 180 LBS | OXYGEN SATURATION: 97 % | HEART RATE: 95 BPM | SYSTOLIC BLOOD PRESSURE: 136 MMHG | BODY MASS INDEX: 25.2 KG/M2

## 2022-02-23 DIAGNOSIS — M47.816 LUMBAR SPONDYLOSIS: ICD-10-CM

## 2022-02-23 DIAGNOSIS — G89.4 CHRONIC PAIN SYNDROME: ICD-10-CM

## 2022-02-23 DIAGNOSIS — M54.16 LUMBAR RADICULOPATHY: Primary | ICD-10-CM

## 2022-02-23 DIAGNOSIS — M16.11 PRIMARY OSTEOARTHRITIS OF RIGHT HIP: ICD-10-CM

## 2022-02-23 DIAGNOSIS — M54.16 LUMBAR RADICULOPATHY: ICD-10-CM

## 2022-02-23 PROCEDURE — 3017F COLORECTAL CA SCREEN DOC REV: CPT | Performed by: PAIN MEDICINE

## 2022-02-23 PROCEDURE — 1123F ACP DISCUSS/DSCN MKR DOCD: CPT | Performed by: PAIN MEDICINE

## 2022-02-23 PROCEDURE — 99213 OFFICE O/P EST LOW 20 MIN: CPT | Performed by: PAIN MEDICINE

## 2022-02-23 PROCEDURE — G8417 CALC BMI ABV UP PARAM F/U: HCPCS | Performed by: PAIN MEDICINE

## 2022-02-23 PROCEDURE — G8484 FLU IMMUNIZE NO ADMIN: HCPCS | Performed by: PAIN MEDICINE

## 2022-02-23 PROCEDURE — 1090F PRES/ABSN URINE INCON ASSESS: CPT | Performed by: PAIN MEDICINE

## 2022-02-23 PROCEDURE — 1036F TOBACCO NON-USER: CPT | Performed by: PAIN MEDICINE

## 2022-02-23 PROCEDURE — 4040F PNEUMOC VAC/ADMIN/RCVD: CPT | Performed by: PAIN MEDICINE

## 2022-02-23 PROCEDURE — G8399 PT W/DXA RESULTS DOCUMENT: HCPCS | Performed by: PAIN MEDICINE

## 2022-02-23 PROCEDURE — G8427 DOCREV CUR MEDS BY ELIG CLIN: HCPCS | Performed by: PAIN MEDICINE

## 2022-02-23 RX ORDER — HYDROCODONE BITARTRATE AND ACETAMINOPHEN 7.5; 325 MG/1; MG/1
1 TABLET ORAL DAILY PRN
Qty: 30 TABLET | Refills: 0 | Status: SHIPPED
Start: 2022-02-23 | End: 2022-03-23 | Stop reason: SDUPTHER

## 2022-02-23 NOTE — PROGRESS NOTES
[address]  [address] [facility], [address] [place] [address]  [phone]    Follow up Note      Sid Evans     Date of Visit:  2/23/2022    CC:  Patient presents for follow up   Chief Complaint   Patient presents with    Follow Up After Procedure     Fluoroscopic guided therapeutic lumbar epidural steroid injection at the L3-4 level (# 1). HPI:    Pain is unchanged. Appropriate analgesia with current medications regimen:Fair  Change in quality of symptoms:no. Medication side effects:none. Recent diagnostic testing:none  Recent interventional procedures:LESI L3-4 with less than expected response    She has been on anticoagulation medications to include Plavix/ASA. The patient  has not been on herbal supplements. The patient is not diabetic. Imaging:   Lumbar spine MRI 2021  . Advanced degenerative change with slight grade 1 retrolisthesis at L2-3   and slight grade 1 anterolisthesis at L4-5.  Mild levoscoliosis. 2. Moderate central canal stenosis at L3-4 with right-sided disc protrusion   causing mild right subarticular recess stenosis and moderate right neural   foraminal stenosis. 3. Mild to moderate central canal stenosis at L2-3 with small right-sided   disc herniation causing mild right subarticular recess and neural foraminal   stenosis. 4. Small left-sided disc protrusion at L4-5 causing mild left subarticular   recess stenosis and neural foraminal stenosis.  No significant central canal   stenosis at this level. 5. Subchondral signal change about L2-3 with some some edema most likely   related to severe degenerative disc disease.  Clinical correlation for signs   of infection is suggested. Right hip Xray 2021:  Mild-to-moderate osteoarthritis at the right hip. Lumbar spine Xray 2016:  1. Left convex scoliosis. 2. Multilevel degenerative spondylosis throughout the lumbar spine.    3. Facet arthritis from L2-S1.   4. No acute abnormality is identified. Previous treatments: Physical Therapy and medications. .       Potential Aberrant Drug-Related Behavior:   No     Urine Drug Screening:  None    OARRS report:  01/2022 consistent    Opioid Agreement:  Date enacted:N/A  Renewal date:N/A    Past Medical History:   Diagnosis Date    Anxiety     CAD (coronary artery disease)     Chronic back pain     COPD (chronic obstructive pulmonary disease) (HCC)     Depression     GERD (gastroesophageal reflux disease)     Hyperlipidemia     Hypertension     Mitochondrial disease ([address]     Neuropathy      Past Surgical History:   Procedure Laterality Date    ABDOMINAL EXPLORATION SURGERY      APPENDECTOMY      CHOLECYSTECTOMY      CORONARY ANGIOPLASTY WITH STENT PLACEMENT  2014    HYSTERECTOMY      NECK SURGERY      PAIN MANAGEMENT PROCEDURE N/A 2/7/2022    LUMBAR EPIDURAL STEROID INJECTION L3-4 WITH 80 DEPO performed by Iris Day MD at [facility] [place]       Prior to Admission medications    Medication Sig Start Date End Date Taking?  Authorizing Provider   methIMAzole (TAPAZOLE) 10 MG tablet Take 1 tablet by mouth 2 times daily 1/11/22  Yes Meghann Torres MD   brompheniramine-pseudoephedrine-DM (BROMFED DM) 2-30-10 MG/5ML syrup Take 10 mLs by mouth 4 times daily as needed for Congestion or Cough 1/7/22  Yes Osmar Wisdom APRN - CNP   atorvastatin (LIPITOR) 10 MG tablet Take 1 tablet by mouth daily 12/2/21  Yes Meghann Torres MD   celecoxib (CELEBREX) 200 MG capsule Take 1 capsule by mouth daily 12/2/21  Yes Meghann Torres MD   clopidogrel (PLAVIX) 75 MG tablet Take 1 tablet by mouth daily 12/2/21  Yes Meghann Torres MD   cyclobenzaprine (FLEXERIL) 10 MG tablet Take 1 tablet by mouth daily 12/2/21  Yes Meghann Torres MD   DULoxetine (CYMBALTA) 60 MG extended release capsule Take 1 capsule by mouth daily 12/2/21  Yes Meghann Torres MD   gabapentin (NEURONTIN) 300 MG Club or Organization Meetings: Not on file    Marital Status: Not on file   Intimate Partner Violence:     Fear of Current or Ex-Partner: Not on file    Emotionally Abused: Not on file    Physically Abused: Not on file    Sexually Abused: Not on file   Housing Stability:     Unable to Pay for Housing in the Last Year: Not on file    Number of [place] in the Last Year: Not on file    Unstable Housing in the Last Year: Not on file     Family History   Problem Relation Age of Onset    Early Death Mother     Heart Disease Mother     Colon Cancer Father 80     REVIEW OF SYSTEMS:     Isidro Bermudez denies fever/chills, chest pain, shortness of breath, new bowel or bladder complaints. All other review of systems was negative. PHYSICAL EXAMINATION:      /80   Pulse 95   Temp 97.1 °F (36.2 °C) (Infrared)   Resp 16   Ht 5' 11\" (1.803 m)   Wt 180 lb (81.6 kg)   SpO2 97%   BMI 25.10 kg/m²     General:       General appearance:   pleasant and well-hydrated. , in moderate discomfort and A & O x3  Build:Normal Weight     HEENT:     Head:normocephalic and atraumatic  Sclera: icterus absent,      Lungs:     Breathing:Breathing Pattern: regular, no distress     Abdomen:     Shape:non-distended and normal  Tenderness:none     Cervical spine:     Inspection:anterior fusion scar      Lumbar spine:     Spine inspection:scoliosis   CVA tenderness:No   Palpation:tenderness paravertebral muscles, facet loading, left, right, positive and tenderness.   Range of motion:abnormal moderately Lateral bending, flexion, extension rotation bilateral and is  painful.     Musculoskeletal:     Trigger points in Paraveteral:absent bilaterally  SI joint tenderness:positive right, positive left  Trochanteric bursa tenderness:positive right, positive left  SLR:negative right, negative left, sitting      Extremities:     Tremors:None bilaterally upper and lower  Range of motion:pain with internal rotation of hips positive right  Intact:Yes  Edema:Normal     Neurological:     Sensory:normal to light touch bilateral lower extremities. Motor:                            Right Quadriceps5-/5          Left Quadriceps5-/5           Right Gastrocnemius5-/5    Left Gastrocnemius5-/5  Right Ant Tibialis5-/5  Left Ant Tibialis5-/5  Gait:antalgic     Dermatology:     Skin:no unusual rashes and no skin lesions     Impression:  Low back pain with radiation to the right thigh and groin. Plan:  Follow up on her low back and right hip pain with no acute issues. Patient is s/p LESI L3-4 with less than expected response, not a candidate for repeating. Will start patient on Norco 7.5 QD PRN(20 pills given). Currently on Gabapentin 300 mg TID/Cymbalta 60 mg QD. Continue with Flexeril 10 mg QD PRN. OARRS report reviewed 02/2022. Urine screen and opioid agreement. Patient encouraged to stay active and to lose weight. Treatment plan discussed with the patient including medications and procedure side effects. We discussed with the patient that combining opioids, benzodiazepines, alcohol, illicit drugs or sleep aids increases the risk of respiratory depression including death. We discussed that these medications may cause drowsiness, sedation or dizziness and have counseled the patient not to drive or operate machinery. We have discussed that these medications will be prescribed only by one provider. We have discussed with the patient about age related risk factors and have thoroughly discussed the importance of taking these medications as prescribed. The patient verbalizes understanding. ccrefcathying garrett Dunham M.D.

## 2022-02-24 LAB
6-MONOACETYLMORPHINE, URINE: NOT DETECTED
ALCOHOL URINE: NOT DETECTED
AMPHETAMINE SCREEN, URINE: NOT DETECTED
BARBITURATE SCREEN URINE: NOT DETECTED
BENZODIAZEPINE SCREEN, URINE: NOT DETECTED
BUPRENORPHINE URINE: NOT DETECTED
CANNABINOID SCREEN URINE: NOT DETECTED
COCAINE METABOLITE SCREEN URINE: NOT DETECTED
FENTANYL SCREEN, URINE: NOT DETECTED
INTEGRITY CHECK, CREATININE, URINE: 65.3
INTEGRITY CHECK, OXIDANT, URINE: 110
INTEGRITY CHECK, PH, URINE: 4.7 (ref 4.5–9)
INTEGRITY CHECK, SPECIFIC GRAVITY, URINE: 1.02 (ref 1–1.03)
INTEGRITY CHECK, SPECIMEN INTEGRITY, URINE: NORMAL
Lab: NORMAL
METHADONE SCREEN, URINE: NOT DETECTED
OPIATE SCREEN URINE: NOT DETECTED
OXYCODONE URINE: NOT DETECTED
PHENCYCLIDINE SCREEN URINE: NOT DETECTED
TRAMADOL SCREEN URINE: NOT DETECTED

## 2022-02-25 LAB
6-MAM, QUANTITATIVE, URINE: <10
7-AMINOCLONAZEPAM, QUANTITATIVE, URINE: <50
ALPHA-HYDROXYALPRAZOLAM, QUANTITATIVE, URINE: <50
ALPHA-HYDROXYMIDAZOLAM, QUANTITATIVE, URINE: <50
ALPHA-HYDROXYTRIAZOLAM, QUANTITATIVE, URINE: <50
ALPRAZOLAM URINE QUANT: <50
CHLORDIAZEPOXIDE, QUANTITATIVE, URINE: <50
CLONAZEPAM, QUANTITATIVE, URINE: <50
CODEINE, QUANTITATIVE, URINE: <50
COMMENT: NORMAL
DIAZEPAM URINE QUANT: <50
FLUNITRAZEPAM, QUANTITATIVE, URINE: <50
FLURAZEPAM, QUANTITATIVE, URINE: <50
HYDROCODONE, QUANTITATIVE, URINE: 222.8
HYDROMORPHONE, QUANTITATIVE, URINE: 67
LORAZEPAM URINE QUANT: <50
MIDAZOLAM URINE QUANT: <50
MORPHINE, QUANTITATIVE, URINE: <50
NORDIAZEPAM URINE QUANT: <50
NORHYDROCODONE, QUANTITATIVE, URINE: 589.3
NOROXYCODONE, QUANTITATIVE, URINE: <50
OXAZEPAM URINE QUANT: <50
OXYCODONE URINE, QUANTITATIVE: <50
OXYMORPHONE, QUANTITATIVE, URINE: <50
TEMAZEPAM, QUANTITATIVE, URINE: <50

## 2022-03-21 ENCOUNTER — OFFICE VISIT (OUTPATIENT)
Dept: FAMILY MEDICINE CLINIC | Age: 67
End: 2022-03-21
Payer: MEDICARE

## 2022-03-21 VITALS
DIASTOLIC BLOOD PRESSURE: 70 MMHG | HEART RATE: 87 BPM | RESPIRATION RATE: 16 BRPM | BODY MASS INDEX: 26.46 KG/M2 | WEIGHT: 189 LBS | HEIGHT: 71 IN | OXYGEN SATURATION: 91 % | TEMPERATURE: 96.6 F | SYSTOLIC BLOOD PRESSURE: 132 MMHG

## 2022-03-21 DIAGNOSIS — E05.90 HYPERTHYROIDISM: Primary | ICD-10-CM

## 2022-03-21 DIAGNOSIS — E78.2 MIXED HYPERLIPIDEMIA: ICD-10-CM

## 2022-03-21 DIAGNOSIS — Z86.010 HX OF COLONOSCOPY WITH POLYPECTOMY: ICD-10-CM

## 2022-03-21 DIAGNOSIS — F41.8 ANXIETY WITH DEPRESSION: ICD-10-CM

## 2022-03-21 DIAGNOSIS — I10 BENIGN ESSENTIAL HYPERTENSION: ICD-10-CM

## 2022-03-21 DIAGNOSIS — Z95.5 STENTED CORONARY ARTERY: ICD-10-CM

## 2022-03-21 DIAGNOSIS — E05.90 HYPERTHYROIDISM: ICD-10-CM

## 2022-03-21 DIAGNOSIS — G71.3 MITOCHONDRIAL MYOPATHY: ICD-10-CM

## 2022-03-21 DIAGNOSIS — Z98.890 HX OF COLONOSCOPY WITH POLYPECTOMY: ICD-10-CM

## 2022-03-21 PROBLEM — I21.9 MYOCARDIAL INFARCTION (HCC): Status: ACTIVE | Noted: 2022-03-21

## 2022-03-21 LAB
ALBUMIN SERPL-MCNC: 4.3 G/DL (ref 3.5–5.2)
ALP BLD-CCNC: 132 U/L (ref 35–104)
ALT SERPL-CCNC: 16 U/L (ref 0–32)
ANION GAP SERPL CALCULATED.3IONS-SCNC: 11 MMOL/L (ref 7–16)
AST SERPL-CCNC: 23 U/L (ref 0–31)
BASOPHILS ABSOLUTE: 0.04 E9/L (ref 0–0.2)
BASOPHILS RELATIVE PERCENT: 0.7 % (ref 0–2)
BILIRUB SERPL-MCNC: 0.3 MG/DL (ref 0–1.2)
BUN BLDV-MCNC: 13 MG/DL (ref 6–23)
CALCIUM SERPL-MCNC: 9.4 MG/DL (ref 8.6–10.2)
CHLORIDE BLD-SCNC: 105 MMOL/L (ref 98–107)
CHOLESTEROL, TOTAL: 153 MG/DL (ref 0–199)
CO2: 25 MMOL/L (ref 22–29)
CREAT SERPL-MCNC: 0.6 MG/DL (ref 0.5–1)
EOSINOPHILS ABSOLUTE: 0.14 E9/L (ref 0.05–0.5)
EOSINOPHILS RELATIVE PERCENT: 2.4 % (ref 0–6)
GFR AFRICAN AMERICAN: >60
GFR NON-AFRICAN AMERICAN: >60 ML/MIN/1.73
GLUCOSE BLD-MCNC: 107 MG/DL (ref 74–99)
HCT VFR BLD CALC: 46.5 % (ref 34–48)
HDLC SERPL-MCNC: 40 MG/DL
HEMOGLOBIN: 14.8 G/DL (ref 11.5–15.5)
IMMATURE GRANULOCYTES #: 0.01 E9/L
IMMATURE GRANULOCYTES %: 0.2 % (ref 0–5)
LDL CHOLESTEROL CALCULATED: 76 MG/DL (ref 0–99)
LYMPHOCYTES ABSOLUTE: 1.9 E9/L (ref 1.5–4)
LYMPHOCYTES RELATIVE PERCENT: 32.1 % (ref 20–42)
MCH RBC QN AUTO: 29.3 PG (ref 26–35)
MCHC RBC AUTO-ENTMCNC: 31.8 % (ref 32–34.5)
MCV RBC AUTO: 92.1 FL (ref 80–99.9)
MONOCYTES ABSOLUTE: 0.5 E9/L (ref 0.1–0.95)
MONOCYTES RELATIVE PERCENT: 8.5 % (ref 2–12)
NEUTROPHILS ABSOLUTE: 3.32 E9/L (ref 1.8–7.3)
NEUTROPHILS RELATIVE PERCENT: 56.1 % (ref 43–80)
PDW BLD-RTO: 14.5 FL (ref 11.5–15)
PLATELET # BLD: 229 E9/L (ref 130–450)
PMV BLD AUTO: 10 FL (ref 7–12)
POTASSIUM SERPL-SCNC: 5 MMOL/L (ref 3.5–5)
RBC # BLD: 5.05 E12/L (ref 3.5–5.5)
SODIUM BLD-SCNC: 141 MMOL/L (ref 132–146)
T3 FREE: 4.7 PG/ML (ref 2–4.4)
T4 FREE: 1.51 NG/DL (ref 0.93–1.7)
TOTAL PROTEIN: 7.1 G/DL (ref 6.4–8.3)
TRIGL SERPL-MCNC: 185 MG/DL (ref 0–149)
TSH SERPL DL<=0.05 MIU/L-ACNC: <0.01 UIU/ML (ref 0.27–4.2)
VLDLC SERPL CALC-MCNC: 37 MG/DL
WBC # BLD: 5.9 E9/L (ref 4.5–11.5)

## 2022-03-21 PROCEDURE — G8427 DOCREV CUR MEDS BY ELIG CLIN: HCPCS | Performed by: NURSE PRACTITIONER

## 2022-03-21 PROCEDURE — G8399 PT W/DXA RESULTS DOCUMENT: HCPCS | Performed by: NURSE PRACTITIONER

## 2022-03-21 PROCEDURE — 3017F COLORECTAL CA SCREEN DOC REV: CPT | Performed by: NURSE PRACTITIONER

## 2022-03-21 PROCEDURE — 1123F ACP DISCUSS/DSCN MKR DOCD: CPT | Performed by: NURSE PRACTITIONER

## 2022-03-21 PROCEDURE — G8417 CALC BMI ABV UP PARAM F/U: HCPCS | Performed by: NURSE PRACTITIONER

## 2022-03-21 PROCEDURE — 99214 OFFICE O/P EST MOD 30 MIN: CPT | Performed by: NURSE PRACTITIONER

## 2022-03-21 PROCEDURE — G8484 FLU IMMUNIZE NO ADMIN: HCPCS | Performed by: NURSE PRACTITIONER

## 2022-03-21 PROCEDURE — 4040F PNEUMOC VAC/ADMIN/RCVD: CPT | Performed by: NURSE PRACTITIONER

## 2022-03-21 PROCEDURE — 1036F TOBACCO NON-USER: CPT | Performed by: NURSE PRACTITIONER

## 2022-03-21 PROCEDURE — 1090F PRES/ABSN URINE INCON ASSESS: CPT | Performed by: NURSE PRACTITIONER

## 2022-03-21 RX ORDER — CYCLOBENZAPRINE HCL 10 MG
10 TABLET ORAL DAILY
Qty: 90 TABLET | Refills: 0 | Status: SHIPPED
Start: 2022-03-21 | End: 2022-06-01

## 2022-03-21 RX ORDER — DULOXETIN HYDROCHLORIDE 60 MG/1
60 CAPSULE, DELAYED RELEASE ORAL DAILY
Qty: 90 CAPSULE | Refills: 1 | Status: SHIPPED
Start: 2022-03-21 | End: 2022-06-16 | Stop reason: SDUPTHER

## 2022-03-21 RX ORDER — CLOPIDOGREL BISULFATE 75 MG/1
75 TABLET ORAL DAILY
Qty: 90 TABLET | Refills: 1 | Status: SHIPPED
Start: 2022-03-21 | End: 2022-06-16 | Stop reason: SDUPTHER

## 2022-03-21 RX ORDER — GABAPENTIN 300 MG/1
300 CAPSULE ORAL 3 TIMES DAILY
Qty: 270 CAPSULE | Refills: 1 | Status: SHIPPED
Start: 2022-03-21 | End: 2022-06-16 | Stop reason: SDUPTHER

## 2022-03-21 RX ORDER — ACETAMINOPHEN 160 MG
TABLET,DISINTEGRATING ORAL
COMMUNITY

## 2022-03-21 RX ORDER — METOPROLOL SUCCINATE 50 MG/1
50 TABLET, EXTENDED RELEASE ORAL 2 TIMES DAILY
Qty: 180 TABLET | Refills: 1 | Status: SHIPPED
Start: 2022-03-21 | End: 2022-06-16 | Stop reason: SDUPTHER

## 2022-03-21 RX ORDER — ATORVASTATIN CALCIUM 10 MG/1
10 TABLET, FILM COATED ORAL DAILY
Qty: 90 TABLET | Refills: 1 | Status: SHIPPED
Start: 2022-03-21 | End: 2022-06-16 | Stop reason: SDUPTHER

## 2022-03-21 SDOH — ECONOMIC STABILITY: TRANSPORTATION INSECURITY
IN THE PAST 12 MONTHS, HAS LACK OF TRANSPORTATION KEPT YOU FROM MEETINGS, WORK, OR FROM GETTING THINGS NEEDED FOR DAILY LIVING?: NO

## 2022-03-21 SDOH — ECONOMIC STABILITY: FOOD INSECURITY: WITHIN THE PAST 12 MONTHS, YOU WORRIED THAT YOUR FOOD WOULD RUN OUT BEFORE YOU GOT MONEY TO BUY MORE.: NEVER TRUE

## 2022-03-21 SDOH — ECONOMIC STABILITY: TRANSPORTATION INSECURITY
IN THE PAST 12 MONTHS, HAS THE LACK OF TRANSPORTATION KEPT YOU FROM MEDICAL APPOINTMENTS OR FROM GETTING MEDICATIONS?: NO

## 2022-03-21 SDOH — ECONOMIC STABILITY: FOOD INSECURITY: WITHIN THE PAST 12 MONTHS, THE FOOD YOU BOUGHT JUST DIDN'T LAST AND YOU DIDN'T HAVE MONEY TO GET MORE.: NEVER TRUE

## 2022-03-21 ASSESSMENT — PATIENT HEALTH QUESTIONNAIRE - PHQ9
6. FEELING BAD ABOUT YOURSELF - OR THAT YOU ARE A FAILURE OR HAVE LET YOURSELF OR YOUR FAMILY DOWN: 0
3. TROUBLE FALLING OR STAYING ASLEEP: 0
9. THOUGHTS THAT YOU WOULD BE BETTER OFF DEAD, OR OF HURTING YOURSELF: 0
SUM OF ALL RESPONSES TO PHQ QUESTIONS 1-9: 0
1. LITTLE INTEREST OR PLEASURE IN DOING THINGS: 0
5. POOR APPETITE OR OVEREATING: 0
SUM OF ALL RESPONSES TO PHQ QUESTIONS 1-9: 0
SUM OF ALL RESPONSES TO PHQ9 QUESTIONS 1 & 2: 0
2. FEELING DOWN, DEPRESSED OR HOPELESS: 0
SUM OF ALL RESPONSES TO PHQ QUESTIONS 1-9: 0
10. IF YOU CHECKED OFF ANY PROBLEMS, HOW DIFFICULT HAVE THESE PROBLEMS MADE IT FOR YOU TO DO YOUR WORK, TAKE CARE OF THINGS AT HOME, OR GET ALONG WITH OTHER PEOPLE: 0
8. MOVING OR SPEAKING SO SLOWLY THAT OTHER PEOPLE COULD HAVE NOTICED. OR THE OPPOSITE, BEING SO FIGETY OR RESTLESS THAT YOU HAVE BEEN MOVING AROUND A LOT MORE THAN USUAL: 0
7. TROUBLE CONCENTRATING ON THINGS, SUCH AS READING THE NEWSPAPER OR WATCHING TELEVISION: 0
4. FEELING TIRED OR HAVING LITTLE ENERGY: 0
SUM OF ALL RESPONSES TO PHQ QUESTIONS 1-9: 0

## 2022-03-21 ASSESSMENT — ENCOUNTER SYMPTOMS
TROUBLE SWALLOWING: 0
BACK PAIN: 1
COLOR CHANGE: 0
COUGH: 0
CONSTIPATION: 0
SINUS PRESSURE: 0
DIARRHEA: 0
SORE THROAT: 0
WHEEZING: 0
VOICE CHANGE: 0
CHEST TIGHTNESS: 0
RHINORRHEA: 0
SHORTNESS OF BREATH: 0
FACIAL SWELLING: 0
ABDOMINAL PAIN: 0
SINUS PAIN: 0
VOMITING: 0
NAUSEA: 0

## 2022-03-21 ASSESSMENT — SOCIAL DETERMINANTS OF HEALTH (SDOH): HOW HARD IS IT FOR YOU TO PAY FOR THE VERY BASICS LIKE FOOD, HOUSING, MEDICAL CARE, AND HEATING?: NOT HARD AT ALL

## 2022-03-21 NOTE — PROGRESS NOTES
OFFICE PROGRESS NOTE  [address]  [address] [address]  Dept: [phone]   Chief Complaint   Patient presents with   [facility] Maintenance     due for dtap, shingles , pneu, awv, due low dose ct       ASSESSMENT/PLAN   1. Hyperthyroidism  Assessment & Plan:   Stable continue Tapazole 10 mg daily until labs returned and will see if improvement in WBC. TSH <0.010 despite taking the Tapazole 10 mg daily, FT4 normal, FT3 normal 1/3/2022,   Homogeneous uptake without dominant hot or cold nodule with 24 hour   uptake of 42% suggestive of Graves disease or thyrotoxic phase of Hashimoto's   thyroiditis. Orders:  -     CBC with Auto Differential; Future  -     TSH; Future  -     T4, Free; Future  -     Thyroid Peroxidase Antibody; Future  -     T3, Free; Future  -     Ambulatory referral to Endocrinology  2. Benign essential hypertension  Assessment & Plan:   well controlled, no significant medication side effects noted, needs to quit smoking and continue metoprolol XL 50 mg BID.   -Discussed taking medication and directed every day. -Discussed exercising daily 30 minutes 5 times a week for 150 minutes weekly.  -Discussed weight reduction if needed.  -Discussed low sodium diet.  -Discussed limiting caffeine consumption and tobacco cessation and the effects they have on the heart and blood pressure. Orders:  -     CBC with Auto Differential; Future  -     Comprehensive Metabolic Panel; Future  -     metoprolol succinate (TOPROL XL) 50 MG extended release tablet; Take 1 tablet by mouth 2 times daily, Disp-180 tablet, R-1Normal  3. Mixed hyperlipidemia  Assessment & Plan:   poorly controlled continue lipitor 10 mg at bedtime  will check fasting labs and then decide treatment. If triglycerides still elevated will start Zetia 10 mg daily with lipitor  as well.    Lipids 7/2021 mixed hyperlipidemia with triglycerides 335.  -Discussed low fat diet, limit fast food, goodies, breads and pastas if consuming several days a week,  limit any alcohol consumption.  -Discussed weight reduction and exercise 30 minutes 5 days a week for total of 150 minutes weekly.  -Discussed if any unusual muscle aching/pain to contact the office, discussed medication and risk of muscle pain/damage from Rhabdomyolysis. -Discussed repeat labs in 8 weeks. Orders:  -     Lipid Panel; Future  -     atorvastatin (LIPITOR) 10 MG tablet; Take 1 tablet by mouth daily, Disp-90 tablet, R-1Normal  4. Anxiety with depression  Assessment & Plan:  Well controlled continue Cymbalta 60 mg daily. Monitor for any suicidal or homicidal ideations. Orders:  -     DULoxetine (CYMBALTA) 60 MG extended release capsule; Take 1 capsule by mouth daily, Disp-90 capsule, R-1Normal  5. Mitochondrial myopathy  Assessment & Plan:   Well controlled asymptomatic at this time. Continue Gabapentin 300 mg TID. Controlled Substance Monitoring:    Acute and Chronic Pain Monitoring:   RX Monitoring 3/21/2022   Periodic Controlled Substance Monitoring Possible medication side effects, risk of tolerance/dependence & alternative treatments discussed. ;No signs of potential drug abuse or diversion identified. She follows with Dr Jazmyn Her for her pain medication  Orders:  -     cyclobenzaprine (FLEXERIL) 10 MG tablet; Take 1 tablet by mouth daily, Disp-90 tablet, R-0Normal  -     gabapentin (NEURONTIN) 300 MG capsule; Take 1 capsule by mouth 3 times daily for 180 days. , Disp-270 capsule, R-1Normal  6. Hx of colonoscopy with polypectomy  Assessment & Plan:   Colonoscopy tubular adenoma will need repeated in 5 years 2025  7. Stented coronary artery  -     clopidogrel (PLAVIX) 75 MG tablet;  Take 1 tablet by mouth daily, Disp-90 tablet, R-1Normal       Reviewed labs: CMP impaired FBS, elevated alkaline phosph, CBCD decreased WBC, elevated RBS indices she is back to smoking,  TSH <0.010 despite taking the Tapazole 10 mg daily, FT4 normal, FT3 normal 1/3/2022, Lipids 7/2021 mixed hyperlipidemia with triglycerides 335. Reviewed notes from Dr Juvencio Oneal 12/2/2021   Reviewed radiology Nuclear thyroid uptake 9/28/2021    Discussed smoking cessation, Discussed exercising 30 minutes daily and Discussed taking medications as directed and adverse effects    Return in about 1 month (around 4/21/2022) for Medicare well exam.     HPI:   Here to establish was seeing Dr Juvencio Oneal    Last colonoscopy at [facility] 3/2021 Dr Abdulaziz Dean  Findings:        No masses on digital rectal exam        Multiple small and large-mouthed diverticula were found in the        sigmoid colon and descending colon.        The sigmoid colon was significantly tortuous and fixed.        Non-bleeding internal hemorrhoids were found. The hemorrhoids were        small.        A 4 mm polyp was found in the ascending colon. The polyp was sessile.        The polyp was removed with a cold biopsy forceps. Resection and        retrieval were complete.        The exam was otherwise normal throughout the examined colon. Impression:           - Diverticulosis in the sigmoid colon and in the                         descending colon.                         - Tortuous and fixed sigmoid colon.                         - Non-bleeding internal hemorrhoids.                         - One 4 mm polyp in the ascending colon, removed                         with a cold biopsy forceps. Resected and retrieved. Estimated Blood Loss: Estimated blood loss: none.    Recommendation:       - Repeat colonoscopy date to be determined after                         pending pathology results are reviewed for                         surveillance.                         - If the pathology report reveals adenomatous                         tissue, then repeat the colonoscopy for surveillance                         in 5 years.   Carlus Mcburney                    - If the pathology report reveals no adenomatous                         tissue, then repeat the colonoscopy for surveillance                         in 7 years. She has hyperthyroid on Tapazole 10 mg daily as her WBC dropped she is asymptomatic at this time. She was referred to Dr Jesús Gilliland. Lilia Tripp but would like to see someone else. She does have some diarrhea but not like it was prior to her being diagnosed. She has also gained a little weight back and has some anxiety. She denies any vision problems. Nuclear med study indicates increased uptake consistent iwht Graves disease or thyrotoxic phase of Hashimoto's thyroiditis. Will recheck labs today. Hypertension: Patient here for follow-up of elevated blood pressure. She is exercising and is adherent to low salt diet. Blood pressure is well controlled at home. Cardiac symptoms none. Patient denies chest pain, chest pressure/discomfort, claudication, dyspnea, exertional chest pressure/discomfort, fatigue, irregular heart beat, lower extremity edema, near-syncope, orthopnea, palpitations, paroxysmal nocturnal dyspnea, syncope and tachypnea. Cardiovascular risk factors: advanced age (older than 54 for men, 72 for women), dyslipidemia, hypertension and smoking/ tobacco exposure. Use of agents associated with hypertension: none and Tapazole. History of target organ damage: angina/ prior myocardial infarction. Hyperlipidemia: Patient presents with hyperlipidemia. She was tested because HTN, hyperlipidemia. Her last labs 7/12/21 showed Total cholesterol of 207, HDL 30, ,  Triglycerides 335. She denies chest pain, dyspnea, exertional chest pressure/discomfort, fatigue, feeding intolerance, lower extremity edema, palpitations, poor exercise tolerance, syncope, tachypnea and skin xanthelasma. There is not a family history of hyperlipidemia. There is not a family history of early ischemia heart disease. She sees Dr Con Ni for her chronic back pain,     Depression: Patient complains of depression.  She complains of no symptoms at this time. Onset was approximately several years ago, stable since that time. She denies current suicidal and homicidal plan or intent. Family history significant for no psychiatric illness. Possible organic causes contributing are: none. Risk factors: previous episode of depression Previous treatment includes cymbalta and no counseling. She complains of the following side effects from the treatment: none. She has mitochondrial myopathy and is on Gabapentin 300 mg TID. States she is doing well on this dose. Needs all medications refilled today. Current Outpatient Medications:     Coenzyme Q10 (COQ-10) 100 MG CAPS, Take by mouth, Disp: , Rfl:     Cholecalciferol (VITAMIN D3) 50 MCG (2000 UT) CAPS, Take by mouth, Disp: , Rfl:     cyclobenzaprine (FLEXERIL) 10 MG tablet, Take 1 tablet by mouth daily, Disp: 90 tablet, Rfl: 0    metoprolol succinate (TOPROL XL) 50 MG extended release tablet, Take 1 tablet by mouth 2 times daily, Disp: 180 tablet, Rfl: 1    gabapentin (NEURONTIN) 300 MG capsule, Take 1 capsule by mouth 3 times daily for 180 days. , Disp: 270 capsule, Rfl: 1    clopidogrel (PLAVIX) 75 MG tablet, Take 1 tablet by mouth daily, Disp: 90 tablet, Rfl: 1    atorvastatin (LIPITOR) 10 MG tablet, Take 1 tablet by mouth daily, Disp: 90 tablet, Rfl: 1    DULoxetine (CYMBALTA) 60 MG extended release capsule, Take 1 capsule by mouth daily, Disp: 90 capsule, Rfl: 1    HYDROcodone-acetaminophen (NORCO) 7.5-325 MG per tablet, Take 1 tablet by mouth daily as needed for Pain for up to 30 days.  Intended supply: 30 days, Disp: 30 tablet, Rfl: 0    methIMAzole (TAPAZOLE) 10 MG tablet, Take 1 tablet by mouth 2 times daily (Patient taking differently: Take 10 mg by mouth daily ), Disp: 60 tablet, Rfl: 0    celecoxib (CELEBREX) 200 MG capsule, Take 1 capsule by mouth daily, Disp: 30 capsule, Rfl: 3    Handicap Placard MISC, Until 12/2/2026, Disp: 1 each, Rfl: 0   aspirin 81 MG tablet, Take 81 mg by mouth daily, Disp: , Rfl:       Surgical History:  has a past surgical history that includes Cholecystectomy; Tonsillectomy; Neck surgery; Coronary angioplasty with stent (2014); Hysterectomy; partial hysterectomy (cervix not removed); Appendectomy; Abdominal exploration surgery; and Pain management procedure (N/A, 2/7/2022). Social History:  reports that she quit smoking about 11 years ago. She has a 27.00 pack-year smoking history. She has never used smokeless tobacco. She reports that she does not drink alcohol and does not use drugs. Family History: family history includes Colon Cancer (age of onset: 80) in her father; Diabetes in her maternal grandmother and maternal uncle; Early Death in her mother; Heart Disease in her mother; Other (age of onset: 34) in her mother. I have reviewed Marina's allergies, medications, problem list, medical, social and family history and have updated as needed in the electronic medical record    Review of Systems   Constitutional: Negative for activity change, appetite change, chills, diaphoresis, fatigue, fever and unexpected weight change. HENT: Negative for congestion, dental problem, drooling, ear discharge, ear pain, facial swelling, hearing loss, mouth sores, nosebleeds, postnasal drip, rhinorrhea, sinus pressure, sinus pain, sneezing, sore throat, tinnitus, trouble swallowing and voice change. Eyes: Negative for visual disturbance. Respiratory: Negative for cough, chest tightness, shortness of breath and wheezing. Cardiovascular: Negative for chest pain, palpitations and leg swelling. Gastrointestinal: Negative for abdominal pain, constipation, diarrhea, nausea and vomiting. Endocrine: Negative for cold intolerance, heat intolerance, polydipsia, polyphagia and polyuria. Genitourinary: Negative for difficulty urinating, frequency and urgency. Musculoskeletal: Positive for back pain.  Negative for arthralgias, gait movement, no respiratory distress  Abdomen: soft, non-tender, non-distended, normal bowel sounds, no masses or hepatosplenomegaly  Musculoskeletal: Normal ROM, no joint swelling, deformity or tenderness, arthritis in fingers. Neurologic: reflexes normal and symmetric, no cranial nerve deficit, gait, coordination and speech normal  Extremities: no clubbing, cyanosis, or edema. Psychiatric: Good eye contact, normal mood and affect, answers questions appropriately    I have reviewed my findings and recommendations with Melissa Gomez.     Casey Cormier, APRN - CNP, NP-C, FNP-BC

## 2022-03-21 NOTE — ASSESSMENT & PLAN NOTE
well controlled, no significant medication side effects noted, needs to quit smoking and continue metoprolol XL 50 mg BID.   -Discussed taking medication and directed every day. -Discussed exercising daily 30 minutes 5 times a week for 150 minutes weekly.  -Discussed weight reduction if needed.  -Discussed low sodium diet.  -Discussed limiting caffeine consumption and tobacco cessation and the effects they have on the heart and blood pressure.

## 2022-03-21 NOTE — ASSESSMENT & PLAN NOTE
Stable continue Tapazole 10 mg daily until labs returned and will see if improvement in WBC. TSH <0.010 despite taking the Tapazole 10 mg daily, FT4 normal, FT3 normal 1/3/2022,   Homogeneous uptake without dominant hot or cold nodule with 24 hour   uptake of 42% suggestive of Graves disease or thyrotoxic phase of Hashimoto's   thyroiditis.

## 2022-03-21 NOTE — ASSESSMENT & PLAN NOTE
Well controlled asymptomatic at this time. Continue Gabapentin 300 mg TID. Controlled Substance Monitoring:    Acute and Chronic Pain Monitoring:   RX Monitoring 3/21/2022   Periodic Controlled Substance Monitoring Possible medication side effects, risk of tolerance/dependence & alternative treatments discussed. ;No signs of potential drug abuse or diversion identified.      She follows with Dr Rom Clifton for her pain medication

## 2022-03-21 NOTE — ASSESSMENT & PLAN NOTE
poorly controlled continue lipitor 10 mg at bedtime  will check fasting labs and then decide treatment. If triglycerides still elevated will start Zetia 10 mg daily with lipitor  as well. Lipids 7/2021 mixed hyperlipidemia with triglycerides 335.  -Discussed low fat diet, limit fast food, goodies, breads and pastas if consuming several days a week,  limit any alcohol consumption.  -Discussed weight reduction and exercise 30 minutes 5 days a week for total of 150 minutes weekly.  -Discussed if any unusual muscle aching/pain to contact the office, discussed medication and risk of muscle pain/damage from Rhabdomyolysis. -Discussed repeat labs in 8 weeks.

## 2022-03-23 ENCOUNTER — OFFICE VISIT (OUTPATIENT)
Dept: PAIN MANAGEMENT | Age: 67
End: 2022-03-23
Payer: MEDICARE

## 2022-03-23 VITALS
BODY MASS INDEX: 25.2 KG/M2 | HEIGHT: 71 IN | DIASTOLIC BLOOD PRESSURE: 82 MMHG | HEART RATE: 83 BPM | TEMPERATURE: 97.1 F | SYSTOLIC BLOOD PRESSURE: 130 MMHG | OXYGEN SATURATION: 96 % | WEIGHT: 180 LBS | RESPIRATION RATE: 16 BRPM

## 2022-03-23 DIAGNOSIS — M47.816 LUMBAR FACET ARTHROPATHY: ICD-10-CM

## 2022-03-23 DIAGNOSIS — M16.11 PRIMARY OSTEOARTHRITIS OF RIGHT HIP: ICD-10-CM

## 2022-03-23 DIAGNOSIS — G89.4 CHRONIC PAIN SYNDROME: Primary | ICD-10-CM

## 2022-03-23 DIAGNOSIS — M54.50 CHRONIC BILATERAL LOW BACK PAIN WITHOUT SCIATICA: ICD-10-CM

## 2022-03-23 DIAGNOSIS — M51.9 LUMBAR DISC DISORDER: ICD-10-CM

## 2022-03-23 DIAGNOSIS — M47.816 LUMBAR SPONDYLOSIS: ICD-10-CM

## 2022-03-23 DIAGNOSIS — G89.29 CHRONIC BILATERAL LOW BACK PAIN WITHOUT SCIATICA: ICD-10-CM

## 2022-03-23 DIAGNOSIS — M54.16 LUMBAR RADICULOPATHY: ICD-10-CM

## 2022-03-23 PROCEDURE — G8399 PT W/DXA RESULTS DOCUMENT: HCPCS | Performed by: PAIN MEDICINE

## 2022-03-23 PROCEDURE — G8484 FLU IMMUNIZE NO ADMIN: HCPCS | Performed by: PAIN MEDICINE

## 2022-03-23 PROCEDURE — 3017F COLORECTAL CA SCREEN DOC REV: CPT | Performed by: PAIN MEDICINE

## 2022-03-23 PROCEDURE — 1123F ACP DISCUSS/DSCN MKR DOCD: CPT | Performed by: PAIN MEDICINE

## 2022-03-23 PROCEDURE — 99213 OFFICE O/P EST LOW 20 MIN: CPT | Performed by: PAIN MEDICINE

## 2022-03-23 PROCEDURE — 4040F PNEUMOC VAC/ADMIN/RCVD: CPT | Performed by: PAIN MEDICINE

## 2022-03-23 PROCEDURE — 1036F TOBACCO NON-USER: CPT | Performed by: PAIN MEDICINE

## 2022-03-23 PROCEDURE — 1090F PRES/ABSN URINE INCON ASSESS: CPT | Performed by: PAIN MEDICINE

## 2022-03-23 PROCEDURE — G8417 CALC BMI ABV UP PARAM F/U: HCPCS | Performed by: PAIN MEDICINE

## 2022-03-23 PROCEDURE — G8427 DOCREV CUR MEDS BY ELIG CLIN: HCPCS | Performed by: PAIN MEDICINE

## 2022-03-23 RX ORDER — HYDROCODONE BITARTRATE AND ACETAMINOPHEN 7.5; 325 MG/1; MG/1
0.5 TABLET ORAL EVERY 8 HOURS PRN
Qty: 45 TABLET | Refills: 0 | Status: SHIPPED
Start: 2022-03-23 | End: 2022-05-18 | Stop reason: SDUPTHER

## 2022-03-23 NOTE — PROGRESS NOTES
[address] [facility], [address] [place] [address] Jordy  [phone]    Follow up Note      Yas Book     Date of Visit:  3/23/2022    CC:  Patient presents for follow up   Chief Complaint   Patient presents with    Follow-up    Back Pain    Other     down to thigh     HPI:    Pain is better managed  Appropriate analgesia with current medications regimen:Fair  Change in quality of symptoms:no. Medication side effects:none. Recent diagnostic testing:none  Recent interventional procedures:none    She has been on anticoagulation medications to include Plavix/ASA. The patient  has not been on herbal supplements. The patient is not diabetic. Imaging:   Lumbar spine MRI 2021  . Advanced degenerative change with slight grade 1 retrolisthesis at L2-3   and slight grade 1 anterolisthesis at L4-5.  Mild levoscoliosis. 2. Moderate central canal stenosis at L3-4 with right-sided disc protrusion   causing mild right subarticular recess stenosis and moderate right neural   foraminal stenosis. 3. Mild to moderate central canal stenosis at L2-3 with small right-sided   disc herniation causing mild right subarticular recess and neural foraminal   stenosis. 4. Small left-sided disc protrusion at L4-5 causing mild left subarticular   recess stenosis and neural foraminal stenosis.  No significant central canal   stenosis at this level. 5. Subchondral signal change about L2-3 with some some edema most likely   related to severe degenerative disc disease.  Clinical correlation for signs   of infection is suggested. Right hip Xray 2021:  Mild-to-moderate osteoarthritis at the right hip. Lumbar spine Xray 2016:  1. Left convex scoliosis. 2. Multilevel degenerative spondylosis throughout the lumbar spine. 3. Facet arthritis from L2-S1.   4. No acute abnormality is identified. Previous treatments: Physical Therapy and medications. .       Potential Aberrant Drug-Related Behavior:   No     Urine Drug Screening:  Urine screen 02/2022 showed no opioids which is consistent    OARRS report:  03/2022 consistent    Opioid Agreement:  Renewal date:03/2023    Past Medical History:   Diagnosis Date    Anxiety     CAD (coronary artery disease)     Carotid artery stenosis     Chronic back pain     COPD (chronic obstructive pulmonary disease) (HCC)     Depression     GERD (gastroesophageal reflux disease)     Hyperlipidemia     Hypertension     Hyperthyroidism     Mitochondrial disease ([address]     Neuropathy      Past Surgical History:   Procedure Laterality Date    ABDOMINAL EXPLORATION SURGERY      APPENDECTOMY      CHOLECYSTECTOMY      CORONARY ANGIOPLASTY WITH STENT PLACEMENT  2014    HYSTERECTOMY      NECK SURGERY      PAIN MANAGEMENT PROCEDURE N/A 2/7/2022    LUMBAR EPIDURAL STEROID INJECTION L3-4 WITH 80 DEPO performed by Karli Syed MD at [facility]       Prior to Admission medications    Medication Sig Start Date End Date Taking? Authorizing Provider   Coenzyme Q10 (COQ-10) 100 MG CAPS Take by mouth   Yes Historical Provider, MD   Cholecalciferol (VITAMIN D3) 50 MCG (2000 UT) CAPS Take by mouth   Yes Historical Provider, MD   cyclobenzaprine (FLEXERIL) 10 MG tablet Take 1 tablet by mouth daily 3/21/22  Yes SEFERINO Lincoln CNP   metoprolol succinate (TOPROL XL) 50 MG extended release tablet Take 1 tablet by mouth 2 times daily 3/21/22  Yes SEFERINO Juarez CNP   gabapentin (NEURONTIN) 300 MG capsule Take 1 capsule by mouth 3 times daily for 180 days.  3/21/22 9/17/22 Yes SEFERINO Juarez CNP   clopidogrel (PLAVIX) 75 MG tablet Take 1 tablet by mouth daily 3/21/22  Yes SEFERINO Lincoln CNP   atorvastatin (LIPITOR) 10 MG tablet Take 1 tablet by mouth daily 3/21/22  Yes SEFERINO Lincoln CNP   DULoxetine (CYMBALTA) 60 MG extended release capsule Take 1 capsule by mouth daily 3/21/22 Yes Natalio Sears APRN - CNP   HYDROcodone-acetaminophen (NORCO) 7.5-325 MG per tablet Take 1 tablet by mouth daily as needed for Pain for up to 30 days. Intended supply: 30 days 2/23/22 3/25/22 Yes Magy Nazario MD   methIMAzole (TAPAZOLE) 10 MG tablet Take 1 tablet by mouth 2 times daily  Patient taking differently: Take 10 mg by mouth daily  22  Yes Janusz Parish MD   celecoxib (CELEBREX) 200 MG capsule Take 1 capsule by mouth daily 21  Yes Janusz Parish MD   Handicap Placard [facility] Until 2026  Yes Janusz Parish MD   aspirin 81 MG tablet Take 81 mg by mouth daily   Yes Historical Provider, MD     Allergies   Allergen Reactions    Iodine Anaphylaxis and Hives    Evista [Raloxifene Hcl] Hives     Social History     Socioeconomic History    Marital status:      Spouse name: Not on file    Number of children: Not on file    Years of education: Not on file    Highest education level: Not on file   Occupational History    Not on file   Tobacco Use    Smoking status: Former Smoker     Packs/day: 1.00     Years: 27.00     Pack years: 27.00     Quit date:      Years since quittin.2    Smokeless tobacco: Never Used   Vaping Use    Vaping Use: Never used   Substance and Sexual Activity    Alcohol use: No    Drug use: No    Sexual activity: Not Currently   Other Topics Concern    Not on file   Social History Narrative    Not on file     Social Determinants of Health     Financial Resource Strain: Low Risk     Difficulty of Paying Living Expenses: Not hard at all   Food Insecurity: No Food Insecurity    Worried About [address] in the Last Year: Never true    [address] in the Last Year: Never true   Transportation Needs: No Transportation Needs    Lack of Transportation (Medical): No    Lack of Transportation (Non-Medical):  No   Physical Activity:     Days of Exercise per Week: Not on file    Minutes of Exercise per Session: Not on file   Stress:     Feeling of Stress : Not on file   Social Connections:     Frequency of Communication with Friends and Family: Not on file    Frequency of Social Gatherings with Friends and Family: Not on file    Attends Spiritism Services: Not on file    Active Member of [address] [place] [address] [employer] or Organizations: Not on file    Attends Club or Organization Meetings: Not on file    Marital Status: Not on file   Intimate Partner Violence:     Fear of Current or Ex-Partner: Not on file    Emotionally Abused: Not on file    Physically Abused: Not on file    Sexually Abused: Not on file   Housing Stability:     Unable to Pay for Housing in the Last Year: Not on file    Number of [place] in the Last Year: Not on file    Unstable Housing in the Last Year: Not on file     Family History   Problem Relation Age of Onset    Early Death Mother     Heart Disease Mother    Nikolas Carbon Other Mother 34        blood clot      Colon Cancer Father 80    Diabetes Maternal Uncle     Diabetes Maternal Grandmother      REVIEW OF SYSTEMS:     Amberly Talbert denies fever/chills, chest pain, shortness of breath, new bowel or bladder complaints. All other review of systems was negative. PHYSICAL EXAMINATION:      /82   Pulse 83   Temp 97.1 °F (36.2 °C) (Infrared)   Resp 16   Ht 5' 11\" (1.803 m)   Wt 180 lb (81.6 kg)   SpO2 96%   BMI 25.10 kg/m²     General:       General appearance:   pleasant and well-hydrated. , in moderate discomfort and A & O x3  Build:Normal Weight     HEENT:     Head:normocephalic and atraumatic  Sclera: icterus absent,      Lungs:     Breathing:Breathing Pattern: regular, no distress     Abdomen:     Shape:non-distended and normal  Tenderness:none     Cervical spine:     Inspection:anterior fusion scar      Lumbar spine:     Spine inspection:scoliosis   CVA tenderness:No   Palpation:tenderness paravertebral muscles, facet loading, left, right, positive and tenderness.   Range of motion:abnormal moderately Lateral bending, flexion, extension rotation bilateral and is  painful.     Musculoskeletal:     Trigger points in Paraveteral:absent bilaterally  SI joint tenderness:positive right, positive left  Trochanteric bursa tenderness:positive right, positive left  SLR:negative right, negative left, sitting      Extremities:     Tremors:None bilaterally upper and lower  Range of motion:pain with internal rotation of hips positive right  Intact:Yes  Edema:Normal     Neurological:     Sensory:normal to light touch bilateral lower extremities. Motor:                            Right Quadriceps5-/5          Left Quadriceps5-/5           Right Gastrocnemius5-/5    Left Gastrocnemius5-/5  Right Ant Tibialis5-/5  Left Ant Tibialis5-/5  Gait:antalgic     Dermatology:     Skin:no unusual rashes and no skin lesions     Impression:  Low back pain with radiation to the right thigh and groin. Plan:  Follow up on her low back and right hip pain with no acute issues, managed with current medications regimen. Continue Norco 7.5 QD PRN(patient given 45 pills to last two month). Currently on Gabapentin 300 mg TID/Cymbalta 60 mg QD. Continue with Flexeril 10 mg QD PRN. OARRS report reviewed 03/2022. Reviewed last office visit urine screen. Patient encouraged to stay active and to lose weight. Treatment plan discussed with the patient including medications side effects. We discussed with the patient that combining opioids, benzodiazepines, alcohol, illicit drugs or sleep aids increases the risk of respiratory depression including death. We discussed that these medications may cause drowsiness, sedation or dizziness and have counseled the patient not to drive or operate machinery. We have discussed that these medications will be prescribed only by one provider. We have discussed with the patient about age related risk factors and have thoroughly discussed the importance of taking these medications as prescribed.  The patient verbalizes understanding. je Barlow M.D.

## 2022-03-23 NOTE — PROGRESS NOTES
Do you currently have any of the following:    Fever: No  Headache:  No  Cough: No  Shortness of breath: No  Exposed to anyone with these symptoms: No                                                                                                                Regulo Mcdaniels presents to the [facility] on 3/23/2022. Isidro Bermudez is complaining of pain lower back and down both legs to thighs. The pain is intermittent. The pain is described as feels like she has been hit by a bus. Pain is rated on her best day at a 5, on her worst day at a 10, and on average at a 7 on the VAS scale. She took her last dose of Norco, Neurontin and cymbalta, flexeril . Isidro Bermudez does not have issues with constipation. Any procedures since your last visit: No,      She is not on NSAIDS and  is  on anticoagulation medications to include Plavix and is managed by Sury Youssef MD  .     Pacemaker or defibrillator: No Physician   Medication Contract and Consent for Opioid Use Documents Filed      No documents found                   /82   Pulse 83   Temp 97.1 °F (36.2 °C) (Infrared)   Resp 16   Ht 5' 11\" (1.803 m)   Wt 180 lb (81.6 kg)   SpO2 96%   BMI 25.10 kg/m²      No LMP recorded. Patient has had a hysterectomy.

## 2022-03-24 LAB — THYROID PEROXIDASE (TPO) ABS: 5.7 IU/ML (ref 0–25)

## 2022-05-18 ENCOUNTER — OFFICE VISIT (OUTPATIENT)
Dept: PAIN MANAGEMENT | Age: 67
End: 2022-05-18
Payer: MEDICARE

## 2022-05-18 VITALS
HEART RATE: 95 BPM | DIASTOLIC BLOOD PRESSURE: 70 MMHG | WEIGHT: 178 LBS | RESPIRATION RATE: 16 BRPM | BODY MASS INDEX: 24.92 KG/M2 | HEIGHT: 71 IN | TEMPERATURE: 97.1 F | OXYGEN SATURATION: 98 % | SYSTOLIC BLOOD PRESSURE: 124 MMHG

## 2022-05-18 DIAGNOSIS — M54.16 LUMBAR RADICULOPATHY: ICD-10-CM

## 2022-05-18 DIAGNOSIS — M47.816 LUMBAR SPONDYLOSIS: ICD-10-CM

## 2022-05-18 DIAGNOSIS — M16.11 PRIMARY OSTEOARTHRITIS OF RIGHT HIP: ICD-10-CM

## 2022-05-18 DIAGNOSIS — G89.4 CHRONIC PAIN SYNDROME: ICD-10-CM

## 2022-05-18 PROCEDURE — 99213 OFFICE O/P EST LOW 20 MIN: CPT | Performed by: NURSE PRACTITIONER

## 2022-05-18 PROCEDURE — 3017F COLORECTAL CA SCREEN DOC REV: CPT | Performed by: NURSE PRACTITIONER

## 2022-05-18 PROCEDURE — G8428 CUR MEDS NOT DOCUMENT: HCPCS | Performed by: NURSE PRACTITIONER

## 2022-05-18 PROCEDURE — G8399 PT W/DXA RESULTS DOCUMENT: HCPCS | Performed by: NURSE PRACTITIONER

## 2022-05-18 PROCEDURE — 1123F ACP DISCUSS/DSCN MKR DOCD: CPT | Performed by: NURSE PRACTITIONER

## 2022-05-18 PROCEDURE — 99213 OFFICE O/P EST LOW 20 MIN: CPT

## 2022-05-18 PROCEDURE — G8420 CALC BMI NORM PARAMETERS: HCPCS | Performed by: NURSE PRACTITIONER

## 2022-05-18 PROCEDURE — 1036F TOBACCO NON-USER: CPT | Performed by: NURSE PRACTITIONER

## 2022-05-18 PROCEDURE — 1090F PRES/ABSN URINE INCON ASSESS: CPT | Performed by: NURSE PRACTITIONER

## 2022-05-18 PROCEDURE — 4040F PNEUMOC VAC/ADMIN/RCVD: CPT | Performed by: NURSE PRACTITIONER

## 2022-05-18 RX ORDER — HYDROCODONE BITARTRATE AND ACETAMINOPHEN 7.5; 325 MG/1; MG/1
0.5 TABLET ORAL EVERY 8 HOURS PRN
Qty: 45 TABLET | Refills: 0 | Status: SHIPPED
Start: 2022-05-18 | End: 2022-07-06 | Stop reason: SDUPTHER

## 2022-05-18 NOTE — PROGRESS NOTES
Do you currently have any of the following:    Fever: No  Headache:  No  Cough: No  Shortness of breath: No  Exposed to anyone with these symptoms: Melania                                                                                                                Lashell Britt presents to the [facility] on 5/18/2022. Anson Barajas is complaining of pain lower back. The pain is constant. The pain is described as aching, throbbing, shooting, stabbing and sharp. Pain is rated on her best day at a 5, on her worst day at a 10, and on average at a 7 on the VAS scale. She took her last dose of Neurontin and flexeril, cymbalta . Anson Barajas does not have issues with constipation. Any procedures since your last visit: No,      She is  on NSAIDS and  is on anticoagulation medications to include Plavix and is managed by SEFERINO Smith CNP  . Pacemaker or defibrillator:   Medication Contract and Consent for Opioid Use Documents Filed      No documents found                   /70   Pulse 95   Temp 97.1 °F (36.2 °C) (Infrared)   Resp 16   Ht 5' 11\" (1.803 m)   Wt 178 lb (80.7 kg)   SpO2 98%   BMI 24.83 kg/m²      No LMP recorded. Patient has had a hysterectomy.

## 2022-05-18 NOTE — PROGRESS NOTES
is identified.      Previous treatments: Physical Therapy and medications. .                             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 Authorizing Provider   Coenzyme Q10 (COQ-10) 100 MG CAPS Take by mouth    Historical Provider, MD   Cholecalciferol (VITAMIN D3) 50 MCG (2000 UT) CAPS Take by mouth    Historical Provider, MD   cyclobenzaprine (FLEXERIL) 10 MG tablet Take 1 tablet by mouth daily 3/21/22   SEFERINO Sales CNP   metoprolol succinate (TOPROL XL) 50 MG extended release tablet Take 1 tablet by mouth 2 times daily 3/21/22   SEFERINO Sales CNP   gabapentin (NEURONTIN) 300 MG capsule Take 1 capsule by mouth 3 times daily for 180 days.  3/21/22 9/17/22  SEFERINO Sales CNP   clopidogrel (PLAVIX) 75 MG tablet Take 1 tablet by mouth daily 3/21/22   SEFERINO Sales CNP   atorvastatin (LIPITOR) 10 MG tablet Take 1 tablet by mouth daily 3/21/22   Thong Scott SEFERINO Luo - CNP   DULoxetine (CYMBALTA) 60 MG extended release capsule Take 1 capsule by mouth daily 3/21/22   SEFERINO Barclay CNP   methIMAzole (TAPAZOLE) 10 MG tablet Take 1 tablet by mouth 2 times daily  Patient taking differently: Take 10 mg by mouth daily  22   Adrian Carias MD   celecoxib (CELEBREX) 200 MG capsule Take 1 capsule by mouth daily 21   Adrian Carias MD   Handicap Placard MISC Until 2026   Adrian Carias MD   aspirin 81 MG tablet Take 81 mg by mouth daily    Historical Provider, MD       Allergies   Allergen Reactions    Iodine Anaphylaxis and Hives    Evista [Raloxifene Hcl] Hives       Social History     Socioeconomic History    Marital status:      Spouse name: Not on file    Number of children: Not on file    Years of education: Not on file    Highest education level: Not on file   Occupational History    Not on file   Tobacco Use    Smoking status: Former Smoker     Packs/day: 1.00     Years: 27.00     Pack years: 27.00     Quit date:      Years since quittin.3    Smokeless tobacco: Never Used   Vaping Use    Vaping Use: Never used   Substance and Sexual Activity    Alcohol use: No    Drug use: No    Sexual activity: Not Currently   Other Topics Concern    Not on file   Social History Narrative    Not on file     Social Determinants of Health     Financial Resource Strain: Low Risk     Difficulty of Paying Living Expenses: Not hard at all   Food Insecurity: No Food Insecurity    Worried About [facility] in the Last Year: Never true    [facility] in the Last Year: Never true   Transportation Needs: No Transportation Needs    Lack of Transportation (Medical): No    Lack of Transportation (Non-Medical):  No   Physical Activity:     Days of Exercise per Week: Not on file    Minutes of Exercise per Session: Not on file   Stress:     Feeling of Stress : Not on file   Social Connections:     Frequency of Communication with Friends and Family: Not on file    Frequency of Social Gatherings with Friends and Family: Not on file    Attends [employer] Services: Not on file    Active Member of Clubs or Organizations: Not on file    Attends Club or Organization Meetings: Not on file    Marital Status: Not on file   Intimate Partner Violence:     Fear of Current or Ex-Partner: Not on file    Emotionally Abused: Not on file    Physically Abused: Not on file    Sexually Abused: Not on file   Housing Stability:     Unable to Pay for Housing in the Last Year: Not on file    Number of [place] in the Last Year: Not on file    Unstable Housing in the Last Year: Not on file       Family History   Problem Relation Age of Onset    Early Death Mother     Heart Disease Mother    [facility] Other Mother 34        blood clot      Colon Cancer Father 80    Diabetes Maternal Uncle     Diabetes Maternal Grandmother        REVIEW OF SYSTEMS:     Rehan Solano denies fever/chills, chest pain, shortness of breath, new bowel or bladder complaints or suicidal ideations. All other review of systems wasnegative. Review of Systems    PHYSICAL EXAMINATION:      General:       General appearance:   pleasant and well-hydrated. , in moderate discomfort and A & O x3  Build:Normal Weight     HEENT:     Head:normocephalic and atraumatic  Sclera: icterus absent,      Lungs:     Breathing:Breathing Pattern: regular, no distress     Abdomen:     Shape:non-distended and normal  Tenderness:none     Cervical spine:     Inspection:anterior fusion scar      Lumbar spine:     Spine inspection:scoliosis   CVA tenderness:No   Palpation:tenderness paravertebral muscles, facet loading, left, right, positive and tenderness.   Range of motion:abnormal moderately Lateral bending, flexion, extension rotation bilateral and is  painful.     Musculoskeletal:     Trigger points in Paraveteral:absent bilaterally  SI joint tenderness:positive right, positive left  Trochanteric bursa tenderness:positive right, positive left  SLR:negative right, negative left, sitting      Extremities:     Tremors:None bilaterally upper and lower  Range of motion:pain with internal rotation of hips positive right  Intact:Yes  Edema:Normal     Neurological:     Sensory:normal to light touch bilateral lower extremities. Motor:                            Right Quadriceps5-/5          Left Quadriceps5-/5           Right Gastrocnemius5-/5    Left Gastrocnemius5-/5  Right Ant Tibialis5-/5  Left Ant Tibialis5-/5  Gait:antalgic     Dermatology:     Skin:no unusual rashes and no skin lesions     Impression:  Low back pain with radiation to the right thigh and groin. Plan:  Follow up on her low back and right hip pain with no acute issues, managed with current medications regimen. Refill Norco 7.5 QD PRN(patient given 45 pills to last two month). Trial Ztlido patches, tried and failed otc lidocaine ointment  NO NSAIDS on Plavix  Gabapentin 300 mg TID/Cymbalta 60 mg QD by PCP  Continue with Flexeril 10 mg QD PRN. OARRS report reviewed   Patient encouraged to stay active and to lose weight. Treatment plan discussed with the patient including medications side effects.     We discussed with the patient that combining opioids, benzodiazepines, alcohol, illicit drugs or sleep aids increases the risk of respiratory depression including death. We discussed that these medications may cause drowsiness, sedation or dizziness and have counseled the patient not to drive or operate machinery. We have discussed that these medications will be prescribed only by one provider. We have discussed with the patient about age related risk factors and have thoroughly discussed the importance of taking these medications as prescribed.  The patient verbalizes understanding.     ccreferring physic    Electronically signed by SEFERINO Biswas CNP on 5/18/22 at 1:13 PM EDT

## 2022-05-20 ENCOUNTER — TELEPHONE (OUTPATIENT)
Dept: PAIN MANAGEMENT | Age: 67
End: 2022-05-20

## 2022-05-20 RX ORDER — LIDOCAINE 50 MG/G
PATCH TOPICAL
Qty: 90 PATCH | Refills: 0 | Status: SHIPPED | OUTPATIENT
Start: 2022-05-20

## 2022-05-20 RX ORDER — LIDOCAINE 50 MG/G
1 PATCH TOPICAL DAILY
Qty: 30 PATCH | Refills: 3 | Status: SHIPPED
Start: 2022-05-20 | End: 2022-05-20

## 2022-05-20 NOTE — TELEPHONE ENCOUNTER
[employer] called in, the pain patches you gave her work, and she would like you to order some at [facility], [facility].

## 2022-05-20 NOTE — TELEPHONE ENCOUNTER
Lidoderm patches sent. I do not believe ztlido will be covered. Pt has tried and failed lidocaine ointment and cannot take NSAIDS with plavix if PA needed.

## 2022-05-28 DIAGNOSIS — G71.3 MITOCHONDRIAL MYOPATHY: ICD-10-CM

## 2022-06-01 RX ORDER — CYCLOBENZAPRINE HCL 10 MG
10 TABLET ORAL DAILY
Qty: 90 TABLET | Refills: 0 | Status: SHIPPED
Start: 2022-06-01 | End: 2022-08-26

## 2022-06-16 ENCOUNTER — OFFICE VISIT (OUTPATIENT)
Dept: FAMILY MEDICINE CLINIC | Age: 67
End: 2022-06-16
Payer: MEDICARE

## 2022-06-16 VITALS
WEIGHT: 182.2 LBS | BODY MASS INDEX: 25.51 KG/M2 | HEART RATE: 96 BPM | TEMPERATURE: 96.9 F | RESPIRATION RATE: 16 BRPM | DIASTOLIC BLOOD PRESSURE: 72 MMHG | OXYGEN SATURATION: 98 % | HEIGHT: 71 IN | SYSTOLIC BLOOD PRESSURE: 128 MMHG

## 2022-06-16 DIAGNOSIS — Z95.5 STENTED CORONARY ARTERY: ICD-10-CM

## 2022-06-16 DIAGNOSIS — Z00.00 INITIAL MEDICARE ANNUAL WELLNESS VISIT: Primary | ICD-10-CM

## 2022-06-16 DIAGNOSIS — G71.3 MITOCHONDRIAL MYOPATHY: ICD-10-CM

## 2022-06-16 DIAGNOSIS — I10 BENIGN ESSENTIAL HYPERTENSION: ICD-10-CM

## 2022-06-16 DIAGNOSIS — Z23 NEED FOR VACCINATION AGAINST STREPTOCOCCUS PNEUMONIAE: ICD-10-CM

## 2022-06-16 DIAGNOSIS — E78.2 MIXED HYPERLIPIDEMIA: ICD-10-CM

## 2022-06-16 DIAGNOSIS — F41.8 ANXIETY WITH DEPRESSION: ICD-10-CM

## 2022-06-16 DIAGNOSIS — Z23 NEED FOR PROPHYLACTIC VACCINATION AGAINST DIPHTHERIA AND TETANUS: ICD-10-CM

## 2022-06-16 DIAGNOSIS — Z23 NEED FOR SHINGLES VACCINE: ICD-10-CM

## 2022-06-16 DIAGNOSIS — E05.90 HYPERTHYROIDISM: ICD-10-CM

## 2022-06-16 PROCEDURE — 90677 PCV20 VACCINE IM: CPT | Performed by: NURSE PRACTITIONER

## 2022-06-16 PROCEDURE — G0438 PPPS, INITIAL VISIT: HCPCS | Performed by: NURSE PRACTITIONER

## 2022-06-16 PROCEDURE — G0009 ADMIN PNEUMOCOCCAL VACCINE: HCPCS | Performed by: NURSE PRACTITIONER

## 2022-06-16 PROCEDURE — 3017F COLORECTAL CA SCREEN DOC REV: CPT | Performed by: NURSE PRACTITIONER

## 2022-06-16 PROCEDURE — 1123F ACP DISCUSS/DSCN MKR DOCD: CPT | Performed by: NURSE PRACTITIONER

## 2022-06-16 RX ORDER — GABAPENTIN 300 MG/1
300 CAPSULE ORAL 3 TIMES DAILY
Qty: 270 CAPSULE | Refills: 3 | Status: SHIPPED | OUTPATIENT
Start: 2022-06-16 | End: 2023-03-13

## 2022-06-16 RX ORDER — METOPROLOL SUCCINATE 50 MG/1
50 TABLET, EXTENDED RELEASE ORAL 2 TIMES DAILY
Qty: 180 TABLET | Refills: 1 | Status: SHIPPED | OUTPATIENT
Start: 2022-06-16

## 2022-06-16 RX ORDER — ZOSTER VACCINE RECOMBINANT, ADJUVANTED 50 MCG/0.5
0.5 KIT INTRAMUSCULAR SEE ADMIN INSTRUCTIONS
Qty: 0.5 ML | Refills: 1 | Status: SHIPPED | OUTPATIENT
Start: 2022-06-16 | End: 2022-12-13

## 2022-06-16 RX ORDER — ATORVASTATIN CALCIUM 10 MG/1
10 TABLET, FILM COATED ORAL DAILY
Qty: 90 TABLET | Refills: 1 | Status: SHIPPED | OUTPATIENT
Start: 2022-06-16

## 2022-06-16 RX ORDER — CLOPIDOGREL BISULFATE 75 MG/1
75 TABLET ORAL DAILY
Qty: 90 TABLET | Refills: 1 | Status: SHIPPED | OUTPATIENT
Start: 2022-06-16

## 2022-06-16 RX ORDER — DULOXETIN HYDROCHLORIDE 60 MG/1
60 CAPSULE, DELAYED RELEASE ORAL DAILY
Qty: 90 CAPSULE | Refills: 1 | Status: SHIPPED | OUTPATIENT
Start: 2022-06-16

## 2022-06-16 ASSESSMENT — PATIENT HEALTH QUESTIONNAIRE - PHQ9
5. POOR APPETITE OR OVEREATING: 0
7. TROUBLE CONCENTRATING ON THINGS, SUCH AS READING THE NEWSPAPER OR WATCHING TELEVISION: 0
SUM OF ALL RESPONSES TO PHQ QUESTIONS 1-9: 0
8. MOVING OR SPEAKING SO SLOWLY THAT OTHER PEOPLE COULD HAVE NOTICED. OR THE OPPOSITE, BEING SO FIGETY OR RESTLESS THAT YOU HAVE BEEN MOVING AROUND A LOT MORE THAN USUAL: 0
SUM OF ALL RESPONSES TO PHQ QUESTIONS 1-9: 0
6. FEELING BAD ABOUT YOURSELF - OR THAT YOU ARE A FAILURE OR HAVE LET YOURSELF OR YOUR FAMILY DOWN: 0
SUM OF ALL RESPONSES TO PHQ QUESTIONS 1-9: 0
3. TROUBLE FALLING OR STAYING ASLEEP: 0
9. THOUGHTS THAT YOU WOULD BE BETTER OFF DEAD, OR OF HURTING YOURSELF: 0
2. FEELING DOWN, DEPRESSED OR HOPELESS: 0
10. IF YOU CHECKED OFF ANY PROBLEMS, HOW DIFFICULT HAVE THESE PROBLEMS MADE IT FOR YOU TO DO YOUR WORK, TAKE CARE OF THINGS AT HOME, OR GET ALONG WITH OTHER PEOPLE: 0
1. LITTLE INTEREST OR PLEASURE IN DOING THINGS: 0
SUM OF ALL RESPONSES TO PHQ9 QUESTIONS 1 & 2: 0
4. FEELING TIRED OR HAVING LITTLE ENERGY: 0
SUM OF ALL RESPONSES TO PHQ QUESTIONS 1-9: 0

## 2022-06-16 NOTE — ASSESSMENT & PLAN NOTE
About half of people who get PPSV have mild side effects, such as redness or pain where the shot is given, which go away within about two days. Less than 1 out of 100 people develop a fever, muscle aches, or more severe local reactions.
Stable continue gabapentin 300 mg TID. RX renewed today. Controlled Substance Monitoring:    Acute and Chronic Pain Monitoring:   RX Monitoring 6/16/2022   Periodic Controlled Substance Monitoring Possible medication side effects, risk of tolerance/dependence & alternative treatments discussed. ;No signs of potential drug abuse or diversion identified.
Stable will check labs tomorrow and resume Tapazole 10 mg BID take with food.
Wait for 1 month to get first shingles vaccine then repeat in 2 months   . shinglesvacc  · A sore arm with mild or moderate pain is very common after recombinant shingles vaccine, affecting about 80% of vaccinated people. Redness and swelling can also happen at the site of the injection. · Tiredness, muscle pain, headache, shivering, fever, stomach pain, and nausea happen after vaccination in more than half of people who receive recombinant shingles vaccine.
Wait one week to get Tetanus. Common side effects of Td vaccination include soreness in the arm where you got the shot and a mild fever.  These usually occur within 3 days of the shot and last a short time
well controlled, no significant medication side effects noted and continue atorvastatin 10 mg nightly  -Discussed low fat diet, limit fast food, goodies, breads and pastas if consuming several days a week,  limit any alcohol consumption.  -Discussed weight reduction and exercise 30 minutes 5 days a week for total of 150 minutes weekly.  -Discussed if any unusual muscle aching/pain to contact the office, discussed medication and risk of muscle pain/damage from Rhabdomyolysis.
negative

## 2022-06-16 NOTE — PATIENT INSTRUCTIONS
Personalized Preventive Plan for Sharon Patel - 6/16/2022  Medicare offers a range of preventive health benefits. Some of the tests and screenings are paid in full while other may be subject to a deductible, co-insurance, and/or copay. Some of these benefits include a comprehensive review of your medical history including lifestyle, illnesses that may run in your family, and various assessments and screenings as appropriate. After reviewing your medical record and screening and assessments performed today your provider may have ordered immunizations, labs, imaging, and/or referrals for you. A list of these orders (if applicable) as well as your Preventive Care list are included within your After Visit Summary for your review. Other Preventive Recommendations:    · A preventive eye exam performed by an eye specialist is recommended every 1-2 years to screen for glaucoma; cataracts, macular degeneration, and other eye disorders. · A preventive dental visit is recommended every 6 months. · Try to get at least 150 minutes of exercise per week or 10,000 steps per day on a pedometer . · Order or download the FREE \"Exercise & Physical Activity: Your Everyday Guide\" from [employer]. Call 4-591.490.4051 or search [employer] online. · You need 6030-4650 mg of calcium and 4441-2479 IU of vitamin D per day. It is possible to meet your calcium requirement with diet alone, but a vitamin D supplement is usually necessary to meet this goal.  · When exposed to the sun, use a sunscreen that protects against both UVA and UVB radiation with an SPF of 30 or greater. Reapply every 2 to 3 hours or after sweating, drying off with a towel, or swimming. · Always wear a seat belt when traveling in a car. Always wear a helmet when riding a bicycle or motorcycle.

## 2022-06-16 NOTE — PROGRESS NOTES
Medicare Annual Wellness Visit    Cayden Wynn is here for Medicare AWV and Health Maintenance (Due for shingles, tdap, Low dose ct, )    Assessment & Plan   Initial Medicare annual wellness visit  Hyperthyroidism  Assessment & Plan:   Stable will check labs tomorrow and resume Tapazole 10 mg BID take with food. Orders:  -     TSH; Future  -     T4, Free; Future  -     T3, Free; Future  Benign essential hypertension  Assessment & Plan:   well controlled, no significant medication side effects noted and metoprolol XL 50 mg BID.   -Discussed taking medication and directed every day. -Discussed exercising daily 30 minutes 5 times a week for 150 minutes weekly.  -Discussed weight reduction if needed.  -Discussed low sodium diet.  -Discussed limiting caffeine consumption and tobacco cessation and the effects they have on the heart and blood pressure. Orders:  -     metoprolol succinate (TOPROL XL) 50 MG extended release tablet; Take 1 tablet by mouth 2 times daily, Disp-180 tablet, R-1Normal  Mixed hyperlipidemia  Assessment & Plan:   well controlled, no significant medication side effects noted and continue atorvastatin 10 mg nightly  -Discussed low fat diet, limit fast food, goodies, breads and pastas if consuming several days a week,  limit any alcohol consumption.  -Discussed weight reduction and exercise 30 minutes 5 days a week for total of 150 minutes weekly.  -Discussed if any unusual muscle aching/pain to contact the office, discussed medication and risk of muscle pain/damage from Rhabdomyolysis. Orders:  -     atorvastatin (LIPITOR) 10 MG tablet; Take 1 tablet by mouth daily, Disp-90 tablet, R-1Normal  Mitochondrial myopathy  Assessment & Plan:   Stable continue gabapentin 300 mg TID. RX renewed today.    Controlled Substance Monitoring:    Acute and Chronic Pain Monitoring:   RX Monitoring 6/16/2022   Periodic Controlled Substance Monitoring Possible medication side effects, risk of tolerance/dependence & alternative treatments discussed. ;No signs of potential drug abuse or diversion identified. Orders:  -     gabapentin (NEURONTIN) 300 MG capsule; Take 1 capsule by mouth 3 times daily for 270 days. , Disp-270 capsule, R-3Normal  Stented coronary artery  -     clopidogrel (PLAVIX) 75 MG tablet; Take 1 tablet by mouth daily, Disp-90 tablet, R-1Normal  Anxiety with depression  -     DULoxetine (CYMBALTA) 60 MG extended release capsule; Take 1 capsule by mouth daily, Disp-90 capsule, R-1Normal  Need for vaccination against Streptococcus pneumoniae  Assessment & Plan:   About half of people who get PPSV have mild side effects, such as redness or pain where the shot is given, which go away within about two days. Less than 1 out of 100 people develop a fever, muscle aches, or more severe local reactions. Need for prophylactic vaccination against diphtheria and tetanus  Assessment & Plan:   Wait one week to get Tetanus. Common side effects of Td vaccination include soreness in the arm where you got the shot and a mild fever. These usually occur within 3 days of the shot and last a short time  Orders:  -     Tdap (ADACEL) 5-2-15.5 LF-MCG/0.5 injection; Inject 0.5 mLs into the muscle once for 1 dose, Disp-0.5 mL, R-0Print  Need for shingles vaccine  Assessment & Plan:  Wait for 1 month to get first shingles vaccine then repeat in 2 months   . shinglesvacc  · A sore arm with mild or moderate pain is very common after recombinant shingles vaccine, affecting about 80% of vaccinated people. Redness and swelling can also happen at the site of the injection. · Tiredness, muscle pain, headache, shivering, fever, stomach pain, and nausea happen after vaccination in more than half of people who receive recombinant shingles vaccine. Orders:  -     zoster recombinant adjuvanted vaccine [facility]) 50 MCG/0.5ML SUSR injection;  Inject 0.5 mLs into the muscle See Admin Instructions 1 dose now and repeat in 2-6 months, Disp-0.5 mL, R-1Print    Declines lung cancer screening. Recommendations for Preventive Services Due: see orders and patient instructions/AVS.  Recommended screening schedule for the next 5-10 years is provided to the patient in written form: see Patient Instructions/AVS.     Return in 5 months (on 11/16/2022) for HTN, hyperlipidemia and Medicare Annual Wellness Visit in 1 year. Subjective   The following acute and/or chronic problems were also addressed today:  She has hyperthyroid and has appt with endocrinology but she has been out of the Tapazole since February, She was having a lot of diarrhea, weight loss initially but now has been stable. Will check labs and then resume medication. Hypertension: Patient here for follow-up of elevated blood pressure. She is exercising and is adherent to low salt diet. Blood pressure is well controlled at home. Cardiac symptoms none. Patient denies chest pain, chest pressure/discomfort, claudication, dyspnea, exertional chest pressure/discomfort, fatigue, irregular heart beat, lower extremity edema, near-syncope, orthopnea, palpitations, paroxysmal nocturnal dyspnea, syncope and tachypnea. Cardiovascular risk factors: advanced age (older than 54 for men, 72 for women), dyslipidemia, hypertension and smoking/ tobacco exposure. Use of agents associated with hypertension: none and Tapazole. History of target organ damage: angina/ prior myocardial infarction.     Hyperlipidemia: Patient presents with hyperlipidemia. She was tested because HTN, hyperlipidemia. Her last labs 3/21/2022 showed Total cholesterol of 153, HDL 40, LDL 76,  Triglycerides 185. She denies chest pain, dyspnea, exertional chest pressure/discomfort, fatigue, feeding intolerance, lower extremity edema, palpitations, poor exercise tolerance, syncope, tachypnea and skin xanthelasma. There is not a family history of hyperlipidemia.  There is not a family history of early ischemia heart disease. She has mitochondrial myopathy and is on Gabapentin 300 mg TID. States she is doing well on this dose. Needs all medications refilled today. Patient's complete Health Risk Assessment and screening values have been reviewed and are found in Flowsheets. The following problems were reviewed today and where indicated follow up appointments were made and/or referrals ordered. Positive Risk Factor Screenings with Interventions:               Opioid Risk: (Low risk score <55) Opioid risk score: 14    Patient is low risk for opioid use disorder or overdose. Last PDMP Zoltan Hoffman as Reviewed: Patient follows with pain management  Review User Review Instant Review Result   Taylor Parham 5/18/2022  1:16 PM Reviewed PDMP [1]     Last Controlled Substance Monitoring Documentation      Office Visit from 5/18/2022 in [facility]   Periodic Controlled Substance Monitoring No signs of potential drug abuse or diversion identified. , Assessed functional status., Obtaining appropriate analgesic effect of treatment. filed at 05/18/2022 [facility] and ACP:  General  In general, how would you say your health is?: Good  In the past 7 days, have you experienced any of the following: New or Increased Pain, New or Increased Fatigue, Loneliness, Social Isolation, Stress or Anger?: No  Do you get the social and emotional support that you need?: Yes  Do you have a Living Will?: Yes    Advance Directives     [facility] ACP-Advance Directive ACP-Power of     Not on File Not on File Not on File Not on File      General Health Risk Interventions:  · No Living Will: ACP documents already completed- patient asked to provide copy to the office              Objective   Vitals:    06/16/22 1559   BP: 128/72   Pulse: 96   Resp: 16   Temp: 96.9 °F (36.1 °C)   SpO2: 98%   Weight: 182 lb 3.2 oz (82.6 kg)   Height: 5' 10.5\" (1.791 m)      Body mass index is 25.77 kg/m². General Appearance: alert and oriented to person, place and time, well developed and well- nourished, in no acute distress  Skin: warm and dry, no rash or erythema  Head: normocephalic and atraumatic  Eyes: pupils equal, round, and reactive to light, extraocular eye movements intact, conjunctivae normal  ENT: tympanic membrane, external ear and ear canal normal bilaterally, nose without deformity, nasal mucosa and turbinates normal without polyps  Neck: supple and non-tender without mass, no thyromegaly or thyroid nodules, no cervical lymphadenopathy  Pulmonary/Chest: clear to auscultation bilaterally- no wheezes, rales or rhonchi, normal air movement, no respiratory distress  Cardiovascular: normal rate, regular rhythm, normal S1 and S2, no murmurs, rubs, clicks, or gallops, distal pulses intact, no carotid bruits  Abdomen: soft, non-tender, non-distended, normal bowel sounds, no masses or organomegaly  Breast: appear normal, no suspicious masses, no skin or nipple changes or axillary nodes  Extremities: no cyanosis, clubbing or edema  Musculoskeletal: decreased ROM in back, tenderness in joints and back,  no joint swelling, deformity   Neurologic: reflexes normal and symmetric, no cranial nerve deficit, gait, coordination and speech normal       Allergies   Allergen Reactions    Iodine Anaphylaxis and Hives    Evista [Raloxifene Hcl] Hives     Prior to Visit Medications    Medication Sig Taking?  Authorizing Provider   clopidogrel (PLAVIX) 75 MG tablet Take 1 tablet by mouth daily Yes Debborah Kocher, APRN - CNP   atorvastatin (LIPITOR) 10 MG tablet Take 1 tablet by mouth daily Yes Debborah Kocher, APRN - CNP   DULoxetine (CYMBALTA) 60 MG extended release capsule Take 1 capsule by mouth daily Yes Debborah Kocher, APRN - CNP   metoprolol succinate (TOPROL XL) 50 MG extended release tablet Take 1 tablet by mouth 2 times daily Yes Debborah Kocher, APRN - CNP   gabapentin (NEURONTIN) 300 MG capsule Take 1 capsule by mouth 3 times daily for 270 days. Yes SEFERINO Bennett CNP   zoster recombinant adjuvanted vaccine [facility]) 50 MCG/0.5ML SUSR injection Inject 0.5 mLs into the muscle See Admin Instructions 1 dose now and repeat in 2-6 months Yes SEFERINO rGay CNP   Tdap (ADACEL) 5-2-15.5 LF-MCG/0.5 injection Inject 0.5 mLs into the muscle once for 1 dose Yes SEFERINO Bennett CNP   cyclobenzaprine (FLEXERIL) 10 mg tablet TAKE 1 TABLET BY MOUTH DAILY Yes SEFERINO Teixeira CNP   lidocaine (LIDODERM) 5 % APPLY 1 PATCH TO SKIN DAILY, 12 HOURS ON AND 12 HOURS OFF Yes SEFERINO Mohamud CNP   HYDROcodone-acetaminophen (NORCO) 7.5-325 MG per tablet Take 0.5 tablets by mouth every 8 hours as needed for Pain for up to 30 days. Intended supply: 30 days Yes SEFERINO Mohamud CNP   Coenzyme Q10 (COQ-10) 100 MG CAPS Take by mouth Yes Historical Provider, MD   Cholecalciferol (VITAMIN D3) 50 MCG (2000 UT) CAPS Take by mouth Yes Historical Provider, MD   methIMAzole (TAPAZOLE) 10 MG tablet Take 1 tablet by mouth 2 times daily  Patient taking differently: Take 10 mg by mouth daily  Yes Susanna Reese MD   celecoxib (CELEBREX) 200 MG capsule Take 1 capsule by mouth daily Yes Susanna Reese MD   Handicap Placard [facility] Until 12/2/2026 Yes Susanna Reese MD       [facility] (Including outside providers/suppliers regularly involved in providing care):   Patient Care Team:  SEFERINO Bennett CNP as PCP - General (Family Nurse Practitioner)  SEFERINO Bennett CNP as PCP - [facility] Empaneled Provider     Reviewed and updated this visit:  Tobacco  Allergies  Meds  Problems  Med Hx  Surg Hx  Soc Hx  Fam Hx                  Advance Care Planning   Advanced Care Planning: Discussed the patients choices for care and treatment in case of a health event that adversely affects decision-making abilities. Also discussed the patients long-term treatment options.  Reviewed with the patient the appropriate state-specific advance directive documents. Reviewed the process of designating a competent adult as an Agent (or -in-fact) that could take make health care decisions for the patient if incompetent. Patient was asked to complete the declaration forms, either acknowledge the forms by a public notary or an eligible witness and provide a signed copy to the practice office. Time spent (minutes): 3 instructed to bring copy to the office for chart    Cardiovascular Disease Risk Counseling: Assessed the patient's risk to develop cardiovascular disease and reviewed main risk factors. Reviewed steps to reduce disease risk including:   · Quitting tobacco use, reducing amount smoked, or not starting the habit  · Making healthy food choices  · Being physically active and gradualy increasing activity levels   · Reduce weight and determine a healthy BMI goal  · Monitor blood pressure and treat if higher than 140/90 mmHg  · Maintain blood total cholesterol levels under 5 mmol/l or 190 mg/dl  · Maintain LDL cholesterol levels under 3.0 mmol/l or 115 mg/dl   · Control blood glucose levels  · Consider taking aspirin (75 mg daily), once blood pressure is controlled   Provided a follow up plan.   Time spent (minutes): 10

## 2022-06-17 DIAGNOSIS — E05.90 HYPERTHYROIDISM: ICD-10-CM

## 2022-06-17 LAB
T3 FREE: 10.1 PG/ML (ref 2–4.4)
T4 FREE: 2.56 NG/DL (ref 0.93–1.7)
TSH SERPL DL<=0.05 MIU/L-ACNC: <0.01 UIU/ML (ref 0.27–4.2)

## 2022-06-20 DIAGNOSIS — E05.90 HYPERTHYROIDISM: Primary | ICD-10-CM

## 2022-06-20 RX ORDER — METHIMAZOLE 10 MG/1
10 TABLET ORAL 2 TIMES DAILY
Qty: 60 TABLET | Refills: 3 | Status: SHIPPED
Start: 2022-06-20 | End: 2022-08-31 | Stop reason: SDUPTHER

## 2022-07-06 ENCOUNTER — OFFICE VISIT (OUTPATIENT)
Dept: PAIN MANAGEMENT | Age: 67
End: 2022-07-06
Payer: MEDICARE

## 2022-07-06 VITALS
SYSTOLIC BLOOD PRESSURE: 142 MMHG | RESPIRATION RATE: 20 BRPM | HEART RATE: 87 BPM | HEIGHT: 71 IN | TEMPERATURE: 97.5 F | BODY MASS INDEX: 24.92 KG/M2 | WEIGHT: 178 LBS | DIASTOLIC BLOOD PRESSURE: 70 MMHG | OXYGEN SATURATION: 97 %

## 2022-07-06 DIAGNOSIS — M54.16 LUMBAR RADICULOPATHY: ICD-10-CM

## 2022-07-06 DIAGNOSIS — M16.11 PRIMARY OSTEOARTHRITIS OF RIGHT HIP: ICD-10-CM

## 2022-07-06 DIAGNOSIS — G89.4 CHRONIC PAIN SYNDROME: ICD-10-CM

## 2022-07-06 DIAGNOSIS — M47.816 LUMBAR SPONDYLOSIS: ICD-10-CM

## 2022-07-06 PROCEDURE — 1036F TOBACCO NON-USER: CPT | Performed by: NURSE PRACTITIONER

## 2022-07-06 PROCEDURE — G8427 DOCREV CUR MEDS BY ELIG CLIN: HCPCS | Performed by: NURSE PRACTITIONER

## 2022-07-06 PROCEDURE — 1123F ACP DISCUSS/DSCN MKR DOCD: CPT | Performed by: NURSE PRACTITIONER

## 2022-07-06 PROCEDURE — 1090F PRES/ABSN URINE INCON ASSESS: CPT | Performed by: NURSE PRACTITIONER

## 2022-07-06 PROCEDURE — 99213 OFFICE O/P EST LOW 20 MIN: CPT | Performed by: NURSE PRACTITIONER

## 2022-07-06 PROCEDURE — 3017F COLORECTAL CA SCREEN DOC REV: CPT | Performed by: NURSE PRACTITIONER

## 2022-07-06 PROCEDURE — G8420 CALC BMI NORM PARAMETERS: HCPCS | Performed by: NURSE PRACTITIONER

## 2022-07-06 PROCEDURE — G8399 PT W/DXA RESULTS DOCUMENT: HCPCS | Performed by: NURSE PRACTITIONER

## 2022-07-06 RX ORDER — HYDROCODONE BITARTRATE AND ACETAMINOPHEN 7.5; 325 MG/1; MG/1
0.5 TABLET ORAL EVERY 8 HOURS PRN
Qty: 45 TABLET | Refills: 0 | Status: SHIPPED | OUTPATIENT
Start: 2022-07-06 | End: 2022-08-18 | Stop reason: SDUPTHER

## 2022-07-06 NOTE — PROGRESS NOTES
Do you currently have any of the following:    Fever: No  Headache:  No  Cough: No  Shortness of breath: No  Exposed to anyone with these symptoms: No                                                                                                                Oseas Burgos presents to the [facility] on 7/6/2022. Sky Elliott is complaining of pain in her lower back. The pain is constant. The pain is described as aching and stabbing. Pain is rated on her best day at a 5, on her worst day at a 10, and on average at a 8 on the VAS scale. She took her last dose of vicodin 5-6 days ago lidocaine patch yesterday, gabapentin yesterday. Sky Elliott does not have issues with constipation. Any procedures since your last visit: No    She is not on NSAIDS and  is  on anticoagulation medications to include Plavix and is managed by Dr. Gale Dockery. Pacemaker or defibrillator: No     Medication Contract and Consent for Opioid Use Documents Filed      No documents found                   BP (!) 142/70   Pulse 87   Temp 97.5 °F (36.4 °C)   Resp 20   Ht 5' 11\" (1.803 m)   Wt 178 lb (80.7 kg)   SpO2 97%   BMI 24.83 kg/m²      No LMP recorded. Patient has had a hysterectomy.

## 2022-07-06 NOTE — PROGRESS NOTES
[address] [facility], [address] [place] [id number]  [phone]    Follow up Note      Clejonathane Cough     Date of Visit:  7/6/2022    CC:  Patient presents for follow up low back     HPI:  Pt is here with significant relief with Norco and addition of Zrlido patches. Pt reports patches improve functionality for most of the day. . Pt reports good days and bad days with low back pain. Appropriate analgesia with current medications regimen:Fair  Change in quality of symptoms:no. Medication side effects:none. Recent diagnostic testing:none  Recent interventional procedures:none     She has been on anticoagulation medications to include Plavix/ASA. The patient  has not been on herbal supplements.  The patient is not diabetic.     Imaging:   Lumbar spine MRI 2021  . Advanced degenerative change with slight grade 1 retrolisthesis at L2-3   and slight grade 1 anterolisthesis at L4-5.  Mild levoscoliosis. 2. Moderate central canal stenosis at L3-4 with right-sided disc protrusion   causing mild right subarticular recess stenosis and moderate right neural   foraminal stenosis. 3. Mild to moderate central canal stenosis at L2-3 with small right-sided   disc herniation causing mild right subarticular recess and neural foraminal   stenosis. 4. Small left-sided disc protrusion at L4-5 causing mild left subarticular   recess stenosis and neural foraminal stenosis.  No significant central canal   stenosis at this level. 5. Subchondral signal change about L2-3 with some some edema most likely   related to severe degenerative disc disease.  Clinical correlation for signs   of infection is suggested.      Right hip Xray 2021:  Mild-to-moderate osteoarthritis at the right hip.     Lumbar spine Xray 2016:  1. Left convex scoliosis. 2. Multilevel degenerative spondylosis throughout the lumbar spine. 3. Facet arthritis from L2-S1.   4. No acute abnormality is identified.    Previous treatments: Physical Therapy and medications. .                             Potential Aberrant Drug-Related Behavior:   No      Urine Drug Screening:  Urine screen 02/2022 showed no opioids which is consistent     OARRS report:  07/2022 consistent     Opioid Agreement:  Renewal date:03/2023        Past Medical History:   Diagnosis Date    Anxiety     CAD (coronary artery disease)     Carotid artery stenosis     Chronic back pain     COPD (chronic obstructive pulmonary disease) (HCC)     Depression     GERD (gastroesophageal reflux disease)     Hyperlipidemia     Hypertension     Hyperthyroidism     Mitochondrial disease ([address]     Neuropathy        Past Surgical History:   Procedure Laterality Date    ABDOMINAL EXPLORATION SURGERY      APPENDECTOMY      CHOLECYSTECTOMY      CORONARY ANGIOPLASTY WITH STENT PLACEMENT  2014    HYSTERECTOMY (CERVIX STATUS UNKNOWN)      NECK SURGERY      PAIN MANAGEMENT PROCEDURE N/A 2/7/2022    LUMBAR EPIDURAL STEROID INJECTION L3-4 WITH 80 DEPO performed by Julio Malhotra MD at [facility] (CERVIX NOT REMOVED)      TONSILLECTOMY         Prior to Admission medications    Medication Sig Start Date End Date Taking? Authorizing Provider   methIMAzole (TAPAZOLE) 10 MG tablet Take 1 tablet by mouth 2 times daily 6/20/22   SEFERINO Valle CNP   clopidogrel (PLAVIX) 75 MG tablet Take 1 tablet by mouth daily 6/16/22   SEFERINO Valle CNP   atorvastatin (LIPITOR) 10 MG tablet Take 1 tablet by mouth daily 6/16/22   SEFERINO Valle CNP   DULoxetine (CYMBALTA) 60 MG extended release capsule Take 1 capsule by mouth daily 6/16/22   SEFERINO Valle CNP   metoprolol succinate (TOPROL XL) 50 MG extended release tablet Take 1 tablet by mouth 2 times daily 6/16/22   SEFERINO Valle CNP   gabapentin (NEURONTIN) 300 MG capsule Take 1 capsule by mouth 3 times daily for 270 days.  6/16/22 3/13/23  SEFERINO Valle Lack of Transportation (Non-Medical): No   Physical Activity: Sufficiently Active    Days of Exercise per Week: 5 days    Minutes of Exercise per Session: 30 min   Stress:     Feeling of Stress : Not on file   Social Connections:     Frequency of Communication with Friends and Family: Not on file    Frequency of Social Gatherings with Friends and Family: Not on file    Attends Baptism Services: Not on file    Active Member of [employer] or Organizations: Not on file    Attends Club or Organization Meetings: Not on file    Marital Status: Not on file   Intimate Partner Violence:     Fear of Current or Ex-Partner: Not on file    Emotionally Abused: Not on file    Physically Abused: Not on file    Sexually Abused: Not on file   Housing Stability:     Unable to Pay for Housing in the Last Year: Not on file    Number of [place] in the Last Year: Not on file    Unstable Housing in the Last Year: Not on file       Family History   Problem Relation Age of Onset    Early Death Mother     Heart Disease Mother    Chester Richr Other Mother 34        blood clot      Colon Cancer Father 80    Diabetes Maternal Uncle     Diabetes Maternal Grandmother        REVIEW OF SYSTEMS:     Austyn Weller denies fever/chills, chest pain, shortness of breath, new bowel or bladder complaints or suicidal ideations. All other review of systems wasnegative. Review of Systems    PHYSICAL EXAMINATION:      General:       General appearance:   pleasant and well-hydrated.    , in moderate discomfort and A & O x3  Build:Normal Weight     HEENT:     Head:normocephalic and atraumatic  Sclera: icterus absent,      Lungs:     Breathing:Breathing Pattern: regular, no distress     Abdomen:     Shape:non-distended and normal  Tenderness:none     Cervical spine:     Inspection:anterior fusion scar      Lumbar spine:     Spine inspection:scoliosis   CVA tenderness:No   Palpation:tenderness paravertebral muscles, facet loading, left, right, positive and tenderness. Range of motion:abnormal moderately Lateral bending, flexion, extension rotation bilateral and is  painful.     Musculoskeletal:     Trigger points in Paraveteral:absent bilaterally  SI joint tenderness:positive right, positive left  Trochanteric bursa tenderness:positive right, positive left  SLR:negative right, negative left, sitting      Extremities:     Tremors:None bilaterally upper and lower  Range of motion:pain with internal rotation of hips positive right  Intact:Yes  Edema:Normal     Neurological:     Sensory:normal to light touch bilateral lower extremities. Motor:                            Right Quadriceps5-/5          Left Quadriceps5-/5           Right Gastrocnemius5-/5    Left Gastrocnemius5-/5  Right Ant Tibialis5-/5  Left Ant Tibialis5-/5  Gait:antalgic     Dermatology:     Skin:no unusual rashes and no skin lesions     Impression:  Low back pain with radiation to the right thigh and groin. Plan:  Follow up on her low back and right hip pain with no acute issues, managed with current medications regimen. Refill Norco 7.5 QD PRN(patient given 45 pills to last 6-8 weeks  Immense relief with Ztlido patches   NO NSAIDS on Plavix  Gabapentin 300 mg TID/Cymbalta 60 mg QD by PCP  Continue with Flexeril 10 mg QD PRN. OARRS report reviewed   Patient encouraged to stay active and to lose weight. Treatment plan discussed with the patient including medications side effects.     We discussed with the patient that combining opioids, benzodiazepines, alcohol, illicit drugs or sleep aids increases the risk of respiratory depression including death. We discussed that these medications may cause drowsiness, sedation or dizziness and have counseled the patient not to drive or operate machinery. We have discussed that these medications will be prescribed only by one provider.  We have discussed with the patient about age related risk factors and have thoroughly discussed the importance of taking these medications as prescribed.  The patient verbalizes understanding.     ccreferring physic

## 2022-08-18 ENCOUNTER — OFFICE VISIT (OUTPATIENT)
Dept: PAIN MANAGEMENT | Age: 67
End: 2022-08-18
Payer: MEDICARE

## 2022-08-18 VITALS
DIASTOLIC BLOOD PRESSURE: 76 MMHG | HEIGHT: 71 IN | TEMPERATURE: 96.7 F | WEIGHT: 178 LBS | SYSTOLIC BLOOD PRESSURE: 124 MMHG | BODY MASS INDEX: 24.92 KG/M2 | OXYGEN SATURATION: 97 % | HEART RATE: 91 BPM

## 2022-08-18 DIAGNOSIS — G89.29 CHRONIC BILATERAL LOW BACK PAIN WITHOUT SCIATICA: ICD-10-CM

## 2022-08-18 DIAGNOSIS — M47.816 LUMBAR FACET ARTHROPATHY: ICD-10-CM

## 2022-08-18 DIAGNOSIS — M16.11 PRIMARY OSTEOARTHRITIS OF RIGHT HIP: ICD-10-CM

## 2022-08-18 DIAGNOSIS — M51.9 LUMBAR DISC DISORDER: ICD-10-CM

## 2022-08-18 DIAGNOSIS — M54.16 LUMBAR RADICULOPATHY: ICD-10-CM

## 2022-08-18 DIAGNOSIS — G89.4 CHRONIC PAIN SYNDROME: Primary | ICD-10-CM

## 2022-08-18 DIAGNOSIS — M47.816 LUMBAR SPONDYLOSIS: ICD-10-CM

## 2022-08-18 DIAGNOSIS — M54.50 CHRONIC BILATERAL LOW BACK PAIN WITHOUT SCIATICA: ICD-10-CM

## 2022-08-18 PROCEDURE — 3017F COLORECTAL CA SCREEN DOC REV: CPT | Performed by: PHYSICIAN ASSISTANT

## 2022-08-18 PROCEDURE — 99213 OFFICE O/P EST LOW 20 MIN: CPT | Performed by: PHYSICIAN ASSISTANT

## 2022-08-18 PROCEDURE — 1090F PRES/ABSN URINE INCON ASSESS: CPT | Performed by: PHYSICIAN ASSISTANT

## 2022-08-18 PROCEDURE — G8427 DOCREV CUR MEDS BY ELIG CLIN: HCPCS | Performed by: PHYSICIAN ASSISTANT

## 2022-08-18 PROCEDURE — G8399 PT W/DXA RESULTS DOCUMENT: HCPCS | Performed by: PHYSICIAN ASSISTANT

## 2022-08-18 PROCEDURE — G8420 CALC BMI NORM PARAMETERS: HCPCS | Performed by: PHYSICIAN ASSISTANT

## 2022-08-18 PROCEDURE — 1036F TOBACCO NON-USER: CPT | Performed by: PHYSICIAN ASSISTANT

## 2022-08-18 PROCEDURE — 1123F ACP DISCUSS/DSCN MKR DOCD: CPT | Performed by: PHYSICIAN ASSISTANT

## 2022-08-18 RX ORDER — HYDROCODONE BITARTRATE AND ACETAMINOPHEN 7.5; 325 MG/1; MG/1
0.5 TABLET ORAL EVERY 8 HOURS PRN
Qty: 45 TABLET | Refills: 0 | Status: SHIPPED
Start: 2022-08-18 | End: 2022-09-29 | Stop reason: SDUPTHER

## 2022-08-18 NOTE — PROGRESS NOTES
[address]  [address] [facility], [address] [place] [address] Jordy  [phone]    Follow up Note      Josefina Close     Date of Visit:  8/18/2022    CC:  Patient presents for follow up   Chief Complaint   Patient presents with    Back Pain       HPI:    Pain is unchanged. No new changes. Appropriate analgesia with current medications regimen: yes. Change in quality of symptoms:no. Medication side effects:none. Recent diagnostic testing:none. Recent interventional procedures:none. .    She has been on anticoagulation medications to include Plavix/ASA. The patient  has not been on herbal supplements. The patient is not diabetic. Imaging:   Lumbar spine MRI 2021  . Advanced degenerative change with slight grade 1 retrolisthesis at L2-3   and slight grade 1 anterolisthesis at L4-5. Mild levoscoliosis. 2. Moderate central canal stenosis at L3-4 with right-sided disc protrusion   causing mild right subarticular recess stenosis and moderate right neural   foraminal stenosis. 3. Mild to moderate central canal stenosis at L2-3 with small right-sided   disc herniation causing mild right subarticular recess and neural foraminal   stenosis. 4. Small left-sided disc protrusion at L4-5 causing mild left subarticular   recess stenosis and neural foraminal stenosis. No significant central canal   stenosis at this level. 5. Subchondral signal change about L2-3 with some some edema most likely   related to severe degenerative disc disease. Clinical correlation for signs   of infection is suggested. Right hip Xray 2021:  Mild-to-moderate osteoarthritis at the right hip. Lumbar spine Xray 2016:  1. Left convex scoliosis. 2. Multilevel degenerative spondylosis throughout the lumbar spine. 3. Facet arthritis from L2-S1.   4. No acute abnormality is identified. Previous treatments: Physical Therapy and medications. .                            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 Authorizing Provider   methIMAzole (TAPAZOLE) 10 MG tablet Take 1 tablet by mouth 2 times daily 6/20/22  Yes SEFERINO Crowe CNP   clopidogrel (PLAVIX) 75 MG tablet Take 1 tablet by mouth daily 6/16/22  Yes SEFERINO Crowe CNP   atorvastatin (LIPITOR) 10 MG tablet Take 1 tablet by mouth daily 6/16/22  Yes SEFERINO Crowe CNP   DULoxetine (CYMBALTA) 60 MG extended release capsule Take 1 capsule by mouth daily 6/16/22  Yes SEFERINO Crowe CNP   metoprolol succinate (TOPROL XL) 50 MG extended release tablet Take 1 tablet by mouth 2 times daily 6/16/22  Yes SEFERINO Stallings CNP   gabapentin (NEURONTIN) 300 MG capsule Take 1 capsule by mouth 3 times daily for 270 days.  6/16/22 3/13/23 Yes SEFERINO Stallings CNP   zoster recombinant adjuvanted vaccine [facility]) 50 MCG/0.5ML SUSR injection Inject 0.5 mLs into the muscle See Admin Instructions 1 dose now and repeat in 2-6 months 22 Yes SEFERINO Hartmann CNP   cyclobenzaprine (FLEXERIL) 10 mg tablet TAKE 1 TABLET BY MOUTH DAILY 22  Yes SEFERINO Hartmann CNP   lidocaine (LIDODERM) 5 % APPLY 1 PATCH TO SKIN DAILY, 12 HOURS ON AND 12 HOURS OFF 22  Yes SEFERINO Cavazos CNP   Coenzyme Q10 (COQ-10) 100 MG CAPS Take by mouth   Yes Historical Provider, MD   Cholecalciferol (VITAMIN D3) 50 MCG (2000) CAPS Take by mouth   Yes Historical Provider, MD   Handmelchor Goldman MISC Until 2026  Yes Bri Li MD       Allergies   Allergen Reactions    Iodine Anaphylaxis and Hives    Evista [Raloxifene Hcl] Hives       Social History     Socioeconomic History    Marital status:      Spouse name: Not on file    Number of children: Not on file    Years of education: Not on file    Highest education level: Not on file   Occupational History    Not on file   Tobacco Use    Smoking status: Former     Packs/day: 1.00     Years: 27.00     Pack years: 27.00     Types: Cigarettes     Quit date:      Years since quittin.6    Smokeless tobacco: Never   Vaping Use    Vaping Use: Never used   Substance and Sexual Activity    Alcohol use: No    Drug use: No    Sexual activity: Not Currently   Other Topics Concern    Not on file   Social History Narrative    Not on file     Social Determinants of Health     Financial Resource Strain: Low Risk     Difficulty of Paying Living Expenses: Not hard at all   Food Insecurity: No Food Insecurity    Worried About [address] in the Last Year: Never true    [address] in the Last Year: Never true   Transportation Needs: No Transportation Needs    Lack of Transportation (Medical): No    Lack of Transportation (Non-Medical):  No   Physical Activity: Sufficiently Active    Days of Exercise per Week: 5 days    Minutes of Exercise per Session: 30 min   Stress: Not on file   Social Connections: Not on file   Intimate Partner Violence: Not on file   Housing Stability: Not on file       Family History   Problem Relation Age of Onset    Early Death Mother     Heart Disease Mother     Other Mother 34        blood clot      Colon Cancer Father 80    Diabetes Maternal Uncle     Diabetes Maternal Grandmother        REVIEW OF SYSTEMS:     Paul King denies fever/chills, chest pain, shortness of breath, new bowel or bladder complaints. All other review of systems was negative. PHYSICAL EXAMINATION:      /76   Pulse 91   Temp (!) 96.7 °F (35.9 °C) (Infrared)   Ht 5' 11\" (1.803 m)   Wt 178 lb (80.7 kg)   SpO2 97%   BMI 24.83 kg/m²     General:      General appearance:   pleasant and well-hydrated. , in mild discomfort and A & O x3  Build:Normal    HEENT:    Head:normocephalic and atraumatic  Sclera: icterus absent,    Lungs:    Breathing:Normal expansion. No wheezing. Abdomen:    Shape:non-distended and normal    Lumbar spine:    Range of motion:abnormal mildly Lateral bending, flexion, extension rotation bilateral and is  painful. Extremities:    Tremors:None bilaterally upper and lower  Range of motion:Generally normal shoulders, pain with internal rotation of hips not done. Intact:Yes  Edema:Normal    Neurological:    [address]    Dermatology:    Skin:no unusual rashes, no skin lesions, no palpable subcutaneous nodules, and good skin turgor    Impression:    Low back pain with radiation to the right thigh and groin. Plan:  Follow up on her low back and right hip pain with no acute issues, managed with current medications regimen. Refill Norco 7.5 QD PRN(patient given 45 pills to last 6-8 weeks  Immense relief with Ztlido patches  NO NSAIDS on Plavix  Gabapentin 300 mg TID/Cymbalta 60 mg QD by PCP  Continue with Flexeril 10 mg QD PRN. OARRS report reviewed   Patient encouraged to stay active and to lose weight.   Treatment plan discussed with the patient including medications side effects. Controlled Substance Monitoring:    Acute and Chronic Pain Monitoring:   RX Monitoring 8/18/2022   Periodic Controlled Substance Monitoring Possible medication side effects, risk of tolerance/dependence & alternative treatments discussed. ;No signs of potential drug abuse or diversion identified. ;Assessed functional status. ;Obtaining appropriate analgesic effect of treatment. We discussed with the patient that combining opioids, benzodiazepines, alcohol, illicit drugs or sleep aids increases the risk of respiratory depression including death. We discussed that these medications may cause drowsiness, sedation or dizziness and have counseled the patient not to drive or operate machinery. We have discussed that these medications will be prescribed only by one provider. We have discussed with the patient about age related risk factors and have thoroughly discussed the importance of taking these medications as prescribed. The patient verbalizes understanding.     ccrefcathying physic

## 2022-08-18 NOTE — PROGRESS NOTES
Do you currently have any of the following:    Fever: No  Headache:  No  Cough: No  Shortness of breath: No  Exposed to anyone with these symptoms: No                                                                                                                Mathews Osgood presents to the [facility] on 8/18/2022. Em Coe is complaining of pain in back. The pain is constant. The pain is described as aching. Pain is rated on her best day at a 5, on her worst day at a 10, and on average at a 8 on the VAS scale. She took her last dose of Norco last night. Em Coe does not have issues with constipation. Any procedures since your last visit: No.    She is not on NSAIDS and  is  on anticoagulation medications to include Plavix and is managed by SEFERINO Fagan - CNP  . Pacemaker or defibrillator: No.    Medication Contract and Consent for Opioid Use Documents Filed        No documents found                       Ht 5' 11\" (1.803 m)   Wt 178 lb (80.7 kg)   BMI 24.83 kg/m²      No LMP recorded. Patient has had a hysterectomy.

## 2022-08-26 DIAGNOSIS — G71.3 MITOCHONDRIAL MYOPATHY: ICD-10-CM

## 2022-08-26 RX ORDER — CYCLOBENZAPRINE HCL 10 MG
10 TABLET ORAL DAILY
Qty: 30 TABLET | Refills: 0 | Status: SHIPPED
Start: 2022-08-26 | End: 2022-09-29 | Stop reason: SDUPTHER

## 2022-08-31 ENCOUNTER — OFFICE VISIT (OUTPATIENT)
Dept: ENDOCRINOLOGY | Age: 67
End: 2022-08-31
Payer: MEDICARE

## 2022-08-31 VITALS
WEIGHT: 179 LBS | OXYGEN SATURATION: 98 % | DIASTOLIC BLOOD PRESSURE: 79 MMHG | RESPIRATION RATE: 18 BRPM | BODY MASS INDEX: 25.06 KG/M2 | SYSTOLIC BLOOD PRESSURE: 146 MMHG | HEART RATE: 91 BPM | HEIGHT: 71 IN

## 2022-08-31 DIAGNOSIS — E05.90 HYPERTHYROIDISM: ICD-10-CM

## 2022-08-31 DIAGNOSIS — E05.00 GRAVES' DISEASE: Primary | ICD-10-CM

## 2022-08-31 DIAGNOSIS — E55.9 VITAMIN D DEFICIENCY: ICD-10-CM

## 2022-08-31 DIAGNOSIS — E05.00 GRAVES' DISEASE: ICD-10-CM

## 2022-08-31 LAB
ALBUMIN SERPL-MCNC: 4.3 G/DL (ref 3.5–5.2)
ALP BLD-CCNC: 148 U/L (ref 35–104)
ALT SERPL-CCNC: 24 U/L (ref 0–32)
AST SERPL-CCNC: 20 U/L (ref 0–31)
BILIRUB SERPL-MCNC: 0.4 MG/DL (ref 0–1.2)
BILIRUBIN DIRECT: <0.2 MG/DL (ref 0–0.3)
BILIRUBIN, INDIRECT: ABNORMAL MG/DL (ref 0–1)
HCT VFR BLD CALC: 47.4 % (ref 34–48)
HEMOGLOBIN: 15.5 G/DL (ref 11.5–15.5)
MCH RBC QN AUTO: 29.5 PG (ref 26–35)
MCHC RBC AUTO-ENTMCNC: 32.7 % (ref 32–34.5)
MCV RBC AUTO: 90.3 FL (ref 80–99.9)
PDW BLD-RTO: 13.1 FL (ref 11.5–15)
PLATELET # BLD: 216 E9/L (ref 130–450)
PMV BLD AUTO: 10.2 FL (ref 7–12)
RBC # BLD: 5.25 E12/L (ref 3.5–5.5)
T4 FREE: 3.1 NG/DL (ref 0.93–1.7)
TOTAL PROTEIN: 7.2 G/DL (ref 6.4–8.3)
TSH SERPL DL<=0.05 MIU/L-ACNC: <0.01 UIU/ML (ref 0.27–4.2)
WBC # BLD: 4.9 E9/L (ref 4.5–11.5)

## 2022-08-31 PROCEDURE — G8399 PT W/DXA RESULTS DOCUMENT: HCPCS | Performed by: INTERNAL MEDICINE

## 2022-08-31 PROCEDURE — 1090F PRES/ABSN URINE INCON ASSESS: CPT | Performed by: INTERNAL MEDICINE

## 2022-08-31 PROCEDURE — G8420 CALC BMI NORM PARAMETERS: HCPCS | Performed by: INTERNAL MEDICINE

## 2022-08-31 PROCEDURE — 3017F COLORECTAL CA SCREEN DOC REV: CPT | Performed by: INTERNAL MEDICINE

## 2022-08-31 PROCEDURE — G8427 DOCREV CUR MEDS BY ELIG CLIN: HCPCS | Performed by: INTERNAL MEDICINE

## 2022-08-31 PROCEDURE — 1123F ACP DISCUSS/DSCN MKR DOCD: CPT | Performed by: INTERNAL MEDICINE

## 2022-08-31 PROCEDURE — 99204 OFFICE O/P NEW MOD 45 MIN: CPT | Performed by: INTERNAL MEDICINE

## 2022-08-31 PROCEDURE — 1036F TOBACCO NON-USER: CPT | Performed by: INTERNAL MEDICINE

## 2022-08-31 RX ORDER — METHIMAZOLE 10 MG/1
10 TABLET ORAL 2 TIMES DAILY
Qty: 60 TABLET | Refills: 5 | Status: SHIPPED | OUTPATIENT
Start: 2022-08-31

## 2022-08-31 NOTE — PROGRESS NOTES
[address] [place] [address] Department of Endocrinology Diabetes and Metabolism   [address] [facility] [address]   Phone: [phone]  Fax: [phone]    Date of Service: 8/31/2022  Primary Care Physician: SEFERINO Jama - CNP  Referring physician: SEFERINO Real - *  Provider: Lula Mackenzie MD    Reason for the visit:  Hyperthyroidism    History of Present Illness: The history is provided by the patient. No  was used. Accuracy of the patient data is excellent. Alexey Mane is a very pleasant 69 y.o. female seen today for evaluation and management of hyperthyroidism     Alexey Mane was diagnosed with hyperthyroidism in 2/2021 and currently on methimazole 10  mg daily     Last lab in 6/2022 showed high TFT   Lab Results   Component Value Date/Time    TSH <0.010 (L) 08/31/2022 11:31 AM    T4FREE 3.10 (H) 08/31/2022 11:31 AM    FT3 10.1 (H) 06/17/2022 12:37 PM    FT3 4.7 (H) 03/21/2022 11:25 AM    FT3 3.7 01/03/2022 11:10 AM    FT3 9.1 (H) 11/29/2021 02:20 PM    TPOABS 5.7 03/21/2022 11:25 AM     Patient denied any history of  palpitations, swelling in the area of the thyroid gland, weight loss, change in appetite, nervousness, anxiety, irritability, tremor, sweating, heat intolerance, changes in bowel habits, muscle weakness or difficulty sleeping. Patient also denied any h/o unexplained weight gain, new fatigue, increased sensitivity to cold, dry skin, brittle fingernails, brittle hair, facial swelling/puffiness, or depressed mood.       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 Her smoking use included cigarettes. She has a 27.00 pack-year smoking history. She has never used smokeless tobacco.  Alcohol:   reports no history of alcohol use. Drugs:   reports no history of drug use. FAMILY HISTORY   Family History   Problem Relation Age of Onset    Early Death Mother     Heart Disease Mother     Other Mother 34        blood clot      Colon Cancer Father 80    Diabetes Maternal Uncle     Diabetes Maternal Grandmother        ALLERGIES AND DRUG REACTIONS   Allergies   Allergen Reactions    Iodine Anaphylaxis and Hives    Evista [Raloxifene Hcl] Hives       CURRENT MEDICATIONS   Current Outpatient Medications   Medication Sig Dispense Refill    methIMAzole (TAPAZOLE) 10 MG tablet Take 1 tablet by mouth 2 times daily 60 tablet 5    cyclobenzaprine (FLEXERIL) 10 MG tablet TAKE 1 TABLET BY MOUTH DAILY 30 tablet 0    HYDROcodone-acetaminophen (NORCO) 7.5-325 MG per tablet Take 0.5 tablets by mouth every 8 hours as needed for Pain for up to 30 days.  Intended supply: 30 days 45 tablet 0    clopidogrel (PLAVIX) 75 MG tablet Take 1 tablet by mouth daily 90 tablet 1    atorvastatin (LIPITOR) 10 MG tablet Take 1 tablet by mouth daily 90 tablet 1    DULoxetine (CYMBALTA) 60 MG extended release capsule Take 1 capsule by mouth daily 90 capsule 1    metoprolol succinate (TOPROL XL) 50 MG extended release tablet Take 1 tablet by mouth 2 times daily 180 tablet 1    gabapentin (NEURONTIN) 300 MG capsule Take 1 capsule by mouth 3 times daily for 270 days. 270 capsule 3    zoster recombinant adjuvanted vaccine (SHINGRIX) 50 MCG/0.5ML SUSR injection Inject 0.5 mLs into the muscle See Admin Instructions 1 dose now and repeat in 2-6 months 0.5 mL 1    lidocaine (LIDODERM) 5 % APPLY 1 PATCH TO SKIN DAILY, 12 HOURS ON AND 12 HOURS OFF 90 patch 0    Coenzyme Q10 (COQ-10) 100 MG CAPS Take by mouth      Cholecalciferol (VITAMIN D3) 50 MCG (2000 UT) CAPS Take by mouth      [facility]26 1 each 0     No current facility-administered medications for this visit. Review of Systems  Constitutional: No fever, no chills, no diaphoresis, no generalized weakness. HEENT: No blurred vision, No sore throat, no ear pain, no hair loss  Neck: denied any neck swelling, difficulty swallowing,   Cadrdiopulomary: No CP, SOB or palpitation, No orthopnea or PND. No cough or wheezing. GI: No N/V/D, no constipation, No abdominal pain, no melena or hematochezia   : Denied any dysuria, hematuria, flank pain, discharge, or incontinence. Skin: denied any rash, ulcer, Hirsute, or hyperpigmentation. MSK: denied any joint deformity, joint pain/swelling, muscle pain, or back pain. Neuro: no numbess, no tingling, no weakness,     OBJECTIVE    BP (!) 146/79   Pulse 91   Resp 18   Ht 5' 11\" (1.803 m)   Wt 179 lb (81.2 kg)   SpO2 98%   BMI 24.97 kg/m²   BP Readings from Last 4 Encounters:   08/31/22 (!) 146/79   08/18/22 124/76   07/06/22 (!) 142/70   06/16/22 128/72     Wt Readings from Last 6 Encounters:   08/31/22 179 lb (81.2 kg)   08/18/22 178 lb (80.7 kg)   07/06/22 178 lb (80.7 kg)   06/16/22 182 lb 3.2 oz (82.6 kg)   05/18/22 178 lb (80.7 kg)   03/23/22 180 lb (81.6 kg)       Physical examination:  General: awake alert, oriented x3, no abnormal position or movements.    HEENT: normocephalic non traumatic, no exophthalmos, no lid lag, no lid retraction   Neck: supple, no LN enlargement, no thyromegaly, no thyroid tenderness, no thyroid bruit, no JVD. Pulm: Clear equal air entry no added sounds, no wheezing or rhonchi    CVS: S1 + S2, no murmur, no heave. Dorsalis pedis pulse palpable   Abd: soft lax, no tenderness, no organomegaly, audible bowel sounds. Skin: warm, no lesions, no rash.  No palmar erythema, no onycholysis, no pretibial Myxoedema, no acropachy   Musculoskeletal: No back tenderness, no kyphosis/scoliosis    Neuro: CN intact, sensation notmal , muscle power normal. No tremors   Psych: normal mood, and affect    Review of Laboratory Data:  I have reviewed the following:  Lab Results   Component Value Date/Time    WBC 4.9 08/31/2022 11:31 AM    RBC 5.25 08/31/2022 11:31 AM    HGB 15.5 08/31/2022 11:31 AM    HCT 47.4 08/31/2022 11:31 AM    MCV 90.3 08/31/2022 11:31 AM    MCH 29.5 08/31/2022 11:31 AM    MCHC 32.7 08/31/2022 11:31 AM    RDW 13.1 08/31/2022 11:31 AM     08/31/2022 11:31 AM    MPV 10.2 08/31/2022 11:31 AM      Lab Results   Component Value Date/Time     03/21/2022 11:25 AM    K 5.0 03/21/2022 11:25 AM    CO2 25 03/21/2022 11:25 AM    BUN 13 03/21/2022 11:25 AM    CREATININE 0.6 03/21/2022 11:25 AM    CALCIUM 9.4 03/21/2022 11:25 AM    LABGLOM >60 03/21/2022 11:25 AM    GFRAA >60 03/21/2022 11:25 AM      Lab Results   Component Value Date/Time    TSH <0.010 (L) 08/31/2022 11:31 AM    T4FREE 3.10 (H) 08/31/2022 11:31 AM    FT3 10.1 (H) 06/17/2022 12:37 PM    FT3 4.7 (H) 03/21/2022 11:25 AM    FT3 3.7 01/03/2022 11:10 AM    FT3 9.1 (H) 11/29/2021 02:20 PM    TPOABS 5.7 03/21/2022 11:25 AM     Lab Results   Component Value Date/Time    LABA1C 5.6 11/29/2021 02:20 PM    GLUCOSE 107 03/21/2022 11:25 AM    GLUCOSE 97 02/13/2012 10:20 AM     Lab Results   Component Value Date/Time    TRIG 185 03/21/2022 11:25 AM    HDL 40 03/21/2022 11:25 AM    LDLCALC 76 03/21/2022 11:25 AM    CHOL 153 03/21/2022 11:25 AM     Lab Results   Component Value Date/Time    VITD25 30 11/11/2019 12:00 AM ASSESSMENT & RECOMMENDATIONS   Mathews Osgood, a 69 y.o.-old female seen in for management of following issues      Hyperthyroidism  Likely due to graves disease   Change Methimazole to 10 mg BID   Check TFT now and again in 6-8 weeks  Thyroid take and scan 9/2021 was consistent with gravs disease  If thyroid level remained controlled with MMT, will likely proceed with MARADIAGA ablation   Reviewed potential side effects of Methimazole, including agranulocytosis and liver dysfunction (cholestasis). Bone health/vitD deficiency    Discussed the effect of hyperthyroidism on bone health  Encourage taking vitD 2000 iu/day over the counter    I personally reviewed external notes from PCP and other patient's care team providers, and personally interpreted labs associated with the above diagnosis. I also ordered labs to further assess and manage the above addressed medical conditions. Return in about 3 months (around 11/30/2022) for Hyperthyroidism, VitD deficiency. The above issues were reviewed with the patient who understood and agreed with the plan. Thank you for allowing us to participate in the care of this patient. Please do not hesitate to contact us with any additional questions. Diagnosis Orders   1. Graves' disease  T4, Free    TSH    Thyroid AB    Thyroid Stimulating Immunoglobulin    CBC    Hepatic Function Panel      2. Hyperthyroidism  methIMAzole (TAPAZOLE) 10 MG tablet      3. Vitamin D deficiency          Pj Huynh MD  Endocrinologist, [facility] End[facility]   [address] [place] [address]   Phone: [phone]  Fax: [phone]  -------------------------  An electronic signature was used to authenticate this note.  Mary Davila MD on 8/31/2022 at 7:51 PM

## 2022-09-01 ENCOUNTER — TELEPHONE (OUTPATIENT)
Dept: ENDOCRINOLOGY | Age: 67
End: 2022-09-01

## 2022-09-01 DIAGNOSIS — E05.90 HYPERTHYROIDISM: Primary | ICD-10-CM

## 2022-09-01 NOTE — TELEPHONE ENCOUNTER
Notify pt,  I have reviewed your recent results    Your thyroid level was very high.  Please take Methimazole 10 mg BID as we discussed at your recent OV and recheck level next Thursday

## 2022-09-03 LAB
THYROGLOBULIN ANTIBODY: 12 IU/ML (ref 0–40)
THYROID PEROXIDASE (TPO) ABS: 11 IU/ML (ref 0–25)

## 2022-09-04 LAB — THYROID STIMULATING IMMUNOGLOBULIN: 3.84 IU/L

## 2022-09-08 DIAGNOSIS — E05.90 HYPERTHYROIDISM: ICD-10-CM

## 2022-09-09 ENCOUNTER — TELEPHONE (OUTPATIENT)
Dept: ENDOCRINOLOGY | Age: 67
End: 2022-09-09

## 2022-09-09 DIAGNOSIS — E05.90 HYPERTHYROIDISM: Primary | ICD-10-CM

## 2022-09-09 LAB
T3 TOTAL: 231.4 NG/DL (ref 80–200)
T4 FREE: 2.28 NG/DL (ref 0.93–1.7)
T4 TOTAL: 11.8 MCG/DL (ref 4.5–11.7)

## 2022-09-09 NOTE — TELEPHONE ENCOUNTER
Notify pt,  I have reviewed your recent results    Thyroid hormones continue to improve     I recommend continue current dose of Methimazole and recheck level again in 2 weeks

## 2022-09-25 DIAGNOSIS — G71.3 MITOCHONDRIAL MYOPATHY: ICD-10-CM

## 2022-09-26 RX ORDER — CYCLOBENZAPRINE HCL 10 MG
10 TABLET ORAL DAILY
Qty: 30 TABLET | Refills: 0 | OUTPATIENT
Start: 2022-09-26

## 2022-09-28 NOTE — PROGRESS NOTES
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 Authorizing Provider   methIMAzole (TAPAZOLE) 10 MG tablet Take 1 tablet by mouth 2 times daily 8/31/22  Yes Cat Zhang MD   cyclobenzaprine (FLEXERIL) 10 MG tablet TAKE 1 TABLET BY MOUTH DAILY 8/26/22  Yes Nat Rubinstein, MD   clopidogrel (PLAVIX) 75 MG tablet Take 1 tablet by mouth daily 6/16/22  Yes SEFERINO Lozada CNP   atorvastatin (LIPITOR) 10 MG tablet Take 1 tablet by mouth daily 6/16/22  Yes SEFERINO Lozada CNP   DULoxetine (CYMBALTA) 60 MG extended release capsule Take 1 capsule by mouth daily 6/16/22  Yes SEFERINO Lozada CNP   metoprolol succinate (TOPROL XL) 50 MG extended release tablet Take 1 tablet by mouth 2 times daily 6/16/22  Yes SEFERINO Anna CNP   gabapentin (NEURONTIN) 300 MG capsule Take 1 capsule by mouth 3 times daily for 270 days.  6/16/22 3/13/23 Yes Marilynn FRIEDMAN SEFERINO Merino CNP   zoster recombinant adjuvanted vaccine [facility]) 50 MCG/0.5ML SUSR injection Inject 0.5 mLs into the muscle See Admin Instructions 1 dose now and repeat in 2-6 months 22 Yes SEFERINO Rodas CNP   lidocaine (LIDODERM) 5 % APPLY 1 PATCH TO SKIN DAILY, 12 HOURS ON AND 12 HOURS OFF 22  Yes SEFERINO Ac CNP   Coenzyme Q10 (COQ-10) 100 MG CAPS Take by mouth   Yes Historical Provider, MD   Cholecalciferol (VITAMIN D3) 50 MCG ( [place]) CAPS Take by mouth   Yes Historical Provider, MD   Handmelchor Goldman MISC Until 2026  Yes Shlomo Smith MD       Allergies   Allergen Reactions    Iodine Anaphylaxis and Hives    Evista [Raloxifene Hcl] Hives       Social History     Socioeconomic History    Marital status:      Spouse name: Not on file    Number of children: Not on file    Years of education: Not on file    Highest education level: Not on file   Occupational History    Not on file   Tobacco Use    Smoking status: Former     Packs/day: 1.00     Years: 27.00     Pack years: 27.00     Types: Cigarettes     Quit date:      Years since quittin.    Smokeless tobacco: Never   Vaping Use    Vaping Use: Never used   Substance and Sexual Activity    Alcohol use: No    Drug use: No    Sexual activity: Not Currently   Other Topics Concern    Not on file   Social History Narrative    Not on file     Social Determinants of Health     Financial Resource Strain: Low Risk     Difficulty of Paying Living Expenses: Not hard at all   Food Insecurity: No Food Insecurity    Worried About [address] in the Last Year: Never true    [address] in the Last Year: Never true   Transportation Needs: No Transportation Needs    Lack of Transportation (Medical): No    Lack of Transportation (Non-Medical):  No   Physical Activity: Sufficiently Active    Days of Exercise per Week: 5 days    Minutes of Exercise per Session: 30 min   Stress: Not on file Social Connections: Not on file   Intimate Partner Violence: Not on file   Housing Stability: Not on file       Family History   Problem Relation Age of Onset    Early Death Mother     Heart Disease Mother     Other Mother 34        blood clot      Colon Cancer Father 80    Diabetes Maternal Uncle     Diabetes Maternal Grandmother        REVIEW OF SYSTEMS:     Noé Reason denies fever/chills, chest pain, shortness of breath, new bowel or bladder complaints. All other review of systems was negative. PHYSICAL EXAMINATION:      /72   Pulse 78   Temp 97.3 °F (36.3 °C) (Infrared)   Ht 5' 11\" (1.803 m)   Wt 179 lb (81.2 kg)   SpO2 97%   BMI 24.97 kg/m²     General:      General appearance:   pleasant and well-hydrated. , in mild discomfort and A & O x3  Build:Normal    HEENT:    Head:normocephalic and atraumatic  Sclera: icterus absent,    Lungs:    Breathing:Normal expansion. No wheezing. Abdomen:    Shape:non-distended and normal    Lumbar spine:    Range of motion:abnormal mildly Lateral bending, flexion, extension rotation bilateral and is  painful. Extremities:    Tremors:None bilaterally upper and lower  Range of motion:Generally normal shoulders, pain with internal rotation of hips not done. Intact:Yes  Edema:Normal    Neurological:    [address]    Dermatology:    Skin:no unusual rashes, no skin lesions, no palpable subcutaneous nodules, and good skin turgor    Impression:    Low back pain with radiation to the right thigh and groin. Plan:  Follow up on her low back and right hip pain with no acute issues, managed with current medications regimen. Refill Norco 7.5 QD PRN(patient given 45 pills to last 6-8 weeks  Immense relief with Ztlido patches  NO NSAIDS on Plavix  Gabapentin 300 mg TID/Cymbalta 60 mg QD by PCP  Continue with Flexeril 10 mg QD PRN. OARRS report reviewed   Patient encouraged to stay active and to lose weight.   Treatment plan discussed with the patient including medications side effects. Controlled Substance Monitoring:    Acute and Chronic Pain Monitoring:   RX Monitoring 9/29/2022   Periodic Controlled Substance Monitoring Possible medication side effects, risk of tolerance/dependence & alternative treatments discussed. ;No signs of potential drug abuse or diversion identified. ;Assessed functional status. ;Obtaining appropriate analgesic effect of treatment. We discussed with the patient that combining opioids, benzodiazepines, alcohol, illicit drugs or sleep aids increases the risk of respiratory depression including death. We discussed that these medications may cause drowsiness, sedation or dizziness and have counseled the patient not to drive or operate machinery. We have discussed that these medications will be prescribed only by one provider. We have discussed with the patient about age related risk factors and have thoroughly discussed the importance of taking these medications as prescribed. The patient verbalizes understanding.     ccreferring physic

## 2022-09-29 ENCOUNTER — OFFICE VISIT (OUTPATIENT)
Dept: PAIN MANAGEMENT | Age: 67
End: 2022-09-29
Payer: MEDICARE

## 2022-09-29 VITALS
HEIGHT: 71 IN | OXYGEN SATURATION: 97 % | WEIGHT: 179 LBS | BODY MASS INDEX: 25.06 KG/M2 | SYSTOLIC BLOOD PRESSURE: 128 MMHG | TEMPERATURE: 97.3 F | DIASTOLIC BLOOD PRESSURE: 72 MMHG | HEART RATE: 78 BPM

## 2022-09-29 DIAGNOSIS — M54.16 LUMBAR RADICULOPATHY: Primary | ICD-10-CM

## 2022-09-29 DIAGNOSIS — M51.9 LUMBAR DISC DISORDER: ICD-10-CM

## 2022-09-29 DIAGNOSIS — M16.11 PRIMARY OSTEOARTHRITIS OF RIGHT HIP: ICD-10-CM

## 2022-09-29 DIAGNOSIS — G89.4 CHRONIC PAIN SYNDROME: ICD-10-CM

## 2022-09-29 DIAGNOSIS — G89.29 CHRONIC BILATERAL LOW BACK PAIN WITHOUT SCIATICA: ICD-10-CM

## 2022-09-29 DIAGNOSIS — M54.50 CHRONIC BILATERAL LOW BACK PAIN WITHOUT SCIATICA: ICD-10-CM

## 2022-09-29 DIAGNOSIS — M47.816 LUMBAR SPONDYLOSIS: ICD-10-CM

## 2022-09-29 DIAGNOSIS — M47.816 LUMBAR FACET ARTHROPATHY: ICD-10-CM

## 2022-09-29 DIAGNOSIS — G71.3 MITOCHONDRIAL MYOPATHY: ICD-10-CM

## 2022-09-29 PROCEDURE — G8420 CALC BMI NORM PARAMETERS: HCPCS | Performed by: PHYSICIAN ASSISTANT

## 2022-09-29 PROCEDURE — 3017F COLORECTAL CA SCREEN DOC REV: CPT | Performed by: PHYSICIAN ASSISTANT

## 2022-09-29 PROCEDURE — 1090F PRES/ABSN URINE INCON ASSESS: CPT | Performed by: PHYSICIAN ASSISTANT

## 2022-09-29 PROCEDURE — 99213 OFFICE O/P EST LOW 20 MIN: CPT

## 2022-09-29 PROCEDURE — G8399 PT W/DXA RESULTS DOCUMENT: HCPCS | Performed by: PHYSICIAN ASSISTANT

## 2022-09-29 PROCEDURE — G8427 DOCREV CUR MEDS BY ELIG CLIN: HCPCS | Performed by: PHYSICIAN ASSISTANT

## 2022-09-29 PROCEDURE — 1123F ACP DISCUSS/DSCN MKR DOCD: CPT | Performed by: PHYSICIAN ASSISTANT

## 2022-09-29 PROCEDURE — 99213 OFFICE O/P EST LOW 20 MIN: CPT | Performed by: PHYSICIAN ASSISTANT

## 2022-09-29 PROCEDURE — 1036F TOBACCO NON-USER: CPT | Performed by: PHYSICIAN ASSISTANT

## 2022-09-29 RX ORDER — CYCLOBENZAPRINE HCL 10 MG
10 TABLET ORAL DAILY
Qty: 30 TABLET | Refills: 0 | Status: SHIPPED | OUTPATIENT
Start: 2022-09-29

## 2022-09-29 RX ORDER — HYDROCODONE BITARTRATE AND ACETAMINOPHEN 7.5; 325 MG/1; MG/1
0.5 TABLET ORAL EVERY 8 HOURS PRN
Qty: 45 TABLET | Refills: 0 | Status: SHIPPED | OUTPATIENT
Start: 2022-09-29 | End: 2022-10-29

## 2022-09-29 NOTE — PROGRESS NOTES
Do you currently have any of the following:    Fever: No  Headache:  No  Cough: No  Shortness of breath: No  Exposed to anyone with these symptoms: No                                                                                                                Xi Morse presents to the [address] [address] on 9/29/2022. Magali Encarnacion is complaining of pain in back. The pain is constant. The pain is described as nagging. Pain is rated on her best day at a 5, on her worst day at a 10, and on average at a 5 on the VAS scale. She took her last dose of Norco last night. Magali Encarnacion does not have issues with constipation. Any procedures since your last visit: No.    She is  on NSAIDS and  is  on anticoagulation medications to include Plavix and is managed by SEFERINO Rodriguez CNP  . Pacemaker or defibrillator: No.    Medication Contract and Consent for Opioid Use Documents Filed        No documents found                       Temp 97.3 °F (36.3 °C) (Infrared)   Ht 5' 11\" (1.803 m)   Wt 179 lb (81.2 kg)   BMI 24.97 kg/m²      No LMP recorded. Patient has had a hysterectomy.

## 2022-09-30 ENCOUNTER — TELEPHONE (OUTPATIENT)
Dept: ENDOCRINOLOGY | Age: 67
End: 2022-09-30

## 2022-09-30 DIAGNOSIS — E05.90 HYPERTHYROIDISM: Primary | ICD-10-CM

## 2022-09-30 NOTE — TELEPHONE ENCOUNTER
Endocrine staff,   Please notify pt that I have reviewed your recent results.      Thyroid level is much better, slightly change Methimazole dose from 10 mg 1 tab daily to 1 tab AM and 0.5 table nightly and  recheck level again in late October

## 2022-10-25 DIAGNOSIS — G71.3 MITOCHONDRIAL MYOPATHY: ICD-10-CM

## 2022-10-25 RX ORDER — CYCLOBENZAPRINE HCL 10 MG
10 TABLET ORAL DAILY
Qty: 30 TABLET | Refills: 0 | OUTPATIENT
Start: 2022-10-25

## 2022-11-10 ENCOUNTER — OFFICE VISIT (OUTPATIENT)
Dept: PAIN MANAGEMENT | Age: 67
End: 2022-11-10
Payer: MEDICARE

## 2022-11-10 VITALS
OXYGEN SATURATION: 94 % | SYSTOLIC BLOOD PRESSURE: 118 MMHG | WEIGHT: 179 LBS | DIASTOLIC BLOOD PRESSURE: 72 MMHG | HEART RATE: 74 BPM | TEMPERATURE: 97.7 F | BODY MASS INDEX: 25.06 KG/M2 | HEIGHT: 71 IN

## 2022-11-10 DIAGNOSIS — G71.3 MITOCHONDRIAL MYOPATHY: ICD-10-CM

## 2022-11-10 DIAGNOSIS — M51.9 LUMBAR DISC DISORDER: ICD-10-CM

## 2022-11-10 DIAGNOSIS — M16.11 PRIMARY OSTEOARTHRITIS OF RIGHT HIP: ICD-10-CM

## 2022-11-10 DIAGNOSIS — M47.816 LUMBAR SPONDYLOSIS: ICD-10-CM

## 2022-11-10 DIAGNOSIS — M54.50 CHRONIC BILATERAL LOW BACK PAIN WITHOUT SCIATICA: ICD-10-CM

## 2022-11-10 DIAGNOSIS — G89.4 CHRONIC PAIN SYNDROME: Primary | ICD-10-CM

## 2022-11-10 DIAGNOSIS — G89.29 CHRONIC BILATERAL LOW BACK PAIN WITHOUT SCIATICA: ICD-10-CM

## 2022-11-10 DIAGNOSIS — M54.16 LUMBAR RADICULOPATHY: ICD-10-CM

## 2022-11-10 DIAGNOSIS — M47.816 LUMBAR FACET ARTHROPATHY: ICD-10-CM

## 2022-11-10 PROCEDURE — G8399 PT W/DXA RESULTS DOCUMENT: HCPCS | Performed by: PHYSICIAN ASSISTANT

## 2022-11-10 PROCEDURE — 3017F COLORECTAL CA SCREEN DOC REV: CPT | Performed by: PHYSICIAN ASSISTANT

## 2022-11-10 PROCEDURE — 3078F DIAST BP <80 MM HG: CPT | Performed by: PHYSICIAN ASSISTANT

## 2022-11-10 PROCEDURE — G8420 CALC BMI NORM PARAMETERS: HCPCS | Performed by: PHYSICIAN ASSISTANT

## 2022-11-10 PROCEDURE — G8484 FLU IMMUNIZE NO ADMIN: HCPCS | Performed by: PHYSICIAN ASSISTANT

## 2022-11-10 PROCEDURE — 99213 OFFICE O/P EST LOW 20 MIN: CPT | Performed by: PHYSICIAN ASSISTANT

## 2022-11-10 PROCEDURE — 1123F ACP DISCUSS/DSCN MKR DOCD: CPT | Performed by: PHYSICIAN ASSISTANT

## 2022-11-10 PROCEDURE — 1036F TOBACCO NON-USER: CPT | Performed by: PHYSICIAN ASSISTANT

## 2022-11-10 PROCEDURE — 1090F PRES/ABSN URINE INCON ASSESS: CPT | Performed by: PHYSICIAN ASSISTANT

## 2022-11-10 PROCEDURE — 3074F SYST BP LT 130 MM HG: CPT | Performed by: PHYSICIAN ASSISTANT

## 2022-11-10 PROCEDURE — G8427 DOCREV CUR MEDS BY ELIG CLIN: HCPCS | Performed by: PHYSICIAN ASSISTANT

## 2022-11-10 RX ORDER — HYDROCODONE BITARTRATE AND ACETAMINOPHEN 7.5; 325 MG/1; MG/1
0.5 TABLET ORAL EVERY 8 HOURS PRN
Qty: 45 TABLET | Refills: 0 | Status: SHIPPED | OUTPATIENT
Start: 2022-11-10 | End: 2022-12-10

## 2022-11-10 NOTE — PROGRESS NOTES
[facility]  [address] [facility], [address] [place] [address]  [phone]    Follow up Note      Ok Paul     Date of Visit:  11/10/2022    CC:  Patient presents for follow up   Chief Complaint   Patient presents with    Back Pain           HPI:    Pain is unchanged. No new changes. Appropriate analgesia with current medications regimen: yes. Change in quality of symptoms:no. Medication side effects:none. Recent diagnostic testing:none. Recent interventional procedures:none. .    She has been on anticoagulation medications to include Plavix/ASA. The patient  has not been on herbal supplements. The patient is not diabetic. Imaging:   Lumbar spine MRI 2021  . Advanced degenerative change with slight grade 1 retrolisthesis at L2-3   and slight grade 1 anterolisthesis at L4-5. Mild levoscoliosis. 2. Moderate central canal stenosis at L3-4 with right-sided disc protrusion   causing mild right subarticular recess stenosis and moderate right neural   foraminal stenosis. 3. Mild to moderate central canal stenosis at L2-3 with small right-sided   disc herniation causing mild right subarticular recess and neural foraminal   stenosis. 4. Small left-sided disc protrusion at L4-5 causing mild left subarticular   recess stenosis and neural foraminal stenosis. No significant central canal   stenosis at this level. 5. Subchondral signal change about L2-3 with some some edema most likely   related to severe degenerative disc disease. Clinical correlation for signs   of infection is suggested. Right hip Xray 2021:  Mild-to-moderate osteoarthritis at the right hip. Lumbar spine Xray 2016:  1. Left convex scoliosis. 2. Multilevel degenerative spondylosis throughout the lumbar spine. 3. Facet arthritis from L2-S1.   4. No acute abnormality is identified. Previous treatments: Physical Therapy and medications. .                            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 Authorizing Provider   HYDROcodone-acetaminophen (NORCO) 7.5-325 MG per tablet Take 0.5 tablets by mouth every 8 hours as needed for Pain for up to 30 days.  Intended supply: 30 days 11/10/22 12/10/22 Yes MANUEL Santiago   cyclobenzaprine (FLEXERIL) 10 MG tablet Take 1 tablet by mouth daily 9/29/22  Yes MANUEL Santiago   methIMAzole (TAPAZOLE) 10 MG tablet Take 1 tablet by mouth 2 times daily 8/31/22  Yes Lina Smith MD   clopidogrel (PLAVIX) 75 MG tablet Take 1 tablet by mouth daily 6/16/22  Yes SEFERINO Bass CNP   atorvastatin (LIPITOR) 10 MG tablet Take 1 tablet by mouth daily 6/16/22  Yes SEFERINO Bass CNP   DULoxetine (CYMBALTA) 60 MG extended release capsule Take 1 capsule by mouth daily 6/16/22  Yes SEFERINO Bass CNP   metoprolol succinate (TOPROL XL) 50 MG extended release tablet Take 1 tablet by mouth 2 times daily 22  Yes SEFERINO Salmeron CNP   gabapentin (NEURONTIN) 300 MG capsule Take 1 capsule by mouth 3 times daily for 270 days. 6/16/22 3/13/23 Yes SEFERINO Salmeron CNP   zoster recombinant adjuvanted vaccine [facility]) 50 MCG/0.5ML SUSR injection Inject 0.5 mLs into the muscle See Admin Instructions 1 dose now and repeat in 2-6 months 22 Yes SEFERINO Salmeron CNP   lidocaine (LIDODERM) 5 % APPLY 1 PATCH TO SKIN DAILY, 12 HOURS ON AND 12 HOURS OFF 22  Yes SEFERINO Jay CNP   Coenzyme Q10 (COQ-10) 100 MG CAPS Take by mouth   Yes Historical Provider, MD   Cholecalciferol (VITAMIN D3) 50 MCG ( [place]) CAPS Take by mouth   Yes Historical Provider, MD   Handmelchor Goldman MISC Until 2026  Yes Alia Mcclain MD       Allergies   Allergen Reactions    Iodine Anaphylaxis and Hives    Evista [Raloxifene Hcl] Hives       Social History     Socioeconomic History    Marital status:       Spouse name: Not on file    Number of children: Not on file    Years of education: Not on file    Highest education level: Not on file   Occupational History    Not on file   Tobacco Use    Smoking status: Former     Packs/day: 1.00     Years: 27.00     Pack years: 27.00     Types: Cigarettes     Quit date:      Years since quittin.8    Smokeless tobacco: Never   Vaping Use    Vaping Use: Never used   Substance and Sexual Activity    Alcohol use: No    Drug use: No    Sexual activity: Not Currently   Other Topics Concern    Not on file   Social History Narrative    Not on file     Social Determinants of Health     Financial Resource Strain: Low Risk     Difficulty of Paying Living Expenses: Not hard at all   Food Insecurity: No Food Insecurity    Worried About [address] in the Last Year: Never true    [address] in the Last Year: Never true   Transportation Needs: No Transportation Needs    Lack of Transportation (Medical): No    Lack of Transportation (Non-Medical): No   Physical Activity: Sufficiently Active    Days of Exercise per Week: 5 days    Minutes of Exercise per Session: 30 min   Stress: Not on file   Social Connections: Not on file   Intimate Partner Violence: Not on file   Housing Stability: Not on file       Family History   Problem Relation Age of Onset    Early Death Mother     Heart Disease Mother     Other Mother 34        blood clot      Colon Cancer Father 80    Diabetes Maternal Uncle     Diabetes Maternal Grandmother        REVIEW OF SYSTEMS:     Yaniv Jesus denies fever/chills, chest pain, shortness of breath, new bowel or bladder complaints. All other review of systems was negative. PHYSICAL EXAMINATION:      /72   Pulse 74   Temp 97.7 °F (36.5 °C) (Infrared)   Ht 5' 11\" (1.803 m)   Wt 179 lb (81.2 kg)   SpO2 94%   BMI 24.97 kg/m²     General:      General appearance:   pleasant and well-hydrated. , in mild discomfort and A & O x3  Build:Normal    HEENT:    Head:normocephalic and atraumatic  Sclera: icterus absent,    Lungs:    Breathing:Normal expansion. No wheezing. Abdomen:    Shape:non-distended and normal    Lumbar spine:    Range of motion:abnormal mildly Lateral bending, flexion, extension rotation bilateral and is  painful. Extremities:    Tremors:None bilaterally upper and lower  Range of motion:Generally normal shoulders, pain with internal rotation of hips not done. Intact:Yes  Edema:Normal    Neurological:    [address]    Dermatology:    Skin:no unusual rashes, no skin lesions, no palpable subcutaneous nodules, and good skin turgor    Impression:    Low back pain with radiation to the right thigh and groin. Overall, doing well today. Plan:  Follow up on her low back and right hip pain with no acute issues, managed with current medications regimen.   Refill Norco 7.5 QD PRN(patient given 45 pills to last 6-8 weeks  Immense relief with Ztlido patches  NO NSAIDS on Plavix  Gabapentin 300 mg TID/Cymbalta 60 mg QD by PCP  Continue with Flexeril 10 mg QD PRN. No RF. OARRS report reviewed   Patient encouraged to stay active and to lose weight. Treatment plan discussed with the patient including medications side effects. Controlled Substance Monitoring:    Acute and Chronic Pain Monitoring:   RX Monitoring 11/10/2022   Periodic Controlled Substance Monitoring Possible medication side effects, risk of tolerance/dependence & alternative treatments discussed. ;No signs of potential drug abuse or diversion identified. ;Assessed functional status. ;Obtaining appropriate analgesic effect of treatment. We discussed with the patient that combining opioids, benzodiazepines, alcohol, illicit drugs or sleep aids increases the risk of respiratory depression including death. We discussed that these medications may cause drowsiness, sedation or dizziness and have counseled the patient not to drive or operate machinery. We have discussed that these medications will be prescribed only by one provider. We have discussed with the patient about age related risk factors and have thoroughly discussed the importance of taking these medications as prescribed. The patient verbalizes understanding.     ccreferring physic

## 2022-11-10 NOTE — PROGRESS NOTES
Do you currently have any of the following:    Fever: No  Headache:  No  Cough: No  Shortness of breath: No  Exposed to anyone with these symptoms: No                                                                                                                Afia Og presents to the [facility] on 11/10/2022. Garret Ashley is complaining of pain in back. The pain is constant. The pain is described as aching and sharp. Pain is rated on her best day at a 8, on her worst day at a 9, and on average at a 8 on the VAS scale. She took her last dose of Norco last night. Garret Ashley does not have issues with constipation. Any procedures since your last visit: No,. She is  on NSAIDS and  is  on anticoagulation medications to include Plavix and is managed by Dr. Pao Basurto. Pacemaker or defibrillator: No.    Medication Contract and Consent for Opioid Use Documents Filed        No documents found                       There were no vitals taken for this visit. No LMP recorded. Patient has had a hysterectomy.

## 2022-11-16 ENCOUNTER — OFFICE VISIT (OUTPATIENT)
Dept: FAMILY MEDICINE CLINIC | Age: 67
End: 2022-11-16
Payer: MEDICARE

## 2022-11-16 VITALS
TEMPERATURE: 97.5 F | SYSTOLIC BLOOD PRESSURE: 122 MMHG | DIASTOLIC BLOOD PRESSURE: 78 MMHG | OXYGEN SATURATION: 98 % | HEART RATE: 81 BPM | HEIGHT: 71 IN | BODY MASS INDEX: 25.34 KG/M2 | WEIGHT: 181 LBS | RESPIRATION RATE: 16 BRPM

## 2022-11-16 DIAGNOSIS — Z12.31 SCREENING MAMMOGRAM FOR BREAST CANCER: ICD-10-CM

## 2022-11-16 DIAGNOSIS — E78.2 MIXED HYPERLIPIDEMIA: ICD-10-CM

## 2022-11-16 DIAGNOSIS — Z23 NEED FOR PROPHYLACTIC VACCINATION AGAINST DIPHTHERIA AND TETANUS: ICD-10-CM

## 2022-11-16 DIAGNOSIS — Z13.820 ENCOUNTER FOR OSTEOPOROSIS SCREENING IN ASYMPTOMATIC POSTMENOPAUSAL PATIENT: ICD-10-CM

## 2022-11-16 DIAGNOSIS — F41.8 ANXIETY WITH DEPRESSION: ICD-10-CM

## 2022-11-16 DIAGNOSIS — Z23 NEEDS FLU SHOT: ICD-10-CM

## 2022-11-16 DIAGNOSIS — I10 BENIGN ESSENTIAL HYPERTENSION: Primary | ICD-10-CM

## 2022-11-16 DIAGNOSIS — Z23 NEED FOR SHINGLES VACCINE: ICD-10-CM

## 2022-11-16 DIAGNOSIS — E55.9 VITAMIN D DEFICIENCY: ICD-10-CM

## 2022-11-16 DIAGNOSIS — Z78.0 ENCOUNTER FOR OSTEOPOROSIS SCREENING IN ASYMPTOMATIC POSTMENOPAUSAL PATIENT: ICD-10-CM

## 2022-11-16 PROCEDURE — G8484 FLU IMMUNIZE NO ADMIN: HCPCS | Performed by: NURSE PRACTITIONER

## 2022-11-16 PROCEDURE — 3074F SYST BP LT 130 MM HG: CPT | Performed by: NURSE PRACTITIONER

## 2022-11-16 PROCEDURE — 1036F TOBACCO NON-USER: CPT | Performed by: NURSE PRACTITIONER

## 2022-11-16 PROCEDURE — 1123F ACP DISCUSS/DSCN MKR DOCD: CPT | Performed by: NURSE PRACTITIONER

## 2022-11-16 PROCEDURE — G8427 DOCREV CUR MEDS BY ELIG CLIN: HCPCS | Performed by: NURSE PRACTITIONER

## 2022-11-16 PROCEDURE — 3078F DIAST BP <80 MM HG: CPT | Performed by: NURSE PRACTITIONER

## 2022-11-16 PROCEDURE — G0008 ADMIN INFLUENZA VIRUS VAC: HCPCS | Performed by: NURSE PRACTITIONER

## 2022-11-16 PROCEDURE — 99214 OFFICE O/P EST MOD 30 MIN: CPT | Performed by: NURSE PRACTITIONER

## 2022-11-16 PROCEDURE — G8399 PT W/DXA RESULTS DOCUMENT: HCPCS | Performed by: NURSE PRACTITIONER

## 2022-11-16 PROCEDURE — 1090F PRES/ABSN URINE INCON ASSESS: CPT | Performed by: NURSE PRACTITIONER

## 2022-11-16 PROCEDURE — G8417 CALC BMI ABV UP PARAM F/U: HCPCS | Performed by: NURSE PRACTITIONER

## 2022-11-16 PROCEDURE — 90694 VACC AIIV4 NO PRSRV 0.5ML IM: CPT | Performed by: NURSE PRACTITIONER

## 2022-11-16 PROCEDURE — 3017F COLORECTAL CA SCREEN DOC REV: CPT | Performed by: NURSE PRACTITIONER

## 2022-11-16 RX ORDER — ZOSTER VACCINE RECOMBINANT, ADJUVANTED 50 MCG/0.5
0.5 KIT INTRAMUSCULAR SEE ADMIN INSTRUCTIONS
Qty: 0.5 ML | Refills: 1 | Status: SHIPPED | OUTPATIENT
Start: 2022-11-16 | End: 2023-05-15

## 2022-11-16 ASSESSMENT — ENCOUNTER SYMPTOMS
CHEST TIGHTNESS: 0
CONSTIPATION: 0
ABDOMINAL PAIN: 0
TROUBLE SWALLOWING: 0
VOICE CHANGE: 0
SHORTNESS OF BREATH: 0
WHEEZING: 0
SINUS PAIN: 0
SORE THROAT: 0
DIARRHEA: 0
VOMITING: 0
FACIAL SWELLING: 0
SINUS PRESSURE: 0
COUGH: 0
RHINORRHEA: 0
BACK PAIN: 1
NAUSEA: 0
COLOR CHANGE: 0

## 2022-11-16 NOTE — ASSESSMENT & PLAN NOTE
Stable at this time but the winter is hard on her. Fasting labs ordered, continue Cymbalta 60 mg daily. Denies any suicidal or homicidal ideations.

## 2022-11-16 NOTE — Clinical Note
Rachel Hoover,  So I'm changing collaborating physicians can you take over her gabapentin?    Thanks, Francisco Javier Mcfadden

## 2022-11-16 NOTE — PROGRESS NOTES
OFFICE PROGRESS NOTE  [address] [place] [address] Rd  [id number]  Dept: [phone]   Chief Complaint   Patient presents with    Hyperlipidemia    Hypertension    Health Maintenance     Due for mammo, insurance does not cover shingles( they will start covering in Jan)  need new script. Due for tdap,        ASSESSMENT/PLAN   1. Benign essential hypertension  Assessment & Plan:   well controlled, no significant medication side effects noted and labs reviewed in Epic from Dr Cat Bourgeois metoprolol succinate 50 mg BID, fasting labs ordered today.   -Discussed taking medication and directed every day. -Discussed exercising daily 30 minutes 5 times a week for 150 minutes weekly.  -Discussed weight reduction if needed.  -Discussed low sodium diet.  -Discussed limiting caffeine consumption and tobacco cessation and the effects they have on the heart and blood pressure. Orders:  -     CBC with Auto Differential; Future  -     Comprehensive Metabolic Panel; Future  -     Lipid Panel; Future  2. Mixed hyperlipidemia  Assessment & Plan:   reasonably well controlled, no significant medication side effects noted and fasting labs ordered today continue atorvastatin 10 mg nightly.   -Discussed low fat diet, limit fast food, goodies, breads and pastas if consuming several days a week,  limit any alcohol consumption.  -Discussed weight reduction and exercise 30 minutes 5 days a week for total of 150 minutes weekly.  -Discussed if any unusual muscle aching/pain to contact the office, discussed medication and risk of muscle pain/damage from Rhabdomyolysis. Orders:  -     Lipid Panel; Future  3. Anxiety with depression  Assessment & Plan:   Stable at this time but the winter is hard on her. Fasting labs ordered, continue Cymbalta 60 mg daily. Denies any suicidal or homicidal ideations.    4. Needs flu shot  Assessment & Plan:   Wash your hands regularly, and keep your hands away from your face. Cover your mouth when you cough or sneeze. Rest, plenty of fluids, Tylenol for body aches, fever. Stay home from school, work, and other public places until you are feeling better and your fever has been gone for at least 24 hours. The fever needs to have gone away on its own without the help of medicine. Ask people living with you to talk to their doctors about preventing the flu. They may get antiviral medicine to keep from getting the flu from you. To prevent the flu in the future, get a flu vaccine every fall. Encourage people living with you to get the vaccine. Flu vaccine given today  5. Screening mammogram for breast cancer  -     HUA DIGITAL SCREEN W OR WO CAD BILATERAL; Future  6. Encounter for osteoporosis screening in asymptomatic postmenopausal patient  -     DEXA AXIAL SKELETON W VERTEBRAL FX ASST; Future  7. Need for shingles vaccine  Assessment & Plan:   .shinglesvacc  A sore arm with mild or moderate pain is very common after recombinant shingles vaccine, affecting about 80% of vaccinated people. Redness and swelling can also happen at the site of the injection. Tiredness, muscle pain, headache, shivering, fever, stomach pain, and nausea happen after vaccination in more than half of people who receive recombinant shingles vaccine. Orders:  -     zoster recombinant adjuvanted vaccine [facility]) 50 MCG/0.5ML SUSR injection; Inject 0.5 mLs into the muscle See Admin Instructions 1 dose now and repeat in 2-6 months, Disp-0.5 mL, R-1Print  8. Need for prophylactic vaccination against diphtheria and tetanus  Assessment & Plan:   Common side effects of Td vaccination include soreness in the arm where you got the shot and a mild fever. These usually occur within 3 days of the shot and last a short time  Orders:  -     Tetanus-Diphth-Acell Pertussis ([address]) 5-2.5-18.5 LF-MCG/0.5 injection; Inject 0.5 mLs into the muscle once for 1 dose, Disp-0.5 mL, R-0Print  9. Vitamin D deficiency  Assessment & Plan:   Unclear control check labs secondary to her hyperthyroid and vitamin D deficiency needs DEXA  Orders:  -     Vitamin D 25 Hydroxy; Future     Reviewed labs: CMP, CBCD, Lipids, TSH, FT4, vitamin D  [facility]  Reviewed radiology mammogram    Discussed exercising 30 minutes daily and Discussed taking medications as directed and adverse effects    Return in about 6 months (around 5/16/2023) for HTN, hyperlipidemia. HPI:   Hypertension: Patient here for follow-up of elevated blood pressure. She is exercising and is adherent to low salt diet. Blood pressure is well controlled at home. Cardiac symptoms none. Patient denies chest pain, chest pressure/discomfort, claudication, dyspnea, exertional chest pressure/discomfort, fatigue, irregular heart beat, lower extremity edema, near-syncope, orthopnea, palpitations, paroxysmal nocturnal dyspnea, syncope and tachypnea. Cardiovascular risk factors: advanced age (older than 54 for men, 72 for women), dyslipidemia, hypertension and smoking/ tobacco exposure. Use of agents associated with hypertension: none and Tapazole. History of target organ damage: angina/ prior myocardial infarction. Hyperlipidemia: Patient presents with hyperlipidemia. She was tested because HTN, hyperlipidemia. Her last labs 3/21/22  showed Total cholesterol of 153, HDL 40, LDL 76,  Triglycerides 185. She denies chest pain, dyspnea, exertional chest pressure/discomfort, fatigue, feeding intolerance, lower extremity edema, palpitations, poor exercise tolerance, syncope, tachypnea and skin xanthelasma. There is not a family history of hyperlipidemia. There is not a family history of early ischemia heart disease. She is due for mammogram and DEXA, following with Dr MITCHELL [facility] for her hyperthyroid labs reviewed today.      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 She denies any suicidal or homicidal ideations. She lost her  3 years ago. Current Outpatient Medications:     zoster recombinant adjuvanted vaccine [facility]) 50 MCG/0.5ML SUSR injection, Inject 0.5 mLs into the muscle See Admin Instructions 1 dose now and repeat in 2-6 months, Disp: 0.5 mL, Rfl: 1    Tetanus-Diphth-Acell Pertussis (BOOSTRIX) 5-2.5-18.5 LF-MCG/0.5 injection, Inject 0.5 mLs into the muscle once for 1 dose, Disp: 0.5 mL, Rfl: 0    HYDROcodone-acetaminophen (NORCO) 7.5-325 MG per tablet, Take 0.5 tablets by mouth every 8 hours as needed for Pain for up to 30 days. Intended supply: 30 days, Disp: 45 tablet, Rfl: 0    cyclobenzaprine (FLEXERIL) 10 MG tablet, Take 1 tablet by mouth daily, Disp: 30 tablet, Rfl: 0    methIMAzole (TAPAZOLE) 10 MG tablet, Take 1 tablet by mouth 2 times daily (Patient taking differently: Take 10 mg by mouth 2 times daily Patient is taking one in the morning and 1/2 at night.), Disp: 60 tablet, Rfl: 5    clopidogrel (PLAVIX) 75 MG tablet, Take 1 tablet by mouth daily, Disp: 90 tablet, Rfl: 1    atorvastatin (LIPITOR) 10 MG tablet, Take 1 tablet by mouth daily, Disp: 90 tablet, Rfl: 1    DULoxetine (CYMBALTA) 60 MG extended release capsule, Take 1 capsule by mouth daily, Disp: 90 capsule, Rfl: 1    metoprolol succinate (TOPROL XL) 50 MG extended release tablet, Take 1 tablet by mouth 2 times daily, Disp: 180 tablet, Rfl: 1    gabapentin (NEURONTIN) 300 MG capsule, Take 1 capsule by mouth 3 times daily for 270 days. , Disp: 270 capsule, Rfl: 3    lidocaine (LIDODERM) 5 %, APPLY 1 PATCH TO SKIN DAILY, 12 HOURS ON AND 12 HOURS OFF, Disp: 90 patch, Rfl: 0    Coenzyme Q10 (COQ-10) 100 MG CAPS, Take by mouth, Disp: , Rfl:     Cholecalciferol (VITAMIN D3) 50 MCG (2000 UT) CAPS, Take by mouth, Disp: , Rfl:     Handicap Placard MISC, Until 12/2/2026, Disp: 1 each, Rfl: 0      Surgical History:  has a past surgical history that includes Cholecystectomy; Tonsillectomy; Neck surgery; Coronary angioplasty with stent (2014); Hysterectomy; Partial hysterectomy; Appendectomy; Abdominal exploration surgery; and Pain management procedure (N/A, 2/7/2022). Social History:  reports that she quit smoking about 11 years ago. Her smoking use included cigarettes. She has a 27.00 pack-year smoking history. She has never used smokeless tobacco. She reports that she does not drink alcohol and does not use drugs. Family History: family history includes Colon Cancer (age of onset: 80) in her father; Diabetes in her maternal grandmother and maternal uncle; Early Death in her mother; Heart Disease in her mother; Other (age of onset: 34) in her mother. I have reviewed Marina's allergies, medications, problem list, medical, social and family history and have updated as needed in the electronic medical record    Review of Systems   Constitutional:  Negative for activity change, appetite change, chills, diaphoresis, fatigue, fever and unexpected weight change. HENT:  Negative for congestion, dental problem, drooling, ear discharge, ear pain, facial swelling, hearing loss, mouth sores, nosebleeds, postnasal drip, rhinorrhea, sinus pressure, sinus pain, sneezing, sore throat, tinnitus, trouble swallowing and voice change. Eyes:  Negative for visual disturbance. Respiratory:  Negative for cough, chest tightness, shortness of breath and wheezing. Cardiovascular:  Negative for chest pain, palpitations and leg swelling. Gastrointestinal:  Negative for abdominal pain, constipation, diarrhea, nausea and vomiting. Endocrine: Negative for cold intolerance, heat intolerance, polydipsia, polyphagia and polyuria. Genitourinary:  Negative for difficulty urinating, frequency and urgency. Musculoskeletal:  Positive for back pain (follows with pain management). Negative for arthralgias, gait problem, joint swelling, myalgias, neck pain and neck stiffness.    Skin: Negative for color change, pallor, rash and wound. Allergic/Immunologic: Negative for environmental allergies, food allergies and immunocompromised state. Neurological:  Negative for dizziness, tremors, seizures, syncope, facial asymmetry, speech difficulty, weakness, light-headedness, numbness and headaches. Hematological:  Negative for adenopathy. Does not bruise/bleed easily. Psychiatric/Behavioral:  Negative for agitation, behavioral problems, confusion, decreased concentration, dysphoric mood, hallucinations, self-injury, sleep disturbance and suicidal ideas. The patient is not nervous/anxious and is not hyperactive. OBJECTIVE:     VS:  Wt Readings from Last 3 Encounters:   11/16/22 181 lb (82.1 kg)   11/10/22 179 lb (81.2 kg)   09/29/22 179 lb (81.2 kg)                       Vitals:    11/16/22 1256   BP: 122/78   Pulse: 81   Resp: 16   Temp: 97.5 °F (36.4 °C)   SpO2: 98%   Weight: 181 lb (82.1 kg)   Height: 5' 11\" (1.803 m)       General: Alert and oriented to person, place, and time, well developed and well nourished, in no acute distress  SKIN: Warm and dry, intact without any rash, masses or lesions  HEAD: normocephalic, atraumatic  Eyes: sclera/conjunctiva clear, PERRLA, EOMI's intact  ENT: tympanic membranes, external ear and ear canal normal bilaterally, normal hearing, Nose without deformity, nasal mucosa and turbinates normal without polyps   Throat: clear, tongue midline,  drainage, no masses or lesions noted, good dentition  Neck: supple and non-tender without mass, trachea midline, no cervical lymphadenopathy, no bruit, no thyromegaly or nodules  Cardiovascular: regular rate and regular rhythm, normal S1 and S2,  no murmurs, rubs, clicks, or gallop. Distal pulses intact, no carotid bruits.  No edema  Pulmonary/Chest: clear to auscultation bilaterally, no wheezes, rales or rhonchi, normal air movement, no respiratory distress  Abdomen: soft, non-tender, non-distended, normal bowel sounds, no masses or hepatosplenomegaly  Neurologic:  gait, coordination and speech normal  Extremities: no clubbing, cyanosis, or edema. Psychiatric: Good eye contact, normal mood and affect, answers questions appropriately    I have reviewed my findings and recommendations with Brandi Castillo.     Az Tyler, APRN - CNP, NP-C, FNP-BC

## 2022-11-16 NOTE — ASSESSMENT & PLAN NOTE
reasonably well controlled, no significant medication side effects noted and fasting labs ordered today continue atorvastatin 10 mg nightly.   -Discussed low fat diet, limit fast food, goodies, breads and pastas if consuming several days a week,  limit any alcohol consumption.  -Discussed weight reduction and exercise 30 minutes 5 days a week for total of 150 minutes weekly.  -Discussed if any unusual muscle aching/pain to contact the office, discussed medication and risk of muscle pain/damage from Rhabdomyolysis.

## 2022-11-22 DIAGNOSIS — E78.2 MIXED HYPERLIPIDEMIA: ICD-10-CM

## 2022-11-22 DIAGNOSIS — E05.90 HYPERTHYROIDISM: ICD-10-CM

## 2022-11-22 DIAGNOSIS — E55.9 VITAMIN D DEFICIENCY: ICD-10-CM

## 2022-11-22 DIAGNOSIS — Z95.5 STENTED CORONARY ARTERY: ICD-10-CM

## 2022-11-22 DIAGNOSIS — I10 BENIGN ESSENTIAL HYPERTENSION: ICD-10-CM

## 2022-11-22 DIAGNOSIS — F41.8 ANXIETY WITH DEPRESSION: ICD-10-CM

## 2022-11-22 LAB
ALBUMIN SERPL-MCNC: 4.1 G/DL (ref 3.5–5.2)
ALP BLD-CCNC: 139 U/L (ref 35–104)
ALT SERPL-CCNC: 11 U/L (ref 0–32)
ANION GAP SERPL CALCULATED.3IONS-SCNC: 12 MMOL/L (ref 7–16)
AST SERPL-CCNC: 16 U/L (ref 0–31)
BASOPHILS ABSOLUTE: 0.04 E9/L (ref 0–0.2)
BASOPHILS RELATIVE PERCENT: 0.8 % (ref 0–2)
BILIRUB SERPL-MCNC: 0.2 MG/DL (ref 0–1.2)
BUN BLDV-MCNC: 14 MG/DL (ref 6–23)
CALCIUM SERPL-MCNC: 9.2 MG/DL (ref 8.6–10.2)
CHLORIDE BLD-SCNC: 103 MMOL/L (ref 98–107)
CHOLESTEROL, TOTAL: 123 MG/DL (ref 0–199)
CO2: 26 MMOL/L (ref 22–29)
CREAT SERPL-MCNC: 0.6 MG/DL (ref 0.5–1)
EOSINOPHILS ABSOLUTE: 0.15 E9/L (ref 0.05–0.5)
EOSINOPHILS RELATIVE PERCENT: 2.8 % (ref 0–6)
GFR SERPL CREATININE-BSD FRML MDRD: >60 ML/MIN/1.73
GLUCOSE BLD-MCNC: 104 MG/DL (ref 74–99)
HCT VFR BLD CALC: 45.2 % (ref 34–48)
HDLC SERPL-MCNC: 51 MG/DL
HEMOGLOBIN: 14.7 G/DL (ref 11.5–15.5)
IMMATURE GRANULOCYTES #: 0.01 E9/L
IMMATURE GRANULOCYTES %: 0.2 % (ref 0–5)
LDL CHOLESTEROL CALCULATED: 54 MG/DL (ref 0–99)
LYMPHOCYTES ABSOLUTE: 1.69 E9/L (ref 1.5–4)
LYMPHOCYTES RELATIVE PERCENT: 31.8 % (ref 20–42)
MCH RBC QN AUTO: 30.4 PG (ref 26–35)
MCHC RBC AUTO-ENTMCNC: 32.5 % (ref 32–34.5)
MCV RBC AUTO: 93.4 FL (ref 80–99.9)
MONOCYTES ABSOLUTE: 0.39 E9/L (ref 0.1–0.95)
MONOCYTES RELATIVE PERCENT: 7.3 % (ref 2–12)
NEUTROPHILS ABSOLUTE: 3.04 E9/L (ref 1.8–7.3)
NEUTROPHILS RELATIVE PERCENT: 57.1 % (ref 43–80)
PDW BLD-RTO: 15.1 FL (ref 11.5–15)
PLATELET # BLD: 205 E9/L (ref 130–450)
PMV BLD AUTO: 10.3 FL (ref 7–12)
POTASSIUM SERPL-SCNC: 3.6 MMOL/L (ref 3.5–5)
RBC # BLD: 4.84 E12/L (ref 3.5–5.5)
SODIUM BLD-SCNC: 141 MMOL/L (ref 132–146)
T4 FREE: 0.54 NG/DL (ref 0.93–1.7)
TOTAL PROTEIN: 6.5 G/DL (ref 6.4–8.3)
TRIGL SERPL-MCNC: 92 MG/DL (ref 0–149)
TSH SERPL DL<=0.05 MIU/L-ACNC: 23.95 UIU/ML (ref 0.27–4.2)
VITAMIN D 25-HYDROXY: 71 NG/ML (ref 30–100)
VLDLC SERPL CALC-MCNC: 18 MG/DL
WBC # BLD: 5.3 E9/L (ref 4.5–11.5)

## 2022-11-22 RX ORDER — METOPROLOL SUCCINATE 50 MG/1
50 TABLET, EXTENDED RELEASE ORAL 2 TIMES DAILY
Qty: 180 TABLET | Refills: 1 | Status: SHIPPED | OUTPATIENT
Start: 2022-11-22

## 2022-11-22 RX ORDER — CLOPIDOGREL BISULFATE 75 MG/1
75 TABLET ORAL DAILY
Qty: 90 TABLET | Refills: 1 | Status: SHIPPED | OUTPATIENT
Start: 2022-11-22

## 2022-11-22 RX ORDER — DULOXETIN HYDROCHLORIDE 60 MG/1
60 CAPSULE, DELAYED RELEASE ORAL DAILY
Qty: 90 CAPSULE | Refills: 1 | Status: SHIPPED | OUTPATIENT
Start: 2022-11-22

## 2022-11-22 RX ORDER — ATORVASTATIN CALCIUM 10 MG/1
10 TABLET, FILM COATED ORAL DAILY
Qty: 90 TABLET | Refills: 1 | Status: SHIPPED | OUTPATIENT
Start: 2022-11-22

## 2022-11-28 ENCOUNTER — HOSPITAL ENCOUNTER (OUTPATIENT)
Dept: GENERAL RADIOLOGY | Age: 67
Discharge: HOME | End: 2022-11-30
Payer: MEDICARE

## 2022-11-28 VITALS — BODY MASS INDEX: 24.92 KG/M2 | HEIGHT: 71 IN | WEIGHT: 178 LBS

## 2022-11-28 DIAGNOSIS — Z13.820 ENCOUNTER FOR SCREENING FOR OSTEOPOROSIS: Primary | ICD-10-CM

## 2022-11-28 DIAGNOSIS — Z78.0 ENCOUNTER FOR OSTEOPOROSIS SCREENING IN ASYMPTOMATIC POSTMENOPAUSAL PATIENT: ICD-10-CM

## 2022-11-28 DIAGNOSIS — Z13.820 ENCOUNTER FOR OSTEOPOROSIS SCREENING IN ASYMPTOMATIC POSTMENOPAUSAL PATIENT: ICD-10-CM

## 2022-11-28 DIAGNOSIS — Z13.820 OSTEOPOROSIS SCREENING: ICD-10-CM

## 2022-11-28 DIAGNOSIS — Z12.31 SCREENING MAMMOGRAM FOR BREAST CANCER: ICD-10-CM

## 2022-11-28 PROCEDURE — 77080 DXA BONE DENSITY AXIAL: CPT

## 2022-11-28 PROCEDURE — 77067 SCR MAMMO BI INCL CAD: CPT

## 2022-11-29 NOTE — RESULT ENCOUNTER NOTE
Please notify patient that their DEXA results are normal in the spine and hips she does have osteopenia in the left forearm, so start calcium 500 mg twice a day. Get enough calcium and vitamin D. The [place] [employer] recommends adults younger than age 46 need 1,000 mg of calcium and 600 IU of vitamin D each day. Women ages 46 to 79 need 1,200 mg of calcium and 600 IU of vitamin D each day. Men ages 46 to 79 need 1,000 mg of calcium and 600 IU of vitamin D each day. Adults 71 and older need 1,200 mg of calcium and 800 IU of vitamin D each day. Eat foods rich in calcium, like yogurt, cheese, milk, and dark green vegetables. This is a good way to get the calcium you need. You can get vitamin D from eggs, fatty fish, cereal, and milk. Talk to your doctor about taking a calcium plus vitamin D supplement. Be careful, though. Adults ages 23 to 48 should not get more than 2,500 mg of calcium and 4,000 IU of vitamin D each day, whether it is from supplements and/or food. Adults ages 46 and older should not get more than 2,000 mg of calcium and 4,000 IU of vitamin D each day from supplements and/or food. Limit alcohol to 2 drinks a day for men and 1 drink a day for women. Too much alcohol can cause health problems. Do not smoke. Smoking puts you at a much higher risk for osteoporosis. If you need help quitting, talk to your doctor about stop-smoking programs and medicines. These can increase your chances of quitting for good. Get regular bone-building exercise. Weight-bearing and resistance exercises keep bones healthy by working the muscles and bones against gravity. Start out at an exercise level that feels right for you. Add a little at a time until you can do the following:  Do 30 minutes of weight-bearing exercise on most days of the week. Walking, jogging, stair climbing, and dancing are good choices. Do resistance exercises with weights or elastic bands 2 to 3 days a week.   Reduce your risk of falls:  Wear supportive shoes with low heels and nonslip soles. Use a cane or walker, if you need it. Use shower chairs and bath benches. Put in handrails on stairways, around your shower or tub area, and near the toilet. Keep stairs, porches, and walkways well lit. Use night-lights. Remove throw rugs and other objects that are in the way. Avoid icy, wet, or slippery surfaces. Keep a cordless phone and a flashlight with new batteries by your bed.

## 2022-11-30 ENCOUNTER — OFFICE VISIT (OUTPATIENT)
Dept: ENDOCRINOLOGY | Age: 67
End: 2022-11-30
Payer: MEDICARE

## 2022-11-30 VITALS
HEIGHT: 71 IN | SYSTOLIC BLOOD PRESSURE: 138 MMHG | HEART RATE: 86 BPM | OXYGEN SATURATION: 94 % | WEIGHT: 179 LBS | DIASTOLIC BLOOD PRESSURE: 81 MMHG | BODY MASS INDEX: 25.06 KG/M2

## 2022-11-30 DIAGNOSIS — E05.00 GRAVES' DISEASE: Primary | ICD-10-CM

## 2022-11-30 DIAGNOSIS — E55.9 VITAMIN D DEFICIENCY: ICD-10-CM

## 2022-11-30 DIAGNOSIS — E05.90 HYPERTHYROIDISM: ICD-10-CM

## 2022-11-30 PROCEDURE — G8420 CALC BMI NORM PARAMETERS: HCPCS | Performed by: CLINICAL NURSE SPECIALIST

## 2022-11-30 PROCEDURE — 3074F SYST BP LT 130 MM HG: CPT | Performed by: CLINICAL NURSE SPECIALIST

## 2022-11-30 PROCEDURE — G8484 FLU IMMUNIZE NO ADMIN: HCPCS | Performed by: CLINICAL NURSE SPECIALIST

## 2022-11-30 PROCEDURE — 3017F COLORECTAL CA SCREEN DOC REV: CPT | Performed by: CLINICAL NURSE SPECIALIST

## 2022-11-30 PROCEDURE — 1090F PRES/ABSN URINE INCON ASSESS: CPT | Performed by: CLINICAL NURSE SPECIALIST

## 2022-11-30 PROCEDURE — 3078F DIAST BP <80 MM HG: CPT | Performed by: CLINICAL NURSE SPECIALIST

## 2022-11-30 PROCEDURE — 1123F ACP DISCUSS/DSCN MKR DOCD: CPT | Performed by: CLINICAL NURSE SPECIALIST

## 2022-11-30 PROCEDURE — G8399 PT W/DXA RESULTS DOCUMENT: HCPCS | Performed by: CLINICAL NURSE SPECIALIST

## 2022-11-30 PROCEDURE — 1036F TOBACCO NON-USER: CPT | Performed by: CLINICAL NURSE SPECIALIST

## 2022-11-30 PROCEDURE — G8427 DOCREV CUR MEDS BY ELIG CLIN: HCPCS | Performed by: CLINICAL NURSE SPECIALIST

## 2022-11-30 PROCEDURE — 99214 OFFICE O/P EST MOD 30 MIN: CPT | Performed by: CLINICAL NURSE SPECIALIST

## 2022-11-30 RX ORDER — METHIMAZOLE 10 MG/1
5 TABLET ORAL DAILY
Qty: 45 TABLET | Refills: 1
Start: 2022-11-30

## 2022-11-30 NOTE — PROGRESS NOTES
[address] [facility] Department of Endocrinology Diabetes and Metabolism   [address] [facility] [address]   Phone: [phone]  Fax: [phone]    Date of Service: 11/30/2022  Primary Care Physician: SEFERINO Carson - CNP  Referring physician: No ref. provider found  Provider: SEFERINO Williamson      Reason for the visit:  Hyperthyroidism    History of Present Illness: The history is provided by the patient. No  was used. Accuracy of the patient data is excellent. Susi Good is a very pleasant 69 y.o. female seen today for evaluation and management of hyperthyroidism     Susi Good was diagnosed with hyperthyroidism in 2/2021 and currently on methimazole 10  mg in am and 5 mg PM      Most recent labs:  TSH 23.9, FT4 0.54 (11/2022)  Lab Results   Component Value Date/Time    TSH 23.950 (H) 11/22/2022 09:57 AM    T4FREE 0.54 (L) 11/22/2022 09:57 AM    S8GKTHG 11.8 (H) 09/08/2022 11:59 AM    FT3 10.1 (H) 06/17/2022 12:37 PM    FT3 4.7 (H) 03/21/2022 11:25 AM    FT3 3.7 01/03/2022 11:10 AM    FT3 9.1 (H) 11/29/2021 02:20 PM    U4AMMDU 231.40 (H) 09/08/2022 11:59 AM    TSI 3.84 (H) 08/31/2022 11:31 AM    TPOABS 11.0 08/31/2022 11:31 AM       Thyroid uptake and scan 9/2021 showed 6-hour uptake to be 27% and 24-hour uptake to be 42% consistent with Graves' disease      Patient denied any history of  palpitations, swelling in the area of the thyroid gland, weight loss, change in appetite, nervousness, anxiety, irritability, tremor, sweating, heat intolerance, changes in bowel habits, muscle weakness or difficulty sleeping.      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[facility] [address]     Need for vaccination against Streptococcus pneumoniae 2022    Neuropathy        PAST SURGICAL HISTORY   Past Surgical History:   Procedure Laterality Date    ABDOMINAL EXPLORATION SURGERY      APPENDECTOMY      BREAST BIOPSY      CHOLECYSTECTOMY      CORONARY ANGIOPLASTY WITH STENT PLACEMENT      HYSTERECTOMY (CERVIX STATUS UNKNOWN)      NECK SURGERY      PAIN MANAGEMENT PROCEDURE N/A 2022    LUMBAR EPIDURAL STEROID INJECTION L3-4 WITH 80 DEPO performed by Danial Dixon MD at [address] [place] [address] [address] (CERVIX NOT REMOVED)      TONSILLECTOMY         SOCIAL HISTORY   Tobacco:   reports that she quit smoking about 11 years ago. Her smoking use included cigarettes. She has a 27.00 pack-year smoking history. She has never used smokeless tobacco.  Alcohol:   reports no history of alcohol use. Drugs:   reports no history of drug use. FAMILY HISTORY   Family History   Problem Relation Age of Onset    Early Death Mother     Heart Disease Mother     Other Mother 34        blood clot      Colon Cancer Father 80    Diabetes Maternal Uncle     Diabetes Maternal Grandmother        ALLERGIES AND DRUG REACTIONS   Allergies   Allergen Reactions    Iodine Anaphylaxis and Hives    Evista [Raloxifene Hcl] Hives       CURRENT MEDICATIONS   Current Outpatient Medications   Medication Sig Dispense Refill    methIMAzole (TAPAZOLE) 10 MG tablet Take 0.5 tablets by mouth daily Patient is taking one in the morning and 1/2 at night. 45 tablet 1    clopidogrel (PLAVIX) 75 MG tablet Take 1 tablet by mouth daily 90 tablet 1    atorvastatin (LIPITOR) 10 MG tablet Take 1 tablet by mouth daily 90 tablet 1    DULoxetine (CYMBALTA) 60 MG extended release capsule Take 1 capsule by mouth daily 90 capsule 1    metoprolol succinate (TOPROL XL) 50 MG extended release tablet Take 1 tablet by mouth in the morning and 1 tablet in the evening.  80 tablet 1    zoster recombinant adjuvanted vaccine (SHINGRIX) 50 MCG/0.5ML SUSR injection Inject 0.5 mLs into the muscle See Admin Instructions 1 dose now and repeat in 2-6 months 0.5 mL 1    HYDROcodone-acetaminophen (NORCO) 7.5-325 MG per tablet Take 0.5 tablets by mouth every 8 hours as needed for Pain for up to 30 days. Intended supply: 30 days 45 tablet 0    cyclobenzaprine (FLEXERIL) 10 MG tablet Take 1 tablet by mouth daily 30 tablet 0    gabapentin (NEURONTIN) 300 MG capsule Take 1 capsule by mouth 3 times daily for 270 days. 270 capsule 3    lidocaine (LIDODERM) 5 % APPLY 1 PATCH TO SKIN DAILY, 12 HOURS ON AND 12 HOURS OFF 90 patch 0    Coenzyme Q10 (COQ-10) 100 MG CAPS Take by mouth      Cholecalciferol (VITAMIN D3) 50 MCG (2000 UT) CAPS Take by mouth      Handicap Placard MISC Until 12/2/2026 1 each 0     No current facility-administered medications for this visit. Review of Systems  Constitutional: No fever, no chills, no diaphoresis, no generalized weakness. HEENT: No blurred vision, No sore throat, no ear pain, no hair loss  Neck: denied any neck swelling, difficulty swallowing,   Cadrdiopulomary: No CP, SOB or palpitation, No orthopnea or PND. No cough or wheezing. GI: No N/V/D, no constipation, No abdominal pain, no melena or hematochezia   : Denied any dysuria, hematuria, flank pain, discharge, or incontinence. Skin: denied any rash, ulcer, Hirsute, or hyperpigmentation. MSK: denied any joint deformity, joint pain/swelling, muscle pain, or back pain.   Neuro: no numbess, no tingling, no weakness,     OBJECTIVE    /81   Pulse 86   Ht 5' 11\" (1.803 m)   Wt 179 lb (81.2 kg)   SpO2 94%   BMI 24.97 kg/m²   BP Readings from Last 4 Encounters:   11/30/22 138/81   11/16/22 122/78   11/10/22 118/72   09/29/22 128/72     Wt Readings from Last 6 Encounters:   11/30/22 179 lb (81.2 kg)   11/28/22 178 lb (80.7 kg)   11/16/22 181 lb (82.1 kg)   11/10/22 179 lb (81.2 kg)   09/29/22 179 lb (81.2 kg)   08/31/22 179 lb (81.2 kg)       Physical examination:  General: awake alert, oriented x3, no abnormal position or movements. HEENT: normocephalic non traumatic, no exophthalmos, no lid lag, no lid retraction   Neck: supple, no LN enlargement, no thyromegaly, no thyroid tenderness, no thyroid bruit, no JVD. Pulm: Clear equal air entry no added sounds, no wheezing or rhonchi    CVS: S1 + S2, no murmur, no heave. Dorsalis pedis pulse palpable   Abd: soft lax, no tenderness, no organomegaly, audible bowel sounds. Skin: warm, no lesions, no rash.  No palmar erythema, no onycholysis, no pretibial Myxoedema, no acropachy   Musculoskeletal: No back tenderness, no kyphosis/scoliosis    Neuro: CN intact, sensation notmal , muscle power normal. No tremors   Psych: normal mood, and affect    Review of Laboratory Data:  I have reviewed the following:  Lab Results   Component Value Date/Time    WBC 5.3 11/22/2022 09:57 AM    RBC 4.84 11/22/2022 09:57 AM    HGB 14.7 11/22/2022 09:57 AM    HCT 45.2 11/22/2022 09:57 AM    MCV 93.4 11/22/2022 09:57 AM    MCH 30.4 11/22/2022 09:57 AM    MCHC 32.5 11/22/2022 09:57 AM    RDW 15.1 (H) 11/22/2022 09:57 AM     11/22/2022 09:57 AM    MPV 10.3 11/22/2022 09:57 AM      Lab Results   Component Value Date/Time     11/22/2022 09:57 AM    K 3.6 11/22/2022 09:57 AM    CO2 26 11/22/2022 09:57 AM    BUN 14 11/22/2022 09:57 AM    CREATININE 0.6 11/22/2022 09:57 AM    CALCIUM 9.2 11/22/2022 09:57 AM    LABGLOM >60 11/22/2022 09:57 AM    GFRAA >60 03/21/2022 11:25 AM      Lab Results   Component Value Date/Time    TSH 23.950 (H) 11/22/2022 09:57 AM    T4FREE 0.54 (L) 11/22/2022 09:57 AM    F3DOEKB 11.8 (H) 09/08/2022 11:59 AM    FT3 10.1 (H) 06/17/2022 12:37 PM    FT3 4.7 (H) 03/21/2022 11:25 AM    FT3 3.7 01/03/2022 11:10 AM    FT3 9.1 (H) 11/29/2021 02:20 PM    L8LPDCT 231.40 (H) 09/08/2022 11:59 AM    TSI 3.84 (H) 08/31/2022 11:31 AM    TPOABS 11.0 08/31/2022 11:31 AM Lab Results   Component Value Date/Time    LABA1C 5.6 11/29/2021 02:20 PM    GLUCOSE 104 11/22/2022 09:57 AM    GLUCOSE 97 02/13/2012 10:20 AM     Lab Results   Component Value Date/Time    TRIG 92 11/22/2022 09:57 AM    HDL 51 11/22/2022 09:57 AM    LDLCALC 54 11/22/2022 09:57 AM    CHOL 123 11/22/2022 09:57 AM     Lab Results   Component Value Date/Time    VITD25 71 11/22/2022 09:57 AM    VITD25 30 11/11/2019 12:00 AM       ASSESSMENT & RECOMMENDATIONS   Pawel Willams, a 69 y.o.-old female seen in for management of following issues      Hyperthyroidism  Likely due to graves disease   Last TFTs showed overtreatment  Change Methimazole 10 mg  take 1/2 tablet daily for total of 5 mg per daily   Check TFT now and again in 2 weeks   Thyroid take and scan 9/2021 was consistent with gravs disease  Reviewed potential side effects of Methimazole, including agranulocytosis and liver dysfunction (cholestasis). Bone health/vitD deficiency    Discussed the effect of hyperthyroidism on bone health  Encourage taking vitD 2000 iu/day over the counter    I personally spent > 30 minutes  reviewing  external notes from PCP and other patient's care team providers, and personally interpreted labs associated with the above diagnosis. I also ordered labs to further assess and manage the above addressed medical conditions. Return in about 3 months (around 2/28/2023). The above issues were reviewed with the patient who understood and agreed with the plan. Thank you for allowing us to participate in the care of this patient. Please do not hesitate to contact us with any additional questions. Diagnosis Orders   1. Graves' disease        2. Hyperthyroidism  methIMAzole (TAPAZOLE) 10 MG tablet    T4, Free    TSH    T3, Free      3.  Vitamin D deficiency          Danielle Watters, APRN - CNS  [facility]   [address] [place] [address]   Phone: [phone]  Fax: [phone]  -------------------------  An electronic signature was used to authenticate this note. Vitor Lee, SEFERINO - CNS  on 11/30/2022 at 2:47 PM

## 2022-12-15 DIAGNOSIS — E78.2 MIXED HYPERLIPIDEMIA: ICD-10-CM

## 2022-12-15 RX ORDER — ATORVASTATIN CALCIUM 10 MG/1
10 TABLET, FILM COATED ORAL DAILY
Qty: 90 TABLET | Refills: 1 | OUTPATIENT
Start: 2022-12-15

## 2022-12-19 ENCOUNTER — OFFICE VISIT (OUTPATIENT)
Dept: PAIN MANAGEMENT | Age: 67
End: 2022-12-19
Payer: MEDICARE

## 2022-12-19 VITALS
SYSTOLIC BLOOD PRESSURE: 130 MMHG | OXYGEN SATURATION: 98 % | HEART RATE: 64 BPM | HEIGHT: 71 IN | TEMPERATURE: 96.8 F | DIASTOLIC BLOOD PRESSURE: 78 MMHG | BODY MASS INDEX: 25.06 KG/M2 | WEIGHT: 179 LBS | RESPIRATION RATE: 18 BRPM

## 2022-12-19 DIAGNOSIS — G89.29 CHRONIC BILATERAL LOW BACK PAIN WITHOUT SCIATICA: ICD-10-CM

## 2022-12-19 DIAGNOSIS — G89.4 CHRONIC PAIN SYNDROME: ICD-10-CM

## 2022-12-19 DIAGNOSIS — G71.3 MITOCHONDRIAL MYOPATHY: ICD-10-CM

## 2022-12-19 DIAGNOSIS — M47.816 LUMBAR FACET ARTHROPATHY: ICD-10-CM

## 2022-12-19 DIAGNOSIS — M47.816 LUMBAR SPONDYLOSIS: ICD-10-CM

## 2022-12-19 DIAGNOSIS — M54.16 LUMBAR RADICULOPATHY: Primary | ICD-10-CM

## 2022-12-19 DIAGNOSIS — M16.11 PRIMARY OSTEOARTHRITIS OF RIGHT HIP: ICD-10-CM

## 2022-12-19 DIAGNOSIS — M54.50 CHRONIC BILATERAL LOW BACK PAIN WITHOUT SCIATICA: ICD-10-CM

## 2022-12-19 DIAGNOSIS — M51.9 LUMBAR DISC DISORDER: ICD-10-CM

## 2022-12-19 DIAGNOSIS — F11.20 OPIOID DEPENDENCE WITH CURRENT USE (HCC): ICD-10-CM

## 2022-12-19 DIAGNOSIS — E05.90 HYPERTHYROIDISM: ICD-10-CM

## 2022-12-19 LAB
T3 FREE: 3.2 PG/ML (ref 2–4.4)
T4 FREE: 0.8 NG/DL (ref 0.93–1.7)
TSH SERPL DL<=0.05 MIU/L-ACNC: 11.83 UIU/ML (ref 0.27–4.2)

## 2022-12-19 PROCEDURE — 3017F COLORECTAL CA SCREEN DOC REV: CPT | Performed by: PHYSICIAN ASSISTANT

## 2022-12-19 PROCEDURE — G8484 FLU IMMUNIZE NO ADMIN: HCPCS | Performed by: PHYSICIAN ASSISTANT

## 2022-12-19 PROCEDURE — 99213 OFFICE O/P EST LOW 20 MIN: CPT | Performed by: PHYSICIAN ASSISTANT

## 2022-12-19 PROCEDURE — 3074F SYST BP LT 130 MM HG: CPT | Performed by: PHYSICIAN ASSISTANT

## 2022-12-19 PROCEDURE — G8427 DOCREV CUR MEDS BY ELIG CLIN: HCPCS | Performed by: PHYSICIAN ASSISTANT

## 2022-12-19 PROCEDURE — 1036F TOBACCO NON-USER: CPT | Performed by: PHYSICIAN ASSISTANT

## 2022-12-19 PROCEDURE — 3078F DIAST BP <80 MM HG: CPT | Performed by: PHYSICIAN ASSISTANT

## 2022-12-19 PROCEDURE — 1123F ACP DISCUSS/DSCN MKR DOCD: CPT | Performed by: PHYSICIAN ASSISTANT

## 2022-12-19 PROCEDURE — G8399 PT W/DXA RESULTS DOCUMENT: HCPCS | Performed by: PHYSICIAN ASSISTANT

## 2022-12-19 PROCEDURE — 1090F PRES/ABSN URINE INCON ASSESS: CPT | Performed by: PHYSICIAN ASSISTANT

## 2022-12-19 PROCEDURE — G8420 CALC BMI NORM PARAMETERS: HCPCS | Performed by: PHYSICIAN ASSISTANT

## 2022-12-19 RX ORDER — HYDROCODONE BITARTRATE AND ACETAMINOPHEN 7.5; 325 MG/1; MG/1
0.5 TABLET ORAL EVERY 8 HOURS PRN
Qty: 45 TABLET | Refills: 0 | Status: SHIPPED | OUTPATIENT
Start: 2022-12-19 | End: 2023-01-18

## 2022-12-19 NOTE — PROGRESS NOTES
[address]  [address] [facility] [employer], [address] [place] [address]  [phone]    Follow up Note      Singh Mackenzie     Date of Visit:  12/19/2022    CC:  Patient presents for follow up   Chief Complaint   Patient presents with    Back Pain             HPI:    Pain is unchanged. No new changes. Appropriate analgesia with current medications regimen: yes. Change in quality of symptoms:no. Medication side effects:none. Recent diagnostic testing:none. Recent interventional procedures:none. .    She has been on anticoagulation medications to include Plavix/ASA. The patient  has not been on herbal supplements. The patient is not diabetic. Imaging:   Lumbar spine MRI 2021  . Advanced degenerative change with slight grade 1 retrolisthesis at L2-3   and slight grade 1 anterolisthesis at L4-5. Mild levoscoliosis. 2. Moderate central canal stenosis at L3-4 with right-sided disc protrusion   causing mild right subarticular recess stenosis and moderate right neural   foraminal stenosis. 3. Mild to moderate central canal stenosis at L2-3 with small right-sided   disc herniation causing mild right subarticular recess and neural foraminal   stenosis. 4. Small left-sided disc protrusion at L4-5 causing mild left subarticular   recess stenosis and neural foraminal stenosis. No significant central canal   stenosis at this level. 5. Subchondral signal change about L2-3 with some some edema most likely   related to severe degenerative disc disease. Clinical correlation for signs   of infection is suggested. Right hip Xray 2021:  Mild-to-moderate osteoarthritis at the right hip. Lumbar spine Xray 2016:  1. Left convex scoliosis. 2. Multilevel degenerative spondylosis throughout the lumbar spine. 3. Facet arthritis from L2-S1.   4. No acute abnormality is identified. Previous treatments: Physical Therapy and medications. .                            Potential Aberrant Drug-Related Behavior:   No     Urine Drug Screening:  Urine screen 02/2022 showed no opioids which is consistent     OARRS report:  07/2022 consistent to 12/2022 consistent     Opioid Agreement:  Renewal date:03/2023       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[address]     Need for vaccination against Streptococcus pneumoniae 6/16/2022    Neuropathy        Past Surgical History:   Procedure Laterality Date    ABDOMINAL EXPLORATION SURGERY      APPENDECTOMY      BREAST BIOPSY      CHOLECYSTECTOMY      CORONARY ANGIOPLASTY WITH STENT PLACEMENT  2014    HYSTERECTOMY (CERVIX STATUS UNKNOWN)      NECK SURGERY      PAIN MANAGEMENT PROCEDURE N/A 02/07/2022    LUMBAR EPIDURAL STEROID INJECTION L3-4 WITH 80 DEPO performed by Keyon Campos MD at [address] [place] [address] [address] (CERVIX NOT REMOVED)      TONSILLECTOMY         Prior to Admission medications    Medication Sig Start Date End Date Taking? Authorizing Provider   methIMAzole (TAPAZOLE) 10 MG tablet Take 0.5 tablets by mouth daily Patient is taking one in the morning and 1/2 at night. Patient taking differently: Take 5 mg by mouth daily 11/30/22  Yes SEFERINO Barone   clopidogrel (PLAVIX) 75 MG tablet Take 1 tablet by mouth daily 11/22/22  Yes SEFERINO Yusuf CNP   atorvastatin (LIPITOR) 10 MG tablet Take 1 tablet by mouth daily 11/22/22  Yes SEFERINO Yusuf CNP   DULoxetine (CYMBALTA) 60 MG extended release capsule Take 1 capsule by mouth daily 11/22/22  Yes SEFERINO Yusuf CNP   metoprolol succinate (TOPROL XL) 50 MG extended release tablet Take 1 tablet by mouth in the morning and 1 tablet in the evening.  11/22/22  Yes Justino Lemus APRN - CNP   zoster recombinant adjuvanted vaccine [facility]) 50 MCG/0.5ML SUSR injection Inject 0.5 mLs into the muscle See Admin Instructions 1 dose now and repeat in 2-6 months 11/16/22 5/15/23 Yes SEFERINO Cedeño - CNP   cyclobenzaprine (FLEXERIL) 10 MG tablet Take 1 tablet by mouth daily 22  Yes MANUEL Phillips   gabapentin (NEURONTIN) 300 MG capsule Take 1 capsule by mouth 3 times daily for 270 days. 6/16/22 3/13/23 Yes SEFERINO Cedeño CNP   lidocaine (LIDODERM) 5 % APPLY 1 PATCH TO SKIN DAILY, 12 HOURS ON AND 12 HOURS OFF 22  Yes SEFERINO Peralta CNP   Coenzyme Q10 (COQ-10) 100 MG CAPS Take by mouth   Yes Historical Provider, MD   Cholecalciferol (VITAMIN D3) 50 MCG ( UT) CAPS Take by mouth   Yes Historical Provider, MD   Handicap Susi MISC Until 2026  Yes Rita Wall MD       Allergies   Allergen Reactions    Iodine Anaphylaxis and Hives    Evista [Raloxifene Hcl] Hives       Social History     Socioeconomic History    Marital status:       Spouse name: Not on file    Number of children: Not on file    Years of education: Not on file    Highest education level: Not on file   Occupational History    Not on file   Tobacco Use    Smoking status: Former     Packs/day: 1.00     Years: 27.00     Pack years: 27.00     Types: Cigarettes     Quit date:      Years since quittin.9    Smokeless tobacco: Never   Vaping Use    Vaping Use: Never used   Substance and Sexual Activity    Alcohol use: No    Drug use: No    Sexual activity: Not Currently   Other Topics Concern    Not on file   Social History Narrative    Not on file     Social Determinants of Health     Financial Resource Strain: Low Risk     Difficulty of Paying Living Expenses: Not hard at all   Food Insecurity: No Food Insecurity    Worried About [address] in the Last Year: Never true    [address] in the Last Year: Never true   Transportation Needs: No Transportation Needs    Lack of Transportation (Medical): No    Lack of Transportation (Non-Medical): No   Physical Activity: Sufficiently Active    Days of Exercise per Week: 5 days    Minutes of Exercise per Session: 30 min   Stress: Not on file   Social Connections: Not on file   Intimate Partner Violence: Not on file   Housing Stability: Not on file       Family History   Problem Relation Age of Onset    Early Death Mother     Heart Disease Mother     Other Mother 34        blood clot      Colon Cancer Father 80    Diabetes Maternal Uncle     Diabetes Maternal Grandmother        REVIEW OF SYSTEMS:     Ron Hdz denies fever/chills, chest pain, shortness of breath, new bowel or bladder complaints. All other review of systems was negative. PHYSICAL EXAMINATION:      /78   Pulse 64   Temp 96.8 °F (36 °C) (Infrared)   Resp 18   Ht 5' 11\" (1.803 m)   Wt 179 lb (81.2 kg)   SpO2 98%   BMI 24.97 kg/m²     General:      General appearance:   pleasant and well-hydrated. , in mild discomfort and A & O x3  Build:Normal    HEENT:    Head:normocephalic and atraumatic  Sclera: icterus absent,    Lungs:    Breathing:Normal expansion. No wheezing. Abdomen:    Shape:non-distended and normal    Lumbar spine:    Range of motion:abnormal mildly Lateral bending, flexion, extension rotation bilateral and is  painful. Extremities:    Tremors:None bilaterally upper and lower  Range of motion:Generally normal shoulders, pain with internal rotation of hips not done. Intact:Yes  Edema:Normal    Neurological:    [address]    Dermatology:    Skin:no unusual rashes, no skin lesions, no palpable subcutaneous nodules, and good skin turgor    Impression:    Low back pain with radiation to the right thigh and groin. Overall, doing well today. Plan:  Follow up on her low back and right hip pain with no acute issues, managed with current medications regimen.   Refill Norco 7.5 QD PRN(patient given 45 pills to last 6-8 weeks (last pill last night). Immense relief with Ztlido patches  NO NSAIDS on Plavix  Gabapentin 300 mg TID (through us now per request of PCP)/Cymbalta 60 mg QD by PCP  Continue with Flexeril 10 mg QD PRN. No RF. Buccal ordered today  OARRS report reviewed   Patient encouraged to stay active and to lose weight. Treatment plan discussed with the patient including medications side effects. Controlled Substance Monitoring:    Acute and Chronic Pain Monitoring:   RX Monitoring 12/19/2022   Periodic Controlled Substance Monitoring Possible medication side effects, risk of tolerance/dependence & alternative treatments discussed. ;No signs of potential drug abuse or diversion identified. ;Assessed functional status. ;Obtaining appropriate analgesic effect of treatment. ;Random urine drug screen sent today. We discussed with the patient that combining opioids, benzodiazepines, alcohol, illicit drugs or sleep aids increases the risk of respiratory depression including death. We discussed that these medications may cause drowsiness, sedation or dizziness and have counseled the patient not to drive or operate machinery. We have discussed that these medications will be prescribed only by one provider. We have discussed with the patient about age related risk factors and have thoroughly discussed the importance of taking these medications as prescribed. The patient verbalizes understanding.     ccreferring physic

## 2022-12-19 NOTE — PROGRESS NOTES
Do you currently have any of the following:    Fever: No  Headache:  No  Cough: No  Shortness of breath: No  Exposed to anyone with these symptoms: No                                                                                                                Singh Mackenzie presents to the [facility] on 12/19/2022. Hardy Vincent is complaining of pain in her lower back. The pain is constant. The pain is described as aching and sharp. Pain is rated on her best day at a 5, on her worst day at a 10, and on average at a 6 on the VAS scale. She took her last dose of Norco, Neurontin, and flexeril , and lidocaine patches today. Hardy Vincent does not have issues with constipation. Any procedures since your last visit: No,   She is on NSAIDS and  is  on anticoagulation medications to include Plavix and is managed by SEFERINO Mcfarlane CNP  . Pacemaker or defibrillator: No Physician managing device is NA. Medication Contract and Consent for Opioid Use Documents Filed        No documents found                       /78   Pulse 64   Temp 96.8 °F (36 °C) (Infrared)   Resp 18   Ht 5' 11\" (1.803 m)   Wt 179 lb (81.2 kg)   SpO2 98%   BMI 24.97 kg/m²      No LMP recorded. Patient has had a hysterectomy.

## 2022-12-20 DIAGNOSIS — E05.90 HYPERTHYROIDISM: Primary | ICD-10-CM

## 2022-12-22 ENCOUNTER — TELEPHONE (OUTPATIENT)
Dept: ENDOCRINOLOGY | Age: 67
End: 2022-12-22

## 2022-12-22 NOTE — TELEPHONE ENCOUNTER
----- Message from SEFERINO Gandara sent at 12/20/2022  8:19 AM EST -----  Please call patient and inform her thyroid function testing is still showing overtreatment. If patient is taking 10 mg half a tablet daily for a total of 5 mg please have patient's stop medication and recheck labs in 4 weeks.   Labs ordered

## 2022-12-27 ENCOUNTER — TELEPHONE (OUTPATIENT)
Dept: FAMILY MEDICINE CLINIC | Age: 67
End: 2022-12-27

## 2022-12-27 NOTE — TELEPHONE ENCOUNTER
----- Message from Khadijah Fregoso sent at 12/27/2022 11:04 AM EST -----  Subject: Appointment Request    Reason for Call: Established Patient Appointment needed: Routine Pre-Op    QUESTIONS    Reason for appointment request? Available appointments did not meet   patient need     Additional Information for Provider? biopsy for lesion under tongue,   surgery 1.18.22 Dr. Cheli Pavon with [facility]?, she stated she was   also just seen if the paper can just be dropped off thats fine as well   ---------------------------------------------------------------------------  --------------  Olu Esteban INFO  [phone]; OK to leave message on voicemail  ---------------------------------------------------------------------------  --------------  SCRIPT ANSWERS  COVID Screen: Gabriel Nettles

## 2022-12-27 NOTE — TELEPHONE ENCOUNTER
Per Marilynn: She was seen in November so she needs to be seen. Called pt and scheduled appt 1/10/23.

## 2023-01-04 ENCOUNTER — HOSPITAL ENCOUNTER (EMERGENCY)
Age: 68
Discharge: HOME | End: 2023-01-04
Payer: MEDICARE

## 2023-01-04 VITALS
WEIGHT: 175 LBS | TEMPERATURE: 98 F | HEART RATE: 87 BPM | BODY MASS INDEX: 24.5 KG/M2 | OXYGEN SATURATION: 98 % | SYSTOLIC BLOOD PRESSURE: 130 MMHG | HEIGHT: 71 IN | DIASTOLIC BLOOD PRESSURE: 69 MMHG | RESPIRATION RATE: 20 BRPM

## 2023-01-04 DIAGNOSIS — J06.9 ACUTE UPPER RESPIRATORY INFECTION: Primary | ICD-10-CM

## 2023-01-04 DIAGNOSIS — J20.9 ACUTE BRONCHITIS, UNSPECIFIED ORGANISM: ICD-10-CM

## 2023-01-04 LAB
INFLUENZA A BY PCR: NOT DETECTED
INFLUENZA B BY PCR: NOT DETECTED

## 2023-01-04 PROCEDURE — 99211 OFF/OP EST MAY X REQ PHY/QHP: CPT

## 2023-01-04 PROCEDURE — 87502 INFLUENZA DNA AMP PROBE: CPT

## 2023-01-04 RX ORDER — METHYLPREDNISOLONE 4 MG/1
TABLET ORAL
Qty: 21 TABLET | Refills: 0 | Status: SHIPPED | OUTPATIENT
Start: 2023-01-04

## 2023-01-04 RX ORDER — AZITHROMYCIN 250 MG/1
TABLET, FILM COATED ORAL
Qty: 6 TABLET | Refills: 0 | Status: SHIPPED | OUTPATIENT
Start: 2023-01-04 | End: 2023-01-14

## 2023-01-04 RX ORDER — DEXTROMETHORPHAN HYDROBROMIDE, GUAIFENESIN, PHENYLEPHRINE HYDROCHLORIDE 200; 5; 10 MG/1; MG/1; MG/1
TABLET, COATED ORAL
Qty: 20 TABLET | Refills: 0 | Status: SHIPPED | OUTPATIENT
Start: 2023-01-04

## 2023-01-04 ASSESSMENT — PAIN DESCRIPTION - PAIN TYPE: TYPE: ACUTE PAIN

## 2023-01-04 ASSESSMENT — PAIN SCALES - GENERAL: PAINLEVEL_OUTOF10: 6

## 2023-01-04 ASSESSMENT — PAIN DESCRIPTION - LOCATION: LOCATION: THROAT

## 2023-01-04 ASSESSMENT — PAIN DESCRIPTION - FREQUENCY: FREQUENCY: CONTINUOUS

## 2023-01-04 ASSESSMENT — PAIN DESCRIPTION - ONSET: ONSET: GRADUAL

## 2023-01-04 ASSESSMENT — PAIN - FUNCTIONAL ASSESSMENT: PAIN_FUNCTIONAL_ASSESSMENT: 0-10

## 2023-01-04 ASSESSMENT — PAIN DESCRIPTION - DESCRIPTORS: DESCRIPTORS: SORE

## 2023-01-04 NOTE — ED PROVIDER NOTES
[address] ENCOUNTER        Pt Name: Naveen Doshi  MRN: [id number]  [place] 1955  Date of evaluation: 1/4/2023  Provider: SEFERINO Maxwell CNP  PCP: SEFERINO Monzon CNP  Note Started: 1:59 PM EST 1/4/23    CHIEF COMPLAINT       Chief Complaint   Patient presents with    Head Congestion    Chest Congestion    Cough    Pharyngitis       HISTORY OF PRESENT ILLNESS: 1 or more Elements   History From: patient    Limitations to history : None    Naveen Doshi is a 69 y.o. female who presents evaluation. She got sick yesterday. She has nasal congestion chest congestion a sore throat cough and body aches. She said that her family was here over [place] and they all tested positive for influenza everyone tested negative for COVID. She is not complaining of chest pain. She is not complaining of any abdominal pain. Nursing Notes were all reviewed and agreed with or any disagreements were addressed in the HPI. REVIEW OF SYSTEMS :      Review of Systems    Positives and Pertinent negatives as per HPI.      SURGICAL HISTORY     Past Surgical History:   Procedure Laterality Date    ABDOMINAL EXPLORATION SURGERY      APPENDECTOMY      BREAST BIOPSY      CHOLECYSTECTOMY      CORONARY ANGIOPLASTY WITH STENT PLACEMENT  2014    HYSTERECTOMY (CERVIX STATUS UNKNOWN)      NECK SURGERY      PAIN MANAGEMENT PROCEDURE N/A 02/07/2022    LUMBAR EPIDURAL STEROID INJECTION L3-4 WITH 80 DEPO performed by Rajinder Larose MD at [address] [place] [address] [address] (CERVIX NOT REMOVED)      TONSILLECTOMY         CURRENTMEDICATIONS       Previous Medications    ATORVASTATIN (LIPITOR) 10 MG TABLET    Take 1 tablet by mouth daily    CHOLECALCIFEROL (VITAMIN D3) 50 MCG (2000 UT) CAPS    Take by mouth    CLOPIDOGREL (PLAVIX) 75 MG TABLET    Take 1 tablet by mouth daily    COENZYME Q10 (COQ-10) 100 MG CAPS    Take by mouth    CYCLOBENZAPRINE (FLEXERIL) 10 MG TABLET Take 1 tablet by mouth daily    DULOXETINE (CYMBALTA) 60 MG EXTENDED RELEASE CAPSULE    Take 1 capsule by mouth daily    GABAPENTIN (NEURONTIN) 300 MG CAPSULE    Take 1 capsule by mouth 3 times daily for 270 days. HANDICAP PLACARD [facility]    Until 2026    HYDROCODONE-ACETAMINOPHEN (NORCO) 7.5-325 MG PER TABLET    Take 0.5 tablets by mouth every 8 hours as needed for Pain for up to 30 days. Intended supply: 30 days    LIDOCAINE (LIDODERM) 5 %    APPLY 1 PATCH TO SKIN DAILY, 12 HOURS ON AND 12 HOURS OFF    METOPROLOL SUCCINATE (TOPROL XL) 50 MG EXTENDED RELEASE TABLET    Take 1 tablet by mouth in the morning and 1 tablet in the evening.     ZOSTER RECOMBINANT ADJUVANTED VACCINE (SHINGRIX) 50 MCG/0.5ML SUSR INJECTION    Inject 0.5 mLs into the muscle See Admin Instructions 1 dose now and repeat in 2-6 months       ALLERGIES     Iodine and Evista [raloxifene hcl]    FAMILYHISTORY       Family History   Problem Relation Age of Onset    Early Death Mother     Heart Disease Mother     Other Mother 34        blood clot      Colon Cancer Father 80    Diabetes Maternal Uncle     Diabetes Maternal Grandmother         SOCIAL HISTORY       Social History     Tobacco Use    Smoking status: Former     Packs/day: 1.00     Years: 27.00     Pack years: 27.00     Types: Cigarettes     Quit date:      Years since quittin.0    Smokeless tobacco: Never   Vaping Use    Vaping Use: Never used   Substance Use Topics    Alcohol use: No    Drug use: No       SCREENINGS                         CIWA Assessment  BP: 130/69  Heart Rate: 87           PHYSICAL EXAM  1 or more Elements     ED Triage Vitals [23 1325]   BP Temp Temp Source Heart Rate Resp SpO2 Height Weight   130/69 98 °F (36.7 °C) Infrared 87 20 98 % 5' 11\" (1.803 m) 175 lb (79.4 kg)       Physical Exam        Constitutional/General: Alert and oriented x3  Head: Normocephalic and atraumatic  Eyes:  conjunctiva normal, sclera non icteric  ENT:  Oropharynx clear, handling secretions, no trismus, no asymmetry of the posterior oropharynx or uvular edema  Neck: Supple, full ROM,   Respiratory: Lungs clear to auscultation bilaterally, no wheezes, rales, or rhonchi. Not in respiratory distress frequent harsh cough noted  Cardiovascular:  Regular rate. Regular rhythm. Chest: No chest wall tenderness  GI:  Abdomen Soft, Non tender, Non distended. +BS. No rebound, guarding, or rigidity. No pulsatile masses. Musculoskeletal: Moves all extremities x 4. Integument: skin warm and dry. No rashes. Neurologic: GCS 15, no focal deficits, Psychiatric: Normal Affect            DIAGNOSTIC RESULTS   LABS:    Labs Reviewed   RAPID INFLUENZA A/B ANTIGENS                 No orders to display     No results found. No results found. PROCEDURES   Unless otherwise noted below, none     Procedures    CRITICAL CARE TIME (.cct)       PAST MEDICAL HISTORY/Chronic Conditions Affecting Care      has a past medical history of Anxiety, CAD (coronary artery disease), Carotid artery stenosis, Chronic back pain, COPD (chronic obstructive pulmonary disease) ([facility] [address] Depression, GERD (gastroesophageal reflux disease), Hyperlipidemia, Hypertension, Hyperthyroidism, Mitochondrial disease ([facility] [address] Need for vaccination against Streptococcus pneumoniae (6/16/2022), and Neuropathy.      EMERGENCY DEPARTMENT COURSE    Vitals:    Vitals:    01/04/23 1325   BP: 130/69   Pulse: 87   Resp: 20   Temp: 98 °F (36.7 °C)   TempSrc: Infrared   SpO2: 98%   Weight: 175 lb (79.4 kg)   Height: 5' 11\" (1.803 m)       Patient was given the following medications:  Medications - No data to display                  Medical Decision Making/Differential Diagnosis:    CC/HPI Summary, Social Determinants of health, Records Reviewed, DDx, testing done/not done, ED Course, Reassessment, disposition considerations/shared decision making with patient, consults, disposition:        Been exposed to influenza from her family who are visiting over the [place] vacation she got sick 4 days ago. I did test her for influenza and it was negative the family tested for COVID at home and they were negative. She has been sick for 4 days has a harsh cough sinus congestion and drainage and she said the sinus drainage is green I did put her on a Z-Des, Medrol Dosepak and some mucinex  fast max. I advised her if she worsens or does not improve she should get reevaluated. CONSULTS: (Who and What was discussed)  None        I am the Primary Clinician of Record. FINAL IMPRESSION      1. Acute upper respiratory infection    2. Acute bronchitis, unspecified organism          DISPOSITION/PLAN     DISPOSITION Decision To Discharge 01/04/2023 02:39:34 PM      PATIENT REFERRED TO:  SEFERINO Amaya - [address] [address]  [phone]    Schedule an appointment as soon as possible for a visit       DISCHARGE MEDICATIONS:  New Prescriptions    AZITHROMYCIN (ZITHROMAX Z-DES) 250 MG TABLET    Take 2 tabs on day one, followed by 1 tablet daily for 4 days.     METHYLPREDNISOLONE (MEDROL, DES,) 4 MG TABLET    Take as directed on package insert days 1-6    PHENYLEPHRINE-DM-GG (MUCINEX FAST-MAX CONGEST COUGH) 5- MG TABS    Take as directed on the package for cough or congestion       DISCONTINUED MEDICATIONS:  Discontinued Medications    No medications on file              (Please note that portions of this note were completed with a voice recognition program.  Efforts were made to edit the dictations but occasionally words are mis-transcribed.)    SEFERINO Guidry CNP (electronically signed)          SEFERINO Guidry CNP  01/04/23 6960

## 2023-01-10 ENCOUNTER — OFFICE VISIT (OUTPATIENT)
Dept: FAMILY MEDICINE CLINIC | Age: 68
End: 2023-01-10

## 2023-01-10 VITALS
HEART RATE: 89 BPM | OXYGEN SATURATION: 99 % | WEIGHT: 178 LBS | DIASTOLIC BLOOD PRESSURE: 80 MMHG | RESPIRATION RATE: 20 BRPM | SYSTOLIC BLOOD PRESSURE: 136 MMHG | HEIGHT: 71 IN | BODY MASS INDEX: 24.92 KG/M2

## 2023-01-10 DIAGNOSIS — Z01.810 PREOP CARDIOVASCULAR EXAM: Primary | ICD-10-CM

## 2023-01-10 ASSESSMENT — ENCOUNTER SYMPTOMS
NAUSEA: 0
DIARRHEA: 0
VOMITING: 0
SHORTNESS OF BREATH: 0

## 2023-01-10 NOTE — PROGRESS NOTES
[address] [facility], [address]   [address] [facility]   Evi Saini MD     Patient: Jessica Willis Birth: 1955  Visit Date: 1/10/23    Adore Church is a 69y.o. year old female here today for   Chief Complaint   Patient presents with    Pre-op Exam       HPI  Patient here for surgery clearance for tongue biopsy. Patient denies anesthesia complications. Denies chest pain, shortness of breath. Review of Systems   Eyes:  Negative for visual disturbance. Respiratory:  Negative for shortness of breath. Cardiovascular:  Negative for chest pain, palpitations and leg swelling. Gastrointestinal:  Negative for diarrhea, nausea and vomiting. Genitourinary:  Negative for difficulty urinating, dysuria and frequency. Skin:  Negative for rash. Past medical, surgical, social and/or family historyreviewed, updated as needed, and are non-contributory (unless otherwise stated). Medications, allergies, and problem list also reviewed and updated as needed in patient's record. Current Outpatient Medications   Medication Sig Dispense Refill    HYDROcodone-acetaminophen (NORCO) 7.5-325 MG per tablet Take 0.5 tablets by mouth every 8 hours as needed for Pain for up to 30 days. Intended supply: 30 days 45 tablet 0    clopidogrel (PLAVIX) 75 MG tablet Take 1 tablet by mouth daily 90 tablet 1    atorvastatin (LIPITOR) 10 MG tablet Take 1 tablet by mouth daily 90 tablet 1    DULoxetine (CYMBALTA) 60 MG extended release capsule Take 1 capsule by mouth daily 90 capsule 1    metoprolol succinate (TOPROL XL) 50 MG extended release tablet Take 1 tablet by mouth in the morning and 1 tablet in the evening.  180 tablet 1    zoster recombinant adjuvanted vaccine (SHINGRIX) 50 MCG/0.5ML SUSR injection Inject 0.5 mLs into the muscle See Admin Instructions 1 dose now and repeat in 2-6 months 0.5 mL 1    cyclobenzaprine (FLEXERIL) 10 MG tablet Take 1 tablet by mouth daily 30 tablet 0    gabapentin (NEURONTIN) 300 MG capsule Take 1 capsule by mouth 3 times daily for 270 days. 270 capsule 3    lidocaine (LIDODERM) 5 % APPLY 1 PATCH TO SKIN DAILY, 12 HOURS ON AND 12 HOURS OFF 90 patch 0    Coenzyme Q10 (COQ-10) 100 MG CAPS Take by mouth      Cholecalciferol (VITAMIN D3) 50 MCG (2000 UT) CAPS Take by mouth      Handicap Susi [facility] Until 12/2/2026 1 each 0     No current facility-administered medications for this visit. Wt Readings from Last 3 Encounters:   01/10/23 178 lb (80.7 kg)   01/04/23 175 lb (79.4 kg)   12/19/22 179 lb (81.2 kg)                   Vitals:    01/10/23 0915   BP: 136/80   Pulse: 89   Resp: 20   SpO2: 99%       Physical Exam  Vitals reviewed. Constitutional:       Appearance: She is well-developed. Cardiovascular:      Rate and Rhythm: Normal rate and regular rhythm. Heart sounds: Normal heart sounds. No murmur heard. No friction rub. Pulmonary:      Effort: Pulmonary effort is normal. No respiratory distress. Breath sounds: Normal breath sounds. No wheezing or rales. Abdominal:      General: Bowel sounds are normal. There is no distension. Palpations: Abdomen is soft. Tenderness: There is no abdominal tenderness. There is no guarding or rebound. Skin:     General: Skin is warm and dry. Neurological:      Mental Status: She is alert. ASSESSMENT/PLAN  Viral Monroy was seen today for pre-op exam.    Diagnoses and all orders for this visit:    Preop cardiovascular exam  -     EKG 12 Lead: NSR, no ischemia  -     medically stable for upcoming surgery        /MyChart follow up if tests abnormal.    Return for scheduled appointment. or sooner if necessary. I have reviewed my findings and recommendations with Viral Monroy.      Tim Nelson MD, M.D

## 2023-01-31 ENCOUNTER — OFFICE VISIT (OUTPATIENT)
Dept: PAIN MANAGEMENT | Age: 68
End: 2023-01-31
Payer: MEDICARE

## 2023-01-31 VITALS
DIASTOLIC BLOOD PRESSURE: 74 MMHG | HEART RATE: 93 BPM | OXYGEN SATURATION: 92 % | HEIGHT: 71 IN | BODY MASS INDEX: 24.92 KG/M2 | SYSTOLIC BLOOD PRESSURE: 118 MMHG | WEIGHT: 178 LBS

## 2023-01-31 DIAGNOSIS — G89.4 CHRONIC PAIN SYNDROME: Primary | ICD-10-CM

## 2023-01-31 DIAGNOSIS — M54.16 LUMBAR RADICULOPATHY: ICD-10-CM

## 2023-01-31 DIAGNOSIS — G71.3 MITOCHONDRIAL MYOPATHY: ICD-10-CM

## 2023-01-31 DIAGNOSIS — M16.11 PRIMARY OSTEOARTHRITIS OF RIGHT HIP: ICD-10-CM

## 2023-01-31 DIAGNOSIS — M47.816 LUMBAR SPONDYLOSIS: ICD-10-CM

## 2023-01-31 PROCEDURE — G8399 PT W/DXA RESULTS DOCUMENT: HCPCS | Performed by: PHYSICIAN ASSISTANT

## 2023-01-31 PROCEDURE — 99213 OFFICE O/P EST LOW 20 MIN: CPT | Performed by: PHYSICIAN ASSISTANT

## 2023-01-31 PROCEDURE — G8420 CALC BMI NORM PARAMETERS: HCPCS | Performed by: PHYSICIAN ASSISTANT

## 2023-01-31 PROCEDURE — 1123F ACP DISCUSS/DSCN MKR DOCD: CPT | Performed by: PHYSICIAN ASSISTANT

## 2023-01-31 PROCEDURE — 1090F PRES/ABSN URINE INCON ASSESS: CPT | Performed by: PHYSICIAN ASSISTANT

## 2023-01-31 PROCEDURE — 3074F SYST BP LT 130 MM HG: CPT | Performed by: PHYSICIAN ASSISTANT

## 2023-01-31 PROCEDURE — 1036F TOBACCO NON-USER: CPT | Performed by: PHYSICIAN ASSISTANT

## 2023-01-31 PROCEDURE — 3017F COLORECTAL CA SCREEN DOC REV: CPT | Performed by: PHYSICIAN ASSISTANT

## 2023-01-31 PROCEDURE — 3078F DIAST BP <80 MM HG: CPT | Performed by: PHYSICIAN ASSISTANT

## 2023-01-31 PROCEDURE — G8484 FLU IMMUNIZE NO ADMIN: HCPCS | Performed by: PHYSICIAN ASSISTANT

## 2023-01-31 PROCEDURE — G8427 DOCREV CUR MEDS BY ELIG CLIN: HCPCS | Performed by: PHYSICIAN ASSISTANT

## 2023-01-31 RX ORDER — CYCLOBENZAPRINE HCL 10 MG
10 TABLET ORAL DAILY
Qty: 30 TABLET | Refills: 0 | Status: SHIPPED | OUTPATIENT
Start: 2023-01-31

## 2023-01-31 RX ORDER — HYDROCODONE BITARTRATE AND ACETAMINOPHEN 7.5; 325 MG/1; MG/1
0.5 TABLET ORAL EVERY 8 HOURS PRN
Qty: 45 TABLET | Refills: 0 | Status: SHIPPED | OUTPATIENT
Start: 2023-01-31 | End: 2023-03-02

## 2023-01-31 NOTE — PROGRESS NOTES
[address]  [address] [facility], [address] [place] [address] Jordy  [phone]    Follow up Note      Fan Herrera     Date of Visit:  1/31/2023    CC:  Patient presents for follow up   Chief Complaint   Patient presents with    Back Pain               HPI:    Pain is unchanged. No new changes. Appropriate analgesia with current medications regimen: yes. Change in quality of symptoms:no. Medication side effects:none. Recent diagnostic testing:none. Recent interventional procedures:none. .    She has been on anticoagulation medications to include Plavix/ASA. The patient  has not been on herbal supplements. The patient is not diabetic. Imaging:   Lumbar spine MRI 2021  . Advanced degenerative change with slight grade 1 retrolisthesis at L2-3   and slight grade 1 anterolisthesis at L4-5. Mild levoscoliosis. 2. Moderate central canal stenosis at L3-4 with right-sided disc protrusion   causing mild right subarticular recess stenosis and moderate right neural   foraminal stenosis. 3. Mild to moderate central canal stenosis at L2-3 with small right-sided   disc herniation causing mild right subarticular recess and neural foraminal   stenosis. 4. Small left-sided disc protrusion at L4-5 causing mild left subarticular   recess stenosis and neural foraminal stenosis. No significant central canal   stenosis at this level. 5. Subchondral signal change about L2-3 with some some edema most likely   related to severe degenerative disc disease. Clinical correlation for signs   of infection is suggested. Right hip Xray 2021:  Mild-to-moderate osteoarthritis at the right hip. Lumbar spine Xray 2016:  1. Left convex scoliosis. 2. Multilevel degenerative spondylosis throughout the lumbar spine. 3. Facet arthritis from L2-S1.   4. No acute abnormality is identified. Previous treatments: Physical Therapy and medications. .                            Potential Aberrant Drug-Related Behavior:   No     Urine Drug Screening:  Urine screen 02/2022 showed no opioids which is consistent  12/2022 Consistent     OARRS report:  07/2022 consistent to 01/2023 consistent     Opioid Agreement:  Renewal date:03/2023       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[facility] [address]     Need for vaccination against Streptococcus pneumoniae 6/16/2022    Neuropathy        Past Surgical History:   Procedure Laterality Date    ABDOMINAL EXPLORATION SURGERY      APPENDECTOMY      BREAST BIOPSY      CHOLECYSTECTOMY      CORONARY ANGIOPLASTY WITH STENT PLACEMENT  2014    HYSTERECTOMY (CERVIX STATUS UNKNOWN)      NECK SURGERY      PAIN MANAGEMENT PROCEDURE N/A 02/07/2022    LUMBAR EPIDURAL STEROID INJECTION L3-4 WITH 80 DEPO performed by Darling Blanc MD at [address] [place] [address] [address] (CERVIX NOT REMOVED)      TONSILLECTOMY         Prior to Admission medications    Medication Sig Start Date End Date Taking? Authorizing Provider   HYDROcodone-acetaminophen (NORCO) 7.5-325 MG per tablet Take 0.5 tablets by mouth every 8 hours as needed for Pain for up to 30 days.  Intended supply: 30 days 1/31/23 3/2/23 Yes MANUEL Neal   cyclobenzaprine (FLEXERIL) 10 MG tablet Take 1 tablet by mouth daily 1/31/23  Yes MANUEL Neal   clopidogrel (PLAVIX) 75 MG tablet Take 1 tablet by mouth daily 11/22/22  Yes SEFERINO Gamez CNP   atorvastatin (LIPITOR) 10 MG tablet Take 1 tablet by mouth daily 11/22/22  Yes SEFERINO Gamez CNP   DULoxetine (CYMBALTA) 60 MG extended release capsule Take 1 capsule by mouth daily 11/22/22  Yes SEFERINO Gamez CNP   metoprolol succinate (TOPROL XL) 50 MG extended release tablet Take 1 tablet by mouth in the morning and 1 tablet in the evening. 22  Yes SEFERINO Saldana CNP   zoster recombinant adjuvanted vaccine [facility]) 50 MCG/0.5ML SUSR injection Inject 0.5 mLs into the muscle See Admin Instructions 1 dose now and repeat in 2-6 months 11/16/22 5/15/23 Yes SEFERINO Harris - CNP   gabapentin (NEURONTIN) 300 MG capsule Take 1 capsule by mouth 3 times daily for 270 days. 6/16/22 3/13/23 Yes SEFERINO Harris - CNP   lidocaine (LIDODERM) 5 % APPLY 1 PATCH TO SKIN DAILY, 12 HOURS ON AND 12 HOURS OFF 22  Yes SEFERINO Antony CNP   Coenzyme Q10 (COQ-10) 100 MG CAPS Take by mouth   Yes Historical Provider, MD   Cholecalciferol (VITAMIN D3) 50 MCG ( UT) CAPS Take by mouth   Yes Historical Provider, MD   Handicap Susi [facility] Until 2026  Yes Ardyth Kocher, MD       Allergies   Allergen Reactions    Iodine Anaphylaxis and Hives    Evista [Raloxifene Hcl] Hives       Social History     Socioeconomic History    Marital status:       Spouse name: Not on file    Number of children: Not on file    Years of education: Not on file    Highest education level: Not on file   Occupational History    Not on file   Tobacco Use    Smoking status: Former     Packs/day: 1.00     Years: 27.00     Pack years: 27.00     Types: Cigarettes     Quit date:      Years since quittin.0    Smokeless tobacco: Never   Vaping Use    Vaping Use: Never used   Substance and Sexual Activity    Alcohol use: No    Drug use: No    Sexual activity: Not Currently   Other Topics Concern    Not on file   Social History Narrative    Not on file     Social Determinants of Health     Financial Resource Strain: Low Risk     Difficulty of Paying Living Expenses: Not hard at all   Food Insecurity: No Food Insecurity    Worried About [address] in the Last Year: Never true    [address] in the Last Year: Never true   Transportation Needs: No Transportation Needs    Lack of Transportation (Medical): No    Lack of Transportation (Non-Medical): No   Physical Activity: Sufficiently Active    Days of Exercise per Week: 5 days    Minutes of Exercise per Session: 30 min   Stress: Not on file   Social Connections: Not on file   Intimate Partner Violence: Not on file   Housing Stability: Not on file       Family History   Problem Relation Age of Onset    Early Death Mother     Heart Disease Mother     Other Mother 34        blood clot      Colon Cancer Father 80    Diabetes Maternal Uncle     Diabetes Maternal Grandmother        REVIEW OF SYSTEMS:     Pawel Eckert denies fever/chills, chest pain, shortness of breath, new bowel or bladder complaints. All other review of systems was negative. PHYSICAL EXAMINATION:      /74   Pulse 93   Ht 5' 11\" (1.803 m)   Wt 178 lb (80.7 kg)   SpO2 92%   BMI 24.83 kg/m²     General:      General appearance:   pleasant and well-hydrated. , in mild discomfort and A & O x3  Build:Normal    HEENT:    Head:normocephalic and atraumatic  Sclera: icterus absent,    Lungs:    Breathing:Normal expansion. No wheezing. Abdomen:    Shape:non-distended and normal    Lumbar spine:    Range of motion:abnormal mildly Lateral bending, flexion, extension rotation bilateral and is  painful. Extremities:    Tremors:None bilaterally upper and lower  Range of motion:Generally normal shoulders, pain with internal rotation of hips not done. Intact:Yes  Edema:Normal    Neurological:    [address]    Dermatology:    Skin:no unusual rashes, no skin lesions, no palpable subcutaneous nodules, and good skin turgor    Impression:    Low back pain with radiation to the right thigh and groin. Overall, doing well today. Plan:  Follow up on her low back and right hip pain with no acute issues, managed with current medications regimen. Refill Norco 7.5 QD PRN(patient given 45 pills to last 6-8 weeks (last pill last night).   Immense relief with Ztlido patches  NO NSAIDS on Plavix  Gabapentin 300 mg TID (through us now per request of PCP)/Cymbalta 60 mg QD by PCP  Continue with Flexeril 10 mg QD PRN. No RF. Buccal reviewed and is consistent  OARRS report reviewed   Patient encouraged to stay active and to lose weight. Treatment plan discussed with the patient including medications side effects. Controlled Substance Monitoring:    Acute and Chronic Pain Monitoring:   RX Monitoring 1/31/2023   Periodic Controlled Substance Monitoring Possible medication side effects, risk of tolerance/dependence & alternative treatments discussed. ;No signs of potential drug abuse or diversion identified. ;Assessed functional status. ;Obtaining appropriate analgesic effect of treatment. We discussed with the patient that combining opioids, benzodiazepines, alcohol, illicit drugs or sleep aids increases the risk of respiratory depression including death. We discussed that these medications may cause drowsiness, sedation or dizziness and have counseled the patient not to drive or operate machinery. We have discussed that these medications will be prescribed only by one provider. We have discussed with the patient about age related risk factors and have thoroughly discussed the importance of taking these medications as prescribed. The patient verbalizes understanding.     ccreferring physic

## 2023-01-31 NOTE — PROGRESS NOTES
Do you currently have any of the following:    Fever: No  Headache:  No  Cough: No  Shortness of breath: No  Exposed to anyone with these symptoms: No                                                                                                                Felice Díaz presents to the [facility] on 1/31/2023. Mitra Ricketts is complaining of pain in back. The pain is persistent. The pain is described as sharp. Pain is rated on her best day at a 5, on her worst day at a 10, and on average at a 6 on the VAS scale. She took her last dose of Norco last night. Mitra Ricketts does not have issues with constipation. Any procedures since your last visit: No.    She is not on NSAIDS and  is  on anticoagulation medications to include Plavix and is managed by Dr. Matilde Arellano. Pacemaker or defibrillator: No.    Medication Contract and Consent for Opioid Use Documents Filed        No documents found                       There were no vitals taken for this visit. No LMP recorded. Patient has had a hysterectomy.

## 2023-02-02 DIAGNOSIS — E05.90 HYPERTHYROIDISM: ICD-10-CM

## 2023-02-02 LAB
T4 FREE: 1.42 NG/DL (ref 0.93–1.7)
TSH SERPL DL<=0.05 MIU/L-ACNC: 0.07 UIU/ML (ref 0.27–4.2)

## 2023-02-03 DIAGNOSIS — E05.00 GRAVES' DISEASE: Primary | ICD-10-CM

## 2023-02-03 RX ORDER — METHIMAZOLE 5 MG/1
TABLET ORAL
Qty: 15 TABLET | Refills: 4 | Status: SHIPPED
Start: 2023-02-03 | End: 2023-03-23 | Stop reason: SDUPTHER

## 2023-02-06 ENCOUNTER — TELEPHONE (OUTPATIENT)
Dept: ENDOCRINOLOGY | Age: 68
End: 2023-02-06

## 2023-02-06 NOTE — TELEPHONE ENCOUNTER
----- Message from SEFERINO Joya sent at 2/3/2023  3:22 PM EST -----  Please call pt and inform her that thyroid function is showing mild hyperthyrodism. Please have pt restart methimazole but start 5 mg 1/2 tablet daily.   Recheck labs in 6 weeks

## 2023-02-24 DIAGNOSIS — G71.3 MITOCHONDRIAL MYOPATHY: ICD-10-CM

## 2023-02-24 RX ORDER — CYCLOBENZAPRINE HCL 10 MG
10 TABLET ORAL DAILY
Qty: 30 TABLET | Refills: 0 | OUTPATIENT
Start: 2023-02-24

## 2023-03-09 DIAGNOSIS — E05.00 GRAVES' DISEASE: ICD-10-CM

## 2023-03-09 LAB
T3 FREE: 2.8 PG/ML (ref 2–4.4)
T4 FREE: 1.09 NG/DL (ref 0.93–1.7)
TSH SERPL DL<=0.05 MIU/L-ACNC: 1.07 UIU/ML (ref 0.27–4.2)

## 2023-03-13 ENCOUNTER — TELEPHONE (OUTPATIENT)
Dept: ENDOCRINOLOGY | Age: 68
End: 2023-03-13

## 2023-03-13 NOTE — PROGRESS NOTES
[address]  [address] [facility], [address] [place] [address] Jordy  [phone]    Follow up Note      Denver Puentes     Date of Visit:  3/14/2023    CC:  Patient presents for follow up   Chief Complaint   Patient presents with    Lower Back Pain                 HPI:    Pain is a little worse due to the weather. Appropriate analgesia with current medications regimen: yes. Change in quality of symptoms:no. Medication side effects:none. Recent diagnostic testing:none. Recent interventional procedures:none. .    She has been on anticoagulation medications to include Plavix/ASA. The patient  has not been on herbal supplements. The patient is not diabetic. Imaging:   Lumbar spine MRI 2021  . Advanced degenerative change with slight grade 1 retrolisthesis at L2-3   and slight grade 1 anterolisthesis at L4-5. Mild levoscoliosis. 2. Moderate central canal stenosis at L3-4 with right-sided disc protrusion   causing mild right subarticular recess stenosis and moderate right neural   foraminal stenosis. 3. Mild to moderate central canal stenosis at L2-3 with small right-sided   disc herniation causing mild right subarticular recess and neural foraminal   stenosis. 4. Small left-sided disc protrusion at L4-5 causing mild left subarticular   recess stenosis and neural foraminal stenosis. No significant central canal   stenosis at this level. 5. Subchondral signal change about L2-3 with some some edema most likely   related to severe degenerative disc disease. Clinical correlation for signs   of infection is suggested. Right hip Xray 2021:  Mild-to-moderate osteoarthritis at the right hip. Lumbar spine Xray 2016:  1. Left convex scoliosis. 2. Multilevel degenerative spondylosis throughout the lumbar spine. 3. Facet arthritis from L2-S1.   4. No acute abnormality is identified. Previous treatments: Physical Therapy and medications. Alexander Lua Potential Aberrant Drug-Related Behavior:   No     Urine Drug Screening:  Urine screen 02/2022 showed no opioids which is consistent  12/2022 Consistent     OARRS report:  07/2022 consistent to 03/2023 consistent     Opioid Agreement:  Needs updated today. 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[address]     Need for vaccination against Streptococcus pneumoniae 6/16/2022    Neuropathy        Past Surgical History:   Procedure Laterality Date    ABDOMINAL EXPLORATION SURGERY      APPENDECTOMY      BREAST BIOPSY      CHOLECYSTECTOMY      CORONARY ANGIOPLASTY WITH STENT PLACEMENT  2014    HYSTERECTOMY (CERVIX STATUS UNKNOWN)      NECK SURGERY      PAIN MANAGEMENT PROCEDURE N/A 02/07/2022    LUMBAR EPIDURAL STEROID INJECTION L3-4 WITH 80 DEPO performed by Lakisha Dickens MD at [address] [place] [address] [address] (CERVIX NOT REMOVED)      TONSILLECTOMY         Prior to Admission medications    Medication Sig Start Date End Date Taking? Authorizing Provider   HYDROcodone-acetaminophen (NORCO) 7.5-325 MG per tablet Take 0.5 tablets by mouth every 8 hours as needed for Pain for up to 30 days.  Intended supply: 30 days 3/14/23 4/13/23 Yes MANUEL Stratton   methIMAzole (TAPAZOLE) 5 MG tablet Take 1/2 tablet daily 2/3/23  Yes SEFERINO Rubalcava   cyclobenzaprine (FLEXERIL) 10 MG tablet Take 1 tablet by mouth daily 1/31/23  Yes AMNUEL Stratton   clopidogrel (PLAVIX) 75 MG tablet Take 1 tablet by mouth daily 11/22/22  Yes SEFERINO Bonilla CNP   atorvastatin (LIPITOR) 10 MG tablet Take 1 tablet by mouth daily 11/22/22  Yes SEFERINO Bonilla CNP   DULoxetine (CYMBALTA) 60 MG extended release capsule Take 1 capsule by mouth daily 22  Yes SEFERINO Dunn CNP   metoprolol succinate (TOPROL XL) 50 MG extended release tablet Take 1 tablet by mouth in the morning and 1 tablet in the evening. 22  Yes SEFERINO Dunn CNP   lidocaine (LIDODERM) 5 % APPLY 1 PATCH TO SKIN DAILY, 12 HOURS ON AND 12 HOURS OFF 22  Yes SEFERINO Almaraz CNP   Coenzyme Q10 (COQ-10) 100 MG CAPS Take by mouth   Yes Historical Provider, MD   Cholecalciferol (VITAMIN D3) 50 MCG (2000) CAPS Take by mouth   Yes Historical Provider, MD   Handtrinaflori Susi [address] [facility] Until 2026  Yes Francisco Javier Momin MD   zoster recombinant adjuvanted vaccine [facility]) 50 MCG/0.5ML SUSR injection Inject 0.5 mLs into the muscle See Admin Instructions 1 dose now and repeat in 2-6 months  Patient not taking: Reported on 3/14/2023 11/16/22 5/15/23  SEFERINO Lane Aas, CNP   gabapentin (NEURONTIN) 300 MG capsule Take 1 capsule by mouth 3 times daily for 270 days. 6/16/22 3/13/23  SEFERINO Dunn CNP       Allergies   Allergen Reactions    Iodine Anaphylaxis and Hives    Evista [Raloxifene Hcl] Hives       Social History     Socioeconomic History    Marital status:       Spouse name: Not on file    Number of children: Not on file    Years of education: Not on file    Highest education level: Not on file   Occupational History    Not on file   Tobacco Use    Smoking status: Former     Packs/day: 1.00     Years: 27.00     Pack years: 27.00     Types: Cigarettes     Quit date:      Years since quittin.2    Smokeless tobacco: Never   Vaping Use    Vaping Use: Never used   Substance and Sexual Activity    Alcohol use: No    Drug use: No    Sexual activity: Not Currently   Other Topics Concern    Not on file   Social History Narrative    Not on file     Social Determinants of Health     Financial Resource Strain: Low Risk     Difficulty of Paying Living Expenses: Not hard at all   Food Insecurity: No Food Insecurity Worried About [address] in the Last Year: Never true    [facility] in the Last Year: Never true   Transportation Needs: No Transportation Needs    Lack of Transportation (Medical): No    Lack of Transportation (Non-Medical): No   Physical Activity: Sufficiently Active    Days of Exercise per Week: 5 days    Minutes of Exercise per Session: 30 min   Stress: Not on file   Social Connections: Not on file   Intimate Partner Violence: Not on file   Housing Stability: Not on file       Family History   Problem Relation Age of Onset    Early Death Mother     Heart Disease Mother     Other Mother 34        blood clot      Colon Cancer Father 80    Diabetes Maternal Uncle     Diabetes Maternal Grandmother        REVIEW OF SYSTEMS:     Gaetano Santana denies fever/chills, chest pain, shortness of breath, new bowel or bladder complaints. All other review of systems was negative. PHYSICAL EXAMINATION:      BP (!) 140/80   Pulse 83   Temp 97.3 °F (36.3 °C) (Infrared)   Resp 18   Ht 5' 11\" (1.803 m)   Wt 178 lb (80.7 kg)   SpO2 93%   BMI 24.83 kg/m²     General:      General appearance:   pleasant and well-hydrated. , in mild discomfort and A & O x3  Build:Normal    HEENT:    Head:normocephalic and atraumatic  Sclera: icterus absent,    Lungs:    Breathing:Normal expansion. No wheezing. Abdomen:    Shape:non-distended and normal    Lumbar spine:    Range of motion:abnormal mildly Lateral bending, flexion, extension rotation bilateral and is  painful. Extremities:    Tremors:None bilaterally upper and lower  Range of motion:Generally normal shoulders, pain with internal rotation of hips not done. Intact:Yes  Edema:Normal    Neurological:    [address]    Dermatology:    Skin:no unusual rashes, no skin lesions, no palpable subcutaneous nodules, and good skin turgor    Impression:    Low back pain with radiation to the right thigh and groin. Overall, doing well today.      Plan:  Follow up on her low back and right hip pain with no acute issues, managed with current medications regimen. Refill Norco 7.5 QD PRN(patient given 45 pills to last 6-8 weeks . Immense relief with Ztlido patches  NO NSAIDS on Plavix  Gabapentin 300 mg TID (through us now per request of PCP)/Cymbalta 60 mg QD by PCP  Continue with Flexeril 10 mg QD PRN. No RF. OARRS report reviewed   Patient encouraged to stay active and to lose weight. Treatment plan discussed with the patient including medications side effects. Controlled Substance Monitoring:    Acute and Chronic Pain Monitoring:   RX Monitoring 3/14/2023   Periodic Controlled Substance Monitoring Possible medication side effects, risk of tolerance/dependence & alternative treatments discussed. ;No signs of potential drug abuse or diversion identified. ;Assessed functional status. ;Obtaining appropriate analgesic effect of treatment. We discussed with the patient that combining opioids, benzodiazepines, alcohol, illicit drugs or sleep aids increases the risk of respiratory depression including death. We discussed that these medications may cause drowsiness, sedation or dizziness and have counseled the patient not to drive or operate machinery. We have discussed that these medications will be prescribed only by one provider. We have discussed with the patient about age related risk factors and have thoroughly discussed the importance of taking these medications as prescribed. The patient verbalizes understanding.     ccreferring physic

## 2023-03-13 NOTE — TELEPHONE ENCOUNTER
----- Message from Wendy Kanner, APRN - CNS sent at 3/10/2023  9:58 AM EST -----  Please call pt and inform her thyroid function is normal.  Continue same

## 2023-03-14 ENCOUNTER — OFFICE VISIT (OUTPATIENT)
Dept: PAIN MANAGEMENT | Age: 68
End: 2023-03-14
Payer: MEDICARE

## 2023-03-14 VITALS
HEIGHT: 71 IN | SYSTOLIC BLOOD PRESSURE: 140 MMHG | OXYGEN SATURATION: 93 % | RESPIRATION RATE: 18 BRPM | HEART RATE: 83 BPM | WEIGHT: 178 LBS | DIASTOLIC BLOOD PRESSURE: 80 MMHG | TEMPERATURE: 97.3 F | BODY MASS INDEX: 24.92 KG/M2

## 2023-03-14 DIAGNOSIS — M16.11 PRIMARY OSTEOARTHRITIS OF RIGHT HIP: ICD-10-CM

## 2023-03-14 DIAGNOSIS — M47.816 LUMBAR SPONDYLOSIS: ICD-10-CM

## 2023-03-14 DIAGNOSIS — F11.20 OPIOID DEPENDENCE WITH CURRENT USE (HCC): ICD-10-CM

## 2023-03-14 DIAGNOSIS — G89.29 CHRONIC BILATERAL LOW BACK PAIN WITHOUT SCIATICA: ICD-10-CM

## 2023-03-14 DIAGNOSIS — M51.9 LUMBAR DISC DISORDER: ICD-10-CM

## 2023-03-14 DIAGNOSIS — M47.816 LUMBAR FACET ARTHROPATHY: ICD-10-CM

## 2023-03-14 DIAGNOSIS — M54.50 CHRONIC BILATERAL LOW BACK PAIN WITHOUT SCIATICA: ICD-10-CM

## 2023-03-14 DIAGNOSIS — G89.4 CHRONIC PAIN SYNDROME: Primary | ICD-10-CM

## 2023-03-14 DIAGNOSIS — G71.3 MITOCHONDRIAL MYOPATHY: ICD-10-CM

## 2023-03-14 DIAGNOSIS — M54.16 LUMBAR RADICULOPATHY: ICD-10-CM

## 2023-03-14 PROCEDURE — 99213 OFFICE O/P EST LOW 20 MIN: CPT | Performed by: PHYSICIAN ASSISTANT

## 2023-03-14 RX ORDER — HYDROCODONE BITARTRATE AND ACETAMINOPHEN 7.5; 325 MG/1; MG/1
0.5 TABLET ORAL EVERY 8 HOURS PRN
Qty: 45 TABLET | Refills: 0 | Status: SHIPPED | OUTPATIENT
Start: 2023-03-14 | End: 2023-04-13

## 2023-03-14 NOTE — PROGRESS NOTES
Do you currently have any of the following:    Fever: No  Headache:  No  Cough: No  Shortness of breath: No  Exposed to anyone with these symptoms: No                                                                                                                Dewitt Cogan presents to the [facility] on 3/14/2023. Houston Dupont is complaining of pain in her lower back that radiates to right hip. The pain is constant. The pain is described as sharp. Pain is rated on her best day at a 5, on her worst day at a 10, and on average at a 5 on the VAS scale. She took her last dose of Norco, Neurontin, and flexeril  yesterday. Houston uDpont does not have issues with constipation. Any procedures since your last visit: No,     She is not on NSAIDS and  is  on anticoagulation medications to include Plavix and is managed by Dr. Tucker Jack. Pacemaker or defibrillator: No Physician managing device is NA. Medication Contract and Consent for Opioid Use Documents Filed        No documents found                       Temp 97.3 °F (36.3 °C) (Infrared)   Resp 18   Ht 5' 11\" (1.803 m)   Wt 178 lb (80.7 kg)   BMI 24.83 kg/m²      No LMP recorded. Patient has had a hysterectomy.

## 2023-03-23 ENCOUNTER — OFFICE VISIT (OUTPATIENT)
Dept: ENDOCRINOLOGY | Age: 68
End: 2023-03-23
Payer: MEDICARE

## 2023-03-23 VITALS
SYSTOLIC BLOOD PRESSURE: 113 MMHG | BODY MASS INDEX: 25.38 KG/M2 | HEART RATE: 79 BPM | OXYGEN SATURATION: 96 % | WEIGHT: 182 LBS | DIASTOLIC BLOOD PRESSURE: 70 MMHG

## 2023-03-23 DIAGNOSIS — E05.00 GRAVES' DISEASE: ICD-10-CM

## 2023-03-23 DIAGNOSIS — E55.9 VITAMIN D DEFICIENCY: ICD-10-CM

## 2023-03-23 DIAGNOSIS — E05.90 HYPERTHYROIDISM: Primary | ICD-10-CM

## 2023-03-23 PROCEDURE — 1123F ACP DISCUSS/DSCN MKR DOCD: CPT | Performed by: INTERNAL MEDICINE

## 2023-03-23 PROCEDURE — G8484 FLU IMMUNIZE NO ADMIN: HCPCS | Performed by: INTERNAL MEDICINE

## 2023-03-23 PROCEDURE — G8399 PT W/DXA RESULTS DOCUMENT: HCPCS | Performed by: INTERNAL MEDICINE

## 2023-03-23 PROCEDURE — 3078F DIAST BP <80 MM HG: CPT | Performed by: INTERNAL MEDICINE

## 2023-03-23 PROCEDURE — 1090F PRES/ABSN URINE INCON ASSESS: CPT | Performed by: INTERNAL MEDICINE

## 2023-03-23 PROCEDURE — 99214 OFFICE O/P EST MOD 30 MIN: CPT | Performed by: INTERNAL MEDICINE

## 2023-03-23 PROCEDURE — G8427 DOCREV CUR MEDS BY ELIG CLIN: HCPCS | Performed by: INTERNAL MEDICINE

## 2023-03-23 PROCEDURE — 3017F COLORECTAL CA SCREEN DOC REV: CPT | Performed by: INTERNAL MEDICINE

## 2023-03-23 PROCEDURE — 1036F TOBACCO NON-USER: CPT | Performed by: INTERNAL MEDICINE

## 2023-03-23 PROCEDURE — G8417 CALC BMI ABV UP PARAM F/U: HCPCS | Performed by: INTERNAL MEDICINE

## 2023-03-23 PROCEDURE — 3074F SYST BP LT 130 MM HG: CPT | Performed by: INTERNAL MEDICINE

## 2023-03-23 RX ORDER — METHIMAZOLE 5 MG/1
TABLET ORAL
Qty: 45 TABLET | Refills: 3 | Status: SHIPPED | OUTPATIENT
Start: 2023-03-23

## 2023-03-23 NOTE — PROGRESS NOTES
examination:  General: awake alert, oriented x3, no abnormal position or movements. HEENT: normocephalic non traumatic, no exophthalmos, no lid lag, no lid retraction   Neck: supple, no LN enlargement, no thyromegaly, no thyroid tenderness, no thyroid bruit, no JVD. Pulm: Clear equal air entry no added sounds, no wheezing or rhonchi    CVS: S1 + S2, no murmur, no heave. Dorsalis pedis pulse palpable   Abd: soft lax, no tenderness, no organomegaly, audible bowel sounds. Skin: warm, no lesions, no rash.  No palmar erythema, no onycholysis, no pretibial Myxoedema, no acropachy   Musculoskeletal: No back tenderness, no kyphosis/scoliosis    Neuro: CN intact, sensation notmal , muscle power normal. No tremors   Psych: normal mood, and affect    Review of Laboratory Data:  I have reviewed the following:  Lab Results   Component Value Date/Time    WBC 5.3 11/22/2022 09:57 AM    RBC 4.84 11/22/2022 09:57 AM    HGB 14.7 11/22/2022 09:57 AM    HCT 45.2 11/22/2022 09:57 AM    MCV 93.4 11/22/2022 09:57 AM    MCH 30.4 11/22/2022 09:57 AM    MCHC 32.5 11/22/2022 09:57 AM    RDW 15.1 (H) 11/22/2022 09:57 AM     11/22/2022 09:57 AM    MPV 10.3 11/22/2022 09:57 AM      Lab Results   Component Value Date/Time     11/22/2022 09:57 AM    K 3.6 11/22/2022 09:57 AM    CO2 26 11/22/2022 09:57 AM    BUN 14 11/22/2022 09:57 AM    CREATININE 0.6 11/22/2022 09:57 AM    CALCIUM 9.2 11/22/2022 09:57 AM    LABGLOM >60 11/22/2022 09:57 AM    GFRAA >60 03/21/2022 11:25 AM      Lab Results   Component Value Date/Time    TSH 1.070 03/09/2023 10:46 AM    T4FREE 1.09 03/09/2023 10:46 AM    U6ORWGV 11.8 (H) 09/08/2022 11:59 AM    FT3 2.8 03/09/2023 10:46 AM    FT3 3.2 12/19/2022 01:54 PM    FT3 10.1 (H) 06/17/2022 12:37 PM    FT3 4.7 (H) 03/21/2022 11:25 AM    Q9ZMOZA 231.40 (H) 09/08/2022 11:59 AM    TSI 3.84 (H) 08/31/2022 11:31 AM    TPOABS 11.0 08/31/2022 11:31 AM     Lab Results   Component Value Date/Time    LABA1C 5.6 11/29/2021

## 2023-05-03 ENCOUNTER — OFFICE VISIT (OUTPATIENT)
Dept: PAIN MANAGEMENT | Age: 68
End: 2023-05-03
Payer: MEDICARE

## 2023-05-03 VITALS
OXYGEN SATURATION: 95 % | TEMPERATURE: 97.1 F | HEART RATE: 79 BPM | HEIGHT: 71 IN | RESPIRATION RATE: 18 BRPM | SYSTOLIC BLOOD PRESSURE: 128 MMHG | WEIGHT: 182 LBS | BODY MASS INDEX: 25.48 KG/M2 | DIASTOLIC BLOOD PRESSURE: 80 MMHG

## 2023-05-03 DIAGNOSIS — M54.16 LUMBAR RADICULOPATHY: ICD-10-CM

## 2023-05-03 DIAGNOSIS — M47.816 LUMBAR FACET ARTHROPATHY: ICD-10-CM

## 2023-05-03 DIAGNOSIS — M54.50 CHRONIC BILATERAL LOW BACK PAIN WITHOUT SCIATICA: ICD-10-CM

## 2023-05-03 DIAGNOSIS — G89.29 CHRONIC BILATERAL LOW BACK PAIN WITHOUT SCIATICA: ICD-10-CM

## 2023-05-03 DIAGNOSIS — M51.9 LUMBAR DISC DISORDER: ICD-10-CM

## 2023-05-03 DIAGNOSIS — F11.20 OPIOID DEPENDENCE WITH CURRENT USE (HCC): ICD-10-CM

## 2023-05-03 DIAGNOSIS — M47.816 LUMBAR SPONDYLOSIS: Primary | ICD-10-CM

## 2023-05-03 DIAGNOSIS — M16.11 PRIMARY OSTEOARTHRITIS OF RIGHT HIP: ICD-10-CM

## 2023-05-03 DIAGNOSIS — G89.4 CHRONIC PAIN SYNDROME: ICD-10-CM

## 2023-05-03 PROCEDURE — 1090F PRES/ABSN URINE INCON ASSESS: CPT | Performed by: PHYSICIAN ASSISTANT

## 2023-05-03 PROCEDURE — 1123F ACP DISCUSS/DSCN MKR DOCD: CPT | Performed by: PHYSICIAN ASSISTANT

## 2023-05-03 PROCEDURE — G8427 DOCREV CUR MEDS BY ELIG CLIN: HCPCS | Performed by: PHYSICIAN ASSISTANT

## 2023-05-03 PROCEDURE — 99213 OFFICE O/P EST LOW 20 MIN: CPT | Performed by: PHYSICIAN ASSISTANT

## 2023-05-03 PROCEDURE — 3074F SYST BP LT 130 MM HG: CPT | Performed by: PHYSICIAN ASSISTANT

## 2023-05-03 PROCEDURE — 3079F DIAST BP 80-89 MM HG: CPT | Performed by: PHYSICIAN ASSISTANT

## 2023-05-03 PROCEDURE — G8399 PT W/DXA RESULTS DOCUMENT: HCPCS | Performed by: PHYSICIAN ASSISTANT

## 2023-05-03 PROCEDURE — 3017F COLORECTAL CA SCREEN DOC REV: CPT | Performed by: PHYSICIAN ASSISTANT

## 2023-05-03 PROCEDURE — 1036F TOBACCO NON-USER: CPT | Performed by: PHYSICIAN ASSISTANT

## 2023-05-03 PROCEDURE — G8417 CALC BMI ABV UP PARAM F/U: HCPCS | Performed by: PHYSICIAN ASSISTANT

## 2023-05-03 RX ORDER — HYDROCODONE BITARTRATE AND ACETAMINOPHEN 7.5; 325 MG/1; MG/1
0.5 TABLET ORAL EVERY 8 HOURS PRN
Qty: 45 TABLET | Refills: 0 | Status: SHIPPED | OUTPATIENT
Start: 2023-05-03 | End: 2023-06-02

## 2023-05-03 NOTE — PROGRESS NOTES
Ivar Osgood presents to the [address] [address] on 5/3/2023. Grace Salvador is complaining of pain in her lower back. The pain is constant. The pain is described as aching. Pain is rated on her best day at a 4, on her worst day at a 10, and on average at a 5 on the VAS scale. She took her last dose of Norco, Neurontin, and flexeril  yesterday. Grace Salvador does not have issues with constipation. Any procedures since your last visit: No,       She is not on NSAIDS and  is  on anticoagulation medications to include Plavix and is managed by SEFERINO Marroquin CNP  . Pacemaker or defibrillator: No Physician managing device is NA. Medication Contract and Consent for Opioid Use Documents Filed       Patient Documents       Type of Document Status Date Received Received By Description    Consent Opioid Use Received 3/14/2023  1:25 PM GET SIMS pt agreement 3/14/23                       Temp 97.1 °F (36.2 °C) (Infrared)   Resp 18   Ht 5' 11\" (1.803 m)   Wt 182 lb (82.6 kg)   BMI 25.38 kg/m²      No LMP recorded. Patient has had a hysterectomy.

## 2023-05-03 NOTE — PROGRESS NOTES
[address]  [address] [facility], [address] [place] [address] Jordy  [phone]    Follow up Note      Joan Angel     Date of Visit:  5/3/2023    CC:  Patient presents for follow up   Chief Complaint   Patient presents with    Back Pain       HPI:    Pain is a little worse due to the weather. Appropriate analgesia with current medications regimen: yes. Change in quality of symptoms:no. Medication side effects:none. Recent diagnostic testing:none. Recent interventional procedures:none. .    She has been on anticoagulation medications to include Plavix/ASA. The patient  has not been on herbal supplements. The patient is not diabetic. Imaging:   Lumbar spine MRI 2021  . Advanced degenerative change with slight grade 1 retrolisthesis at L2-3   and slight grade 1 anterolisthesis at L4-5. Mild levoscoliosis. 2. Moderate central canal stenosis at L3-4 with right-sided disc protrusion   causing mild right subarticular recess stenosis and moderate right neural   foraminal stenosis. 3. Mild to moderate central canal stenosis at L2-3 with small right-sided   disc herniation causing mild right subarticular recess and neural foraminal   stenosis. 4. Small left-sided disc protrusion at L4-5 causing mild left subarticular   recess stenosis and neural foraminal stenosis. No significant central canal   stenosis at this level. 5. Subchondral signal change about L2-3 with some some edema most likely   related to severe degenerative disc disease. Clinical correlation for signs   of infection is suggested. Right hip Xray 2021:  Mild-to-moderate osteoarthritis at the right hip. Lumbar spine Xray 2016:  1. Left convex scoliosis. 2. Multilevel degenerative spondylosis throughout the lumbar spine. 3. Facet arthritis from L2-S1.   4. No acute abnormality is identified. Previous treatments: Physical Therapy and medications. .                            Potential

## 2023-05-17 ENCOUNTER — OFFICE VISIT (OUTPATIENT)
Dept: FAMILY MEDICINE CLINIC | Age: 68
End: 2023-05-17

## 2023-05-17 VITALS
HEART RATE: 83 BPM | OXYGEN SATURATION: 96 % | HEIGHT: 71 IN | RESPIRATION RATE: 16 BRPM | SYSTOLIC BLOOD PRESSURE: 128 MMHG | BODY MASS INDEX: 25.62 KG/M2 | TEMPERATURE: 97.3 F | WEIGHT: 183 LBS | DIASTOLIC BLOOD PRESSURE: 76 MMHG

## 2023-05-17 DIAGNOSIS — I10 BENIGN ESSENTIAL HYPERTENSION: Primary | ICD-10-CM

## 2023-05-17 DIAGNOSIS — J44.9 CHRONIC OBSTRUCTIVE PULMONARY DISEASE, UNSPECIFIED COPD TYPE (HCC): ICD-10-CM

## 2023-05-17 DIAGNOSIS — Z95.5 STENTED CORONARY ARTERY: ICD-10-CM

## 2023-05-17 DIAGNOSIS — F41.8 ANXIETY WITH DEPRESSION: ICD-10-CM

## 2023-05-17 DIAGNOSIS — E78.2 MIXED HYPERLIPIDEMIA: ICD-10-CM

## 2023-05-17 RX ORDER — DULOXETIN HYDROCHLORIDE 60 MG/1
60 CAPSULE, DELAYED RELEASE ORAL DAILY
Qty: 90 CAPSULE | Refills: 1 | Status: SHIPPED | OUTPATIENT
Start: 2023-05-17

## 2023-05-17 RX ORDER — CLOPIDOGREL BISULFATE 75 MG/1
75 TABLET ORAL DAILY
Qty: 90 TABLET | Refills: 1 | Status: SHIPPED | OUTPATIENT
Start: 2023-05-17

## 2023-05-17 RX ORDER — ATORVASTATIN CALCIUM 10 MG/1
10 TABLET, FILM COATED ORAL DAILY
Qty: 90 TABLET | Refills: 1 | Status: SHIPPED | OUTPATIENT
Start: 2023-05-17

## 2023-05-17 RX ORDER — METOPROLOL SUCCINATE 50 MG/1
50 TABLET, EXTENDED RELEASE ORAL 2 TIMES DAILY
Qty: 180 TABLET | Refills: 1 | Status: SHIPPED | OUTPATIENT
Start: 2023-05-17

## 2023-05-17 SDOH — ECONOMIC STABILITY: FOOD INSECURITY: WITHIN THE PAST 12 MONTHS, THE FOOD YOU BOUGHT JUST DIDN'T LAST AND YOU DIDN'T HAVE MONEY TO GET MORE.: NEVER TRUE

## 2023-05-17 SDOH — ECONOMIC STABILITY: INCOME INSECURITY: HOW HARD IS IT FOR YOU TO PAY FOR THE VERY BASICS LIKE FOOD, HOUSING, MEDICAL CARE, AND HEATING?: NOT HARD AT ALL

## 2023-05-17 SDOH — ECONOMIC STABILITY: HOUSING INSECURITY
IN THE LAST 12 MONTHS, WAS THERE A TIME WHEN YOU DID NOT HAVE A STEADY PLACE TO SLEEP OR SLEPT IN A SHELTER (INCLUDING NOW)?: NO

## 2023-05-17 SDOH — ECONOMIC STABILITY: FOOD INSECURITY: WITHIN THE PAST 12 MONTHS, YOU WORRIED THAT YOUR FOOD WOULD RUN OUT BEFORE YOU GOT MONEY TO BUY MORE.: NEVER TRUE

## 2023-05-17 ASSESSMENT — PATIENT HEALTH QUESTIONNAIRE - PHQ9
SUM OF ALL RESPONSES TO PHQ QUESTIONS 1-9: 0
5. POOR APPETITE OR OVEREATING: 0
4. FEELING TIRED OR HAVING LITTLE ENERGY: 0
6. FEELING BAD ABOUT YOURSELF - OR THAT YOU ARE A FAILURE OR HAVE LET YOURSELF OR YOUR FAMILY DOWN: 0
7. TROUBLE CONCENTRATING ON THINGS, SUCH AS READING THE NEWSPAPER OR WATCHING TELEVISION: 0
10. IF YOU CHECKED OFF ANY PROBLEMS, HOW DIFFICULT HAVE THESE PROBLEMS MADE IT FOR YOU TO DO YOUR WORK, TAKE CARE OF THINGS AT HOME, OR GET ALONG WITH OTHER PEOPLE: 0
8. MOVING OR SPEAKING SO SLOWLY THAT OTHER PEOPLE COULD HAVE NOTICED. OR THE OPPOSITE, BEING SO FIGETY OR RESTLESS THAT YOU HAVE BEEN MOVING AROUND A LOT MORE THAN USUAL: 0
3. TROUBLE FALLING OR STAYING ASLEEP: 0
1. LITTLE INTEREST OR PLEASURE IN DOING THINGS: 0
SUM OF ALL RESPONSES TO PHQ QUESTIONS 1-9: 0
2. FEELING DOWN, DEPRESSED OR HOPELESS: 0
SUM OF ALL RESPONSES TO PHQ QUESTIONS 1-9: 0
SUM OF ALL RESPONSES TO PHQ9 QUESTIONS 1 & 2: 0
9. THOUGHTS THAT YOU WOULD BE BETTER OFF DEAD, OR OF HURTING YOURSELF: 0
SUM OF ALL RESPONSES TO PHQ QUESTIONS 1-9: 0

## 2023-05-17 ASSESSMENT — ENCOUNTER SYMPTOMS
ABDOMINAL PAIN: 0
DIARRHEA: 0
VOMITING: 0
COUGH: 0
WHEEZING: 0
SHORTNESS OF BREATH: 0
TROUBLE SWALLOWING: 0
NAUSEA: 0
FACIAL SWELLING: 0
CHEST TIGHTNESS: 0
VOICE CHANGE: 0
BACK PAIN: 1
COLOR CHANGE: 0
RHINORRHEA: 0
SINUS PAIN: 0
CONSTIPATION: 0
SORE THROAT: 0
SINUS PRESSURE: 0

## 2023-05-17 NOTE — ASSESSMENT & PLAN NOTE
Well controlled on Cymbalta 60 mg daily. Depression screening negative today. Denies any adverse effects.

## 2023-05-17 NOTE — ASSESSMENT & PLAN NOTE
Monitored by specialist- no acute findings meriting change in the plan  Continue Plavix 75 mg daily, asymptomatic at this time.

## 2023-05-17 NOTE — PROGRESS NOTES
constipation, diarrhea, nausea and vomiting. Endocrine: Negative for cold intolerance, heat intolerance, polydipsia, polyphagia and polyuria. Genitourinary:  Negative for difficulty urinating, frequency and urgency. Musculoskeletal:  Positive for back pain (follows with pain management). Negative for arthralgias, gait problem, joint swelling, myalgias, neck pain and neck stiffness. Skin:  Negative for color change, pallor, rash and wound. Allergic/Immunologic: Negative for environmental allergies, food allergies and immunocompromised state. Neurological:  Negative for dizziness, tremors, seizures, syncope, facial asymmetry, speech difficulty, weakness, light-headedness, numbness and headaches. Hematological:  Negative for adenopathy. Does not bruise/bleed easily. Psychiatric/Behavioral:  Negative for agitation, behavioral problems, confusion, decreased concentration, dysphoric mood, hallucinations, self-injury, sleep disturbance and suicidal ideas. The patient is not nervous/anxious and is not hyperactive.       OBJECTIVE:     VS:  Wt Readings from Last 3 Encounters:   05/17/23 183 lb (83 kg)   05/03/23 182 lb (82.6 kg)   03/23/23 182 lb (82.6 kg)                       Vitals:    05/17/23 1253   BP: 128/76   Pulse: 83   Resp: 16   Temp: 97.3 °F (36.3 °C)   SpO2: 96%   Weight: 183 lb (83 kg)   Height: 5' 11\" (1.803 m)       General: Alert and oriented to person, place, and time, well developed and well nourished, in no acute distress  SKIN: Warm and dry, intact without any rash, masses or lesions  HEAD: normocephalic, atraumatic  ENT: tympanic membranes, external ear and ear canal normal bilaterally, normal hearing, Throat: clear, tongue midline,  no drainage, no masses or lesions noted, good dentition  Neck: supple and non-tender without mass, trachea midline, no cervical lymphadenopathy, no bruit, no thyromegaly or nodules  Cardiovascular: regular rate and regular rhythm, normal S1 and S2,   no

## 2023-05-17 NOTE — ASSESSMENT & PLAN NOTE
well controlled, no significant side effects from medication noted. Continue metoprolol succinate 50 mg BID, continue Plavix 75 mg per cardiology, fasting labs ordered. labs reviewed:CMP, CBCD, Lipids in epic 2022,   -Discussed taking medication as directed every day around the same time. Do not double up if skipped or missed doses. -Discussed exercising daily 30 minutes 5 times a week for 150 minutes weekly to help with stress reduction and weight reduction if needed.  -Discussed low sodium diet.  -Discussed limiting caffeine consumption and tobacco cessation and the effects they have on the heart and blood pressure.

## 2023-05-17 NOTE — ASSESSMENT & PLAN NOTE
well controlled and no significant medication side effects noted,continue atorvastatin 10 mg daily, fasting labs ordered. labs reviewed Lipid, CMP, CBCD, TSH, FT4 in epic 2022,   -Discussed low fat diet, limit fast food, goodies, breads and pastas if consuming several days a week,  limit alcohol consumption as this combined with statin can cause liver problems.  -Discussed weight reduction and exercise 30 minutes 5 days a week for total of 150 minutes weekly.  -Discussed CoQ10 as directed  -Discussed if any unusual muscle aching/pain to contact the office, discussed medication and risk of muscle pain/damage from Rhabdomyolysis.

## 2023-05-25 DIAGNOSIS — I10 BENIGN ESSENTIAL HYPERTENSION: ICD-10-CM

## 2023-05-25 DIAGNOSIS — Z95.5 STENTED CORONARY ARTERY: ICD-10-CM

## 2023-05-25 DIAGNOSIS — F41.8 ANXIETY WITH DEPRESSION: ICD-10-CM

## 2023-05-25 RX ORDER — METOPROLOL SUCCINATE 50 MG/1
TABLET, EXTENDED RELEASE ORAL
Qty: 180 TABLET | Refills: 1 | OUTPATIENT
Start: 2023-05-25

## 2023-05-25 RX ORDER — CLOPIDOGREL BISULFATE 75 MG/1
75 TABLET ORAL DAILY
Qty: 90 TABLET | Refills: 1 | OUTPATIENT
Start: 2023-05-25

## 2023-05-25 RX ORDER — DULOXETIN HYDROCHLORIDE 60 MG/1
60 CAPSULE, DELAYED RELEASE ORAL DAILY
Qty: 90 CAPSULE | Refills: 1 | OUTPATIENT
Start: 2023-05-25

## 2023-06-14 DIAGNOSIS — I10 BENIGN ESSENTIAL HYPERTENSION: ICD-10-CM

## 2023-06-14 DIAGNOSIS — E05.90 HYPERTHYROIDISM: ICD-10-CM

## 2023-06-14 DIAGNOSIS — E78.2 MIXED HYPERLIPIDEMIA: ICD-10-CM

## 2023-06-14 DIAGNOSIS — E05.00 GRAVES' DISEASE: ICD-10-CM

## 2023-06-14 LAB
T3FREE SERPL-MCNC: 3.1 PG/ML (ref 2–4.4)
T4 FREE SERPL-MCNC: 1.13 NG/DL (ref 0.93–1.7)
TSH SERPL-MCNC: 1.5 UIU/ML (ref 0.27–4.2)

## 2023-06-15 ENCOUNTER — TELEPHONE (OUTPATIENT)
Dept: ENDOCRINOLOGY | Age: 68
End: 2023-06-15

## 2023-06-15 DIAGNOSIS — E05.90 HYPERTHYROIDISM: Primary | ICD-10-CM

## 2023-06-17 LAB — TSI SER-ACNC: 1.64 IU/L

## 2023-06-19 ENCOUNTER — OFFICE VISIT (OUTPATIENT)
Dept: FAMILY MEDICINE CLINIC | Age: 68
End: 2023-06-19
Payer: MEDICARE

## 2023-06-19 VITALS
TEMPERATURE: 97.6 F | DIASTOLIC BLOOD PRESSURE: 72 MMHG | RESPIRATION RATE: 16 BRPM | HEIGHT: 71 IN | SYSTOLIC BLOOD PRESSURE: 126 MMHG | OXYGEN SATURATION: 93 % | BODY MASS INDEX: 25.28 KG/M2 | WEIGHT: 180.6 LBS | HEART RATE: 84 BPM

## 2023-06-19 DIAGNOSIS — Z13.6 SCREENING FOR CARDIOVASCULAR CONDITION: ICD-10-CM

## 2023-06-19 DIAGNOSIS — Z00.00 MEDICARE ANNUAL WELLNESS VISIT, SUBSEQUENT: Primary | ICD-10-CM

## 2023-06-19 PROCEDURE — 1123F ACP DISCUSS/DSCN MKR DOCD: CPT | Performed by: NURSE PRACTITIONER

## 2023-06-19 PROCEDURE — 3074F SYST BP LT 130 MM HG: CPT | Performed by: NURSE PRACTITIONER

## 2023-06-19 PROCEDURE — 3017F COLORECTAL CA SCREEN DOC REV: CPT | Performed by: NURSE PRACTITIONER

## 2023-06-19 PROCEDURE — 3078F DIAST BP <80 MM HG: CPT | Performed by: NURSE PRACTITIONER

## 2023-06-19 PROCEDURE — G0439 PPPS, SUBSEQ VISIT: HCPCS | Performed by: NURSE PRACTITIONER

## 2023-06-19 RX ORDER — METOPROLOL SUCCINATE 50 MG/1
50 TABLET, EXTENDED RELEASE ORAL 2 TIMES DAILY
Qty: 180 TABLET | Refills: 1 | Status: CANCELLED | OUTPATIENT
Start: 2023-06-19

## 2023-06-19 RX ORDER — DULOXETIN HYDROCHLORIDE 60 MG/1
60 CAPSULE, DELAYED RELEASE ORAL DAILY
Qty: 90 CAPSULE | Refills: 1 | Status: CANCELLED | OUTPATIENT
Start: 2023-06-19

## 2023-06-19 RX ORDER — ATORVASTATIN CALCIUM 10 MG/1
10 TABLET, FILM COATED ORAL DAILY
Qty: 90 TABLET | Refills: 1 | Status: CANCELLED | OUTPATIENT
Start: 2023-06-19

## 2023-06-19 RX ORDER — CLOPIDOGREL BISULFATE 75 MG/1
75 TABLET ORAL DAILY
Qty: 90 TABLET | Refills: 1 | Status: CANCELLED | OUTPATIENT
Start: 2023-06-19

## 2023-06-19 ASSESSMENT — PATIENT HEALTH QUESTIONNAIRE - PHQ9
4. FEELING TIRED OR HAVING LITTLE ENERGY: 0
SUM OF ALL RESPONSES TO PHQ QUESTIONS 1-9: 0
2. FEELING DOWN, DEPRESSED OR HOPELESS: 0
3. TROUBLE FALLING OR STAYING ASLEEP: 0
SUM OF ALL RESPONSES TO PHQ9 QUESTIONS 1 & 2: 0
1. LITTLE INTEREST OR PLEASURE IN DOING THINGS: 0
SUM OF ALL RESPONSES TO PHQ QUESTIONS 1-9: 0
8. MOVING OR SPEAKING SO SLOWLY THAT OTHER PEOPLE COULD HAVE NOTICED. OR THE OPPOSITE, BEING SO FIGETY OR RESTLESS THAT YOU HAVE BEEN MOVING AROUND A LOT MORE THAN USUAL: 0
7. TROUBLE CONCENTRATING ON THINGS, SUCH AS READING THE NEWSPAPER OR WATCHING TELEVISION: 0
6. FEELING BAD ABOUT YOURSELF - OR THAT YOU ARE A FAILURE OR HAVE LET YOURSELF OR YOUR FAMILY DOWN: 0
9. THOUGHTS THAT YOU WOULD BE BETTER OFF DEAD, OR OF HURTING YOURSELF: 0
5. POOR APPETITE OR OVEREATING: 0
10. IF YOU CHECKED OFF ANY PROBLEMS, HOW DIFFICULT HAVE THESE PROBLEMS MADE IT FOR YOU TO DO YOUR WORK, TAKE CARE OF THINGS AT HOME, OR GET ALONG WITH OTHER PEOPLE: 0
SUM OF ALL RESPONSES TO PHQ QUESTIONS 1-9: 0
SUM OF ALL RESPONSES TO PHQ QUESTIONS 1-9: 0

## 2023-06-19 ASSESSMENT — LIFESTYLE VARIABLES
HOW OFTEN DO YOU HAVE A DRINK CONTAINING ALCOHOL: NEVER
HOW MANY STANDARD DRINKS CONTAINING ALCOHOL DO YOU HAVE ON A TYPICAL DAY: PATIENT DOES NOT DRINK

## 2023-06-19 NOTE — PROGRESS NOTES
Counseled on the importance of adherence to annual lung cancer LDCT screening, impact of comorbidities, ability and willingness to undergo diagnosis and treatment. Counseled on the importance of maintaining cigarette smoking abstinence and cessation. The patient has a history of >20 pack years and is either still smoking or quit within the last 15 years. There are no signs or symptoms of lung cancer. CV Risk Counseling:  Patient was asked about her current diet and exercise habits, and personalized advice was provided regarding recommended lifestyle changes. Patient's individual cardiovascular disease risk factors, including advanced age (> 54 for men, > 72 for women), dyslipidemia, hypertension, and smoking/tobacco exposure, were discussed, as well as the likely benefits of lifestyle changes. Based upon patient's motivation to change her behavior, the following plan was agreed upon to work toward lowering cardiovascular disease risk: low saturated fat, low cholesterol diet and at least 150 minutes of exercise/week. Aspirin use for primary prevention of cardiovascular disease for men 45-79 and women 55-79: Contraindicated due to plavix . Educational materials for lifestyle changes were provided. Patient will follow-up in 6 month(s) with PCP. Provider spent 5 minutes counseling patient. Objective   Vitals:    06/19/23 1304   BP: 126/72   Pulse: 84   Resp: 16   Temp: 97.6 °F (36.4 °C)   SpO2: 93%   Weight: 180 lb 9.6 oz (81.9 kg)   Height: 5' 11\" (1.803 m)      Body mass index is 25.19 kg/m².     General Appearance: alert and oriented to person, place and time, well developed and well- nourished, in no acute distress  Skin: warm and dry, no rash or erythema  Head: normocephalic and atraumatic  Eyes: pupils equal, round, and reactive to light, extraocular eye movements intact, conjunctivae normal  ENT: tympanic membrane, external ear and ear canal normal bilaterally, nose without deformity, nasal

## 2023-07-24 ENCOUNTER — TELEPHONE (OUTPATIENT)
Dept: PAIN MANAGEMENT | Age: 68
End: 2023-07-24

## 2023-07-24 DIAGNOSIS — G71.3 MITOCHONDRIAL MYOPATHY: ICD-10-CM

## 2023-07-24 RX ORDER — GABAPENTIN 300 MG/1
300 CAPSULE ORAL 3 TIMES DAILY
Qty: 90 CAPSULE | Refills: 0 | Status: SHIPPED
Start: 2023-07-24 | End: 2023-07-26 | Stop reason: SDUPTHER

## 2023-07-24 NOTE — TELEPHONE ENCOUNTER
Marcus Minaya is requesting a refill of her Gabapentin. OARRS is consistent. Last visit was 6/14/23, next appt scheduled 7/26/23.   Thank you

## 2023-07-26 ENCOUNTER — OFFICE VISIT (OUTPATIENT)
Dept: FAMILY MEDICINE CLINIC | Age: 68
End: 2023-07-26
Payer: MEDICARE

## 2023-07-26 ENCOUNTER — OFFICE VISIT (OUTPATIENT)
Dept: PAIN MANAGEMENT | Age: 68
End: 2023-07-26
Payer: MEDICARE

## 2023-07-26 VITALS
HEART RATE: 94 BPM | SYSTOLIC BLOOD PRESSURE: 180 MMHG | HEIGHT: 71 IN | TEMPERATURE: 97.1 F | DIASTOLIC BLOOD PRESSURE: 100 MMHG | BODY MASS INDEX: 25.2 KG/M2 | WEIGHT: 180 LBS | OXYGEN SATURATION: 96 % | RESPIRATION RATE: 18 BRPM

## 2023-07-26 VITALS
BODY MASS INDEX: 25.2 KG/M2 | WEIGHT: 180 LBS | DIASTOLIC BLOOD PRESSURE: 76 MMHG | HEIGHT: 71 IN | OXYGEN SATURATION: 99 % | RESPIRATION RATE: 17 BRPM | TEMPERATURE: 97.6 F | HEART RATE: 83 BPM | SYSTOLIC BLOOD PRESSURE: 138 MMHG

## 2023-07-26 DIAGNOSIS — M51.9 LUMBAR DISC DISORDER: ICD-10-CM

## 2023-07-26 DIAGNOSIS — M54.16 LUMBAR RADICULOPATHY: ICD-10-CM

## 2023-07-26 DIAGNOSIS — M47.816 LUMBAR FACET ARTHROPATHY: ICD-10-CM

## 2023-07-26 DIAGNOSIS — M54.50 CHRONIC BILATERAL LOW BACK PAIN WITHOUT SCIATICA: ICD-10-CM

## 2023-07-26 DIAGNOSIS — M47.816 LUMBAR SPONDYLOSIS: Primary | ICD-10-CM

## 2023-07-26 DIAGNOSIS — M16.11 PRIMARY OSTEOARTHRITIS OF RIGHT HIP: ICD-10-CM

## 2023-07-26 DIAGNOSIS — F11.20 OPIOID DEPENDENCE WITH CURRENT USE (HCC): ICD-10-CM

## 2023-07-26 DIAGNOSIS — G89.4 CHRONIC PAIN SYNDROME: ICD-10-CM

## 2023-07-26 DIAGNOSIS — G89.29 CHRONIC BILATERAL LOW BACK PAIN WITHOUT SCIATICA: ICD-10-CM

## 2023-07-26 DIAGNOSIS — G71.3 MITOCHONDRIAL MYOPATHY: ICD-10-CM

## 2023-07-26 DIAGNOSIS — R03.0 ELEVATED BLOOD PRESSURE READING: Primary | ICD-10-CM

## 2023-07-26 PROCEDURE — G8399 PT W/DXA RESULTS DOCUMENT: HCPCS

## 2023-07-26 PROCEDURE — 99213 OFFICE O/P EST LOW 20 MIN: CPT | Performed by: PHYSICIAN ASSISTANT

## 2023-07-26 PROCEDURE — G8417 CALC BMI ABV UP PARAM F/U: HCPCS | Performed by: PHYSICIAN ASSISTANT

## 2023-07-26 PROCEDURE — 3077F SYST BP >= 140 MM HG: CPT | Performed by: PHYSICIAN ASSISTANT

## 2023-07-26 PROCEDURE — 1090F PRES/ABSN URINE INCON ASSESS: CPT

## 2023-07-26 PROCEDURE — 1123F ACP DISCUSS/DSCN MKR DOCD: CPT

## 2023-07-26 PROCEDURE — 3080F DIAST BP >= 90 MM HG: CPT | Performed by: PHYSICIAN ASSISTANT

## 2023-07-26 PROCEDURE — 1036F TOBACCO NON-USER: CPT | Performed by: PHYSICIAN ASSISTANT

## 2023-07-26 PROCEDURE — 3075F SYST BP GE 130 - 139MM HG: CPT

## 2023-07-26 PROCEDURE — 3078F DIAST BP <80 MM HG: CPT

## 2023-07-26 PROCEDURE — G8417 CALC BMI ABV UP PARAM F/U: HCPCS

## 2023-07-26 PROCEDURE — 1123F ACP DISCUSS/DSCN MKR DOCD: CPT | Performed by: PHYSICIAN ASSISTANT

## 2023-07-26 PROCEDURE — 1090F PRES/ABSN URINE INCON ASSESS: CPT | Performed by: PHYSICIAN ASSISTANT

## 2023-07-26 PROCEDURE — 3017F COLORECTAL CA SCREEN DOC REV: CPT

## 2023-07-26 PROCEDURE — 99213 OFFICE O/P EST LOW 20 MIN: CPT

## 2023-07-26 PROCEDURE — 1036F TOBACCO NON-USER: CPT

## 2023-07-26 PROCEDURE — G8427 DOCREV CUR MEDS BY ELIG CLIN: HCPCS | Performed by: PHYSICIAN ASSISTANT

## 2023-07-26 PROCEDURE — 3017F COLORECTAL CA SCREEN DOC REV: CPT | Performed by: PHYSICIAN ASSISTANT

## 2023-07-26 PROCEDURE — G8428 CUR MEDS NOT DOCUMENT: HCPCS

## 2023-07-26 PROCEDURE — G8399 PT W/DXA RESULTS DOCUMENT: HCPCS | Performed by: PHYSICIAN ASSISTANT

## 2023-07-26 RX ORDER — GABAPENTIN 300 MG/1
300 CAPSULE ORAL 3 TIMES DAILY
Qty: 90 CAPSULE | Refills: 1 | Status: SHIPPED | OUTPATIENT
Start: 2023-07-26 | End: 2023-10-24

## 2023-07-26 RX ORDER — CYCLOBENZAPRINE HCL 10 MG
10 TABLET ORAL DAILY
Qty: 30 TABLET | Refills: 0 | Status: SHIPPED | OUTPATIENT
Start: 2023-07-26

## 2023-07-26 RX ORDER — LIDOCAINE 50 MG/G
PATCH TOPICAL
Qty: 90 PATCH | Refills: 5 | Status: SHIPPED | OUTPATIENT
Start: 2023-07-26

## 2023-07-26 RX ORDER — HYDROCODONE BITARTRATE AND ACETAMINOPHEN 7.5; 325 MG/1; MG/1
0.5 TABLET ORAL EVERY 8 HOURS PRN
Qty: 45 TABLET | Refills: 0 | Status: SHIPPED | OUTPATIENT
Start: 2023-07-26 | End: 2023-08-25

## 2023-07-26 NOTE — PROGRESS NOTES
turgor. Warm, dry, without visible rash, unless noted elsewhere. Neurological:  Alert and oriented. Motor functions intact. Lab / Imaging Results   (All laboratory and radiology results have been personally reviewed by myself)  Labs:  No results found for this visit on 07/26/23. Imaging: All Radiology results interpreted by Radiologist unless otherwise noted. Assessment / Plan     Impression(s):  Fauzia Alvarado was seen today for hypertension. Diagnoses and all orders for this visit:    Elevated blood pressure reading      Disposition:  Disposition: Discharge to Home. Blood pressure is 136/76 in office today. Advised to continue current medication regimen as prescribed. F/u with PCP in 2 to 3 days for recheck. Advised to continue self-monitoring blood pressure at home in the interim and keep a written log to take to next appt. ED with any readings greater than 200/110, nausea, vomiting, fever, chills, abdominal pain, CP, SOB, palpitations, edema, dizziness, or severe HA. Pt states understanding and is in agreement with this care plan. All questions answered. SEFERINO Mathew - CNP    **This report was transcribed using voice recognition software. Every effort was made to ensure accuracy; however, inadvertent computerized transcription errors may be present.

## 2023-09-06 ENCOUNTER — OFFICE VISIT (OUTPATIENT)
Dept: PAIN MANAGEMENT | Age: 68
End: 2023-09-06
Payer: MEDICARE

## 2023-09-06 VITALS
WEIGHT: 180 LBS | HEIGHT: 71 IN | SYSTOLIC BLOOD PRESSURE: 122 MMHG | DIASTOLIC BLOOD PRESSURE: 74 MMHG | BODY MASS INDEX: 25.2 KG/M2 | RESPIRATION RATE: 18 BRPM

## 2023-09-06 DIAGNOSIS — M51.9 LUMBAR DISC DISORDER: ICD-10-CM

## 2023-09-06 DIAGNOSIS — M47.816 LUMBAR FACET ARTHROPATHY: ICD-10-CM

## 2023-09-06 DIAGNOSIS — M54.16 LUMBAR RADICULOPATHY: ICD-10-CM

## 2023-09-06 DIAGNOSIS — M47.816 LUMBAR SPONDYLOSIS: Primary | ICD-10-CM

## 2023-09-06 DIAGNOSIS — G89.4 CHRONIC PAIN SYNDROME: ICD-10-CM

## 2023-09-06 DIAGNOSIS — M16.11 PRIMARY OSTEOARTHRITIS OF RIGHT HIP: ICD-10-CM

## 2023-09-06 DIAGNOSIS — F11.20 OPIOID DEPENDENCE WITH CURRENT USE (HCC): ICD-10-CM

## 2023-09-06 DIAGNOSIS — Z79.891 ENCOUNTER FOR LONG-TERM OPIATE ANALGESIC USE: ICD-10-CM

## 2023-09-06 PROCEDURE — G8417 CALC BMI ABV UP PARAM F/U: HCPCS | Performed by: PHYSICIAN ASSISTANT

## 2023-09-06 PROCEDURE — 3078F DIAST BP <80 MM HG: CPT | Performed by: PHYSICIAN ASSISTANT

## 2023-09-06 PROCEDURE — 1036F TOBACCO NON-USER: CPT | Performed by: PHYSICIAN ASSISTANT

## 2023-09-06 PROCEDURE — 3074F SYST BP LT 130 MM HG: CPT | Performed by: PHYSICIAN ASSISTANT

## 2023-09-06 PROCEDURE — 1090F PRES/ABSN URINE INCON ASSESS: CPT | Performed by: PHYSICIAN ASSISTANT

## 2023-09-06 PROCEDURE — 1123F ACP DISCUSS/DSCN MKR DOCD: CPT | Performed by: PHYSICIAN ASSISTANT

## 2023-09-06 PROCEDURE — G8399 PT W/DXA RESULTS DOCUMENT: HCPCS | Performed by: PHYSICIAN ASSISTANT

## 2023-09-06 PROCEDURE — 99213 OFFICE O/P EST LOW 20 MIN: CPT | Performed by: PHYSICIAN ASSISTANT

## 2023-09-06 PROCEDURE — 3017F COLORECTAL CA SCREEN DOC REV: CPT | Performed by: PHYSICIAN ASSISTANT

## 2023-09-06 PROCEDURE — G8427 DOCREV CUR MEDS BY ELIG CLIN: HCPCS | Performed by: PHYSICIAN ASSISTANT

## 2023-09-06 RX ORDER — OXYCODONE HYDROCHLORIDE AND ACETAMINOPHEN 5; 325 MG/1; MG/1
1 TABLET ORAL DAILY PRN
Qty: 7 TABLET | Refills: 0 | Status: SHIPPED | OUTPATIENT
Start: 2023-09-06 | End: 2023-09-13

## 2023-09-06 NOTE — PROGRESS NOTES
[address], [address] [place] [address]  [phone]    Follow up Note      Cricket Vinson     Date of Visit:  9/6/2023    CC:  Patient presents for follow up   Chief Complaint   Patient presents with    Back Pain       HPI:    Pain is worse recently. Appropriate analgesia with current medications regimen: yes. Change in quality of symptoms:no. Medication side effects:none. Recent diagnostic testing:none. Recent interventional procedures:none. .    She has been on anticoagulation medications to include Plavix/ASA. The patient  has not been on herbal supplements. The patient is not diabetic. Imaging:   Lumbar spine MRI 2021  . Advanced degenerative change with slight grade 1 retrolisthesis at L2-3   and slight grade 1 anterolisthesis at L4-5. Mild levoscoliosis. 2. Moderate central canal stenosis at L3-4 with right-sided disc protrusion   causing mild right subarticular recess stenosis and moderate right neural   foraminal stenosis. 3. Mild to moderate central canal stenosis at L2-3 with small right-sided   disc herniation causing mild right subarticular recess and neural foraminal   stenosis. 4. Small left-sided disc protrusion at L4-5 causing mild left subarticular   recess stenosis and neural foraminal stenosis. No significant central canal   stenosis at this level. 5. Subchondral signal change about L2-3 with some some edema most likely   related to severe degenerative disc disease. Clinical correlation for signs   of infection is suggested. Right hip Xray 2021:  Mild-to-moderate osteoarthritis at the right hip. Lumbar spine Xray 2016:  1. Left convex scoliosis. 2. Multilevel degenerative spondylosis throughout the lumbar spine. 3. Facet arthritis from L2-S1.   4. No acute abnormality is identified. Previous treatments: Physical Therapy and medications. .                            Potential Aberrant Drug-Related

## 2023-09-12 DIAGNOSIS — G71.3 MITOCHONDRIAL MYOPATHY: ICD-10-CM

## 2023-09-12 RX ORDER — CYCLOBENZAPRINE HCL 10 MG
10 TABLET ORAL DAILY
Qty: 30 TABLET | Refills: 0 | OUTPATIENT
Start: 2023-09-12

## 2023-09-14 ENCOUNTER — TELEPHONE (OUTPATIENT)
Dept: PAIN MANAGEMENT | Age: 68
End: 2023-09-14

## 2023-09-14 DIAGNOSIS — M54.16 LUMBAR RADICULOPATHY: ICD-10-CM

## 2023-09-14 DIAGNOSIS — M16.11 PRIMARY OSTEOARTHRITIS OF RIGHT HIP: ICD-10-CM

## 2023-09-14 DIAGNOSIS — F11.20 OPIOID DEPENDENCE WITH CURRENT USE (HCC): ICD-10-CM

## 2023-09-14 DIAGNOSIS — M51.9 LUMBAR DISC DISORDER: ICD-10-CM

## 2023-09-14 DIAGNOSIS — M47.816 LUMBAR SPONDYLOSIS: ICD-10-CM

## 2023-09-14 DIAGNOSIS — G89.4 CHRONIC PAIN SYNDROME: ICD-10-CM

## 2023-09-14 DIAGNOSIS — M47.816 LUMBAR FACET ARTHROPATHY: ICD-10-CM

## 2023-09-14 RX ORDER — OXYCODONE HYDROCHLORIDE AND ACETAMINOPHEN 5; 325 MG/1; MG/1
1 TABLET ORAL DAILY PRN
Qty: 30 TABLET | Refills: 0 | Status: SHIPPED
Start: 2023-09-14 | End: 2023-10-18 | Stop reason: SDUPTHER

## 2023-09-14 NOTE — TELEPHONE ENCOUNTER
Rosi Maribeth is requesting a refill of Percocet stating that it has been effective in treating her pain. Last filled 9/6. OARRS is consistent. Last appt 9/6. Next appt 10/18.  thanks

## 2023-10-18 ENCOUNTER — OFFICE VISIT (OUTPATIENT)
Dept: PAIN MANAGEMENT | Age: 68
End: 2023-10-18
Payer: MEDICARE

## 2023-10-18 VITALS
SYSTOLIC BLOOD PRESSURE: 130 MMHG | DIASTOLIC BLOOD PRESSURE: 70 MMHG | RESPIRATION RATE: 18 BRPM | WEIGHT: 179.9 LBS | OXYGEN SATURATION: 95 % | HEIGHT: 71 IN | BODY MASS INDEX: 25.19 KG/M2 | TEMPERATURE: 97.5 F | HEART RATE: 81 BPM

## 2023-10-18 DIAGNOSIS — G71.3 MITOCHONDRIAL MYOPATHY: ICD-10-CM

## 2023-10-18 DIAGNOSIS — M54.16 LUMBAR RADICULOPATHY: ICD-10-CM

## 2023-10-18 DIAGNOSIS — M16.11 PRIMARY OSTEOARTHRITIS OF RIGHT HIP: ICD-10-CM

## 2023-10-18 DIAGNOSIS — G89.4 CHRONIC PAIN SYNDROME: ICD-10-CM

## 2023-10-18 DIAGNOSIS — M51.9 LUMBAR DISC DISORDER: ICD-10-CM

## 2023-10-18 DIAGNOSIS — Z79.891 ENCOUNTER FOR LONG-TERM OPIATE ANALGESIC USE: ICD-10-CM

## 2023-10-18 DIAGNOSIS — F11.20 OPIOID DEPENDENCE WITH CURRENT USE (HCC): ICD-10-CM

## 2023-10-18 DIAGNOSIS — M54.50 CHRONIC BILATERAL LOW BACK PAIN WITHOUT SCIATICA: ICD-10-CM

## 2023-10-18 DIAGNOSIS — M47.816 LUMBAR FACET ARTHROPATHY: ICD-10-CM

## 2023-10-18 DIAGNOSIS — M47.816 LUMBAR SPONDYLOSIS: Primary | ICD-10-CM

## 2023-10-18 DIAGNOSIS — G89.29 CHRONIC BILATERAL LOW BACK PAIN WITHOUT SCIATICA: ICD-10-CM

## 2023-10-18 PROCEDURE — 99213 OFFICE O/P EST LOW 20 MIN: CPT | Performed by: PHYSICIAN ASSISTANT

## 2023-10-18 PROCEDURE — G8417 CALC BMI ABV UP PARAM F/U: HCPCS | Performed by: PHYSICIAN ASSISTANT

## 2023-10-18 PROCEDURE — G8484 FLU IMMUNIZE NO ADMIN: HCPCS | Performed by: PHYSICIAN ASSISTANT

## 2023-10-18 PROCEDURE — 3075F SYST BP GE 130 - 139MM HG: CPT | Performed by: PHYSICIAN ASSISTANT

## 2023-10-18 PROCEDURE — G8399 PT W/DXA RESULTS DOCUMENT: HCPCS | Performed by: PHYSICIAN ASSISTANT

## 2023-10-18 PROCEDURE — 3078F DIAST BP <80 MM HG: CPT | Performed by: PHYSICIAN ASSISTANT

## 2023-10-18 PROCEDURE — G8427 DOCREV CUR MEDS BY ELIG CLIN: HCPCS | Performed by: PHYSICIAN ASSISTANT

## 2023-10-18 PROCEDURE — 3017F COLORECTAL CA SCREEN DOC REV: CPT | Performed by: PHYSICIAN ASSISTANT

## 2023-10-18 PROCEDURE — 1036F TOBACCO NON-USER: CPT | Performed by: PHYSICIAN ASSISTANT

## 2023-10-18 PROCEDURE — 1123F ACP DISCUSS/DSCN MKR DOCD: CPT | Performed by: PHYSICIAN ASSISTANT

## 2023-10-18 PROCEDURE — 1090F PRES/ABSN URINE INCON ASSESS: CPT | Performed by: PHYSICIAN ASSISTANT

## 2023-10-18 RX ORDER — OXYCODONE HYDROCHLORIDE AND ACETAMINOPHEN 5; 325 MG/1; MG/1
0.5 TABLET ORAL 3 TIMES DAILY PRN
Qty: 45 TABLET | Refills: 0 | Status: SHIPPED | OUTPATIENT
Start: 2023-10-18 | End: 2023-11-17

## 2023-10-18 RX ORDER — GABAPENTIN 300 MG/1
300 CAPSULE ORAL 3 TIMES DAILY
Qty: 90 CAPSULE | Refills: 1 | Status: SHIPPED | OUTPATIENT
Start: 2023-10-18 | End: 2024-01-16

## 2023-10-18 RX ORDER — CYCLOBENZAPRINE HCL 10 MG
10 TABLET ORAL DAILY
Qty: 30 TABLET | Refills: 0 | Status: SHIPPED | OUTPATIENT
Start: 2023-10-18

## 2023-10-23 ENCOUNTER — OFFICE VISIT (OUTPATIENT)
Dept: FAMILY MEDICINE CLINIC | Age: 68
End: 2023-10-23
Payer: MEDICARE

## 2023-10-23 VITALS
WEIGHT: 168 LBS | RESPIRATION RATE: 16 BRPM | SYSTOLIC BLOOD PRESSURE: 120 MMHG | HEIGHT: 71 IN | BODY MASS INDEX: 23.52 KG/M2 | DIASTOLIC BLOOD PRESSURE: 66 MMHG | TEMPERATURE: 97.4 F | HEART RATE: 84 BPM | OXYGEN SATURATION: 97 %

## 2023-10-23 DIAGNOSIS — I10 BENIGN ESSENTIAL HYPERTENSION: Primary | ICD-10-CM

## 2023-10-23 DIAGNOSIS — Z23 NEED FOR PROPHYLACTIC VACCINATION AGAINST DIPHTHERIA AND TETANUS: ICD-10-CM

## 2023-10-23 DIAGNOSIS — E78.2 MIXED HYPERLIPIDEMIA: ICD-10-CM

## 2023-10-23 DIAGNOSIS — E05.90 HYPERTHYROIDISM: ICD-10-CM

## 2023-10-23 DIAGNOSIS — Z53.20 LUNG CANCER SCREENING DECLINED BY PATIENT: ICD-10-CM

## 2023-10-23 DIAGNOSIS — Z23 NEED FOR SHINGLES VACCINE: ICD-10-CM

## 2023-10-23 DIAGNOSIS — Z23 NEEDS FLU SHOT: ICD-10-CM

## 2023-10-23 DIAGNOSIS — Z95.5 STENTED CORONARY ARTERY: ICD-10-CM

## 2023-10-23 DIAGNOSIS — F41.8 ANXIETY WITH DEPRESSION: ICD-10-CM

## 2023-10-23 PROCEDURE — 1036F TOBACCO NON-USER: CPT | Performed by: NURSE PRACTITIONER

## 2023-10-23 PROCEDURE — G8420 CALC BMI NORM PARAMETERS: HCPCS | Performed by: NURSE PRACTITIONER

## 2023-10-23 PROCEDURE — 3078F DIAST BP <80 MM HG: CPT | Performed by: NURSE PRACTITIONER

## 2023-10-23 PROCEDURE — 1123F ACP DISCUSS/DSCN MKR DOCD: CPT | Performed by: NURSE PRACTITIONER

## 2023-10-23 PROCEDURE — G8399 PT W/DXA RESULTS DOCUMENT: HCPCS | Performed by: NURSE PRACTITIONER

## 2023-10-23 PROCEDURE — G0008 ADMIN INFLUENZA VIRUS VAC: HCPCS | Performed by: NURSE PRACTITIONER

## 2023-10-23 PROCEDURE — G8484 FLU IMMUNIZE NO ADMIN: HCPCS | Performed by: NURSE PRACTITIONER

## 2023-10-23 PROCEDURE — G8427 DOCREV CUR MEDS BY ELIG CLIN: HCPCS | Performed by: NURSE PRACTITIONER

## 2023-10-23 PROCEDURE — 3074F SYST BP LT 130 MM HG: CPT | Performed by: NURSE PRACTITIONER

## 2023-10-23 PROCEDURE — 90694 VACC AIIV4 NO PRSRV 0.5ML IM: CPT | Performed by: NURSE PRACTITIONER

## 2023-10-23 PROCEDURE — 99214 OFFICE O/P EST MOD 30 MIN: CPT | Performed by: NURSE PRACTITIONER

## 2023-10-23 PROCEDURE — 1090F PRES/ABSN URINE INCON ASSESS: CPT | Performed by: NURSE PRACTITIONER

## 2023-10-23 PROCEDURE — 3017F COLORECTAL CA SCREEN DOC REV: CPT | Performed by: NURSE PRACTITIONER

## 2023-10-23 RX ORDER — CLINDAMYCIN HYDROCHLORIDE 150 MG/1
CAPSULE ORAL
COMMUNITY
Start: 2023-09-11

## 2023-10-23 RX ORDER — PENICILLIN V POTASSIUM 500 MG/1
500 TABLET ORAL 4 TIMES DAILY
COMMUNITY
Start: 2023-09-09

## 2023-10-23 RX ORDER — ZOSTER VACCINE RECOMBINANT, ADJUVANTED 50 MCG/0.5
0.5 KIT INTRAMUSCULAR SEE ADMIN INSTRUCTIONS
Qty: 0.5 ML | Refills: 1 | Status: SHIPPED | OUTPATIENT
Start: 2023-10-23 | End: 2024-04-20

## 2023-10-23 ASSESSMENT — ENCOUNTER SYMPTOMS
WHEEZING: 0
COUGH: 0
BACK PAIN: 1
SHORTNESS OF BREATH: 0

## 2023-10-23 NOTE — ASSESSMENT & PLAN NOTE
Discussed with the patient the current USPSTF guidelines released March 9, 2021 for screening for lung cancer. For adults aged 48 to 80 years who have a 20 pack-year smoking history and currently smoke or have quit within the past 15 years the grade B recommendation is to:  Screen for lung cancer with low-dose computed tomography (LDCT) every year. Stop screening once a person has not smoked for 15 years or has a health problem that limits life expectancy or the ability to have lung surgery. The patient  reports that she quit smoking about 12 years ago. Her smoking use included cigarettes. She has a 27.00 pack-year smoking history. She has been exposed to tobacco smoke. She has never used smokeless tobacco.. Discussed with patient the risks and benefits of screening, including over-diagnosis, false positive rate, and total radiation exposure. The patient currently exhibits no signs or symptoms suggestive of lung cancer. Discussed with patient the importance of compliance with yearly annual lung cancer screenings and willingness to undergo diagnosis and treatment if screening scan is positive. In addition, the patient was counseled regarding the importance of remaining smoke free and/or total smoking cessation.     Also reviewed the following if the patient has Medicare that as of February 10, 2022, Medicare only covers LDCT screening in patients aged 53-69 with at least a 20 pack-year smoking history who currently smoke or have quit in the last 15 years

## 2023-10-23 NOTE — PROGRESS NOTES
OFFICE PROGRESS NOTE  [facility]  [address] [facility] [address]  Dept: [phone]   Chief Complaint   Patient presents with    Hypertension     4 month follow up     Hyperlipidemia       ASSESSMENT/PLAN   1. Benign essential hypertension  Assessment & Plan:   well controlled, no significant side effects from medication noted. Continue metoprolol XL 50 mg BID labs reviewed:CMP, CBCD, Lipids, TSH, FT4,   -Discussed taking medication as directed every day around the same time. Do not double up if skipped or missed doses. -Discussed exercising daily 30 minutes 5 times a week for 150 minutes weekly to help with stress reduction and weight reduction if needed.  -Discussed low sodium diet.  -Discussed limiting caffeine consumption and tobacco cessation and the effects they have on the heart and blood pressure. Orders:  -     metoprolol succinate (TOPROL XL) 50 MG extended release tablet; Take 1 tablet by mouth in the morning and 1 tablet in the evening., Disp-180 tablet, R-1Normal  -     CBC with Auto Differential; Future  2. Mixed hyperlipidemia  Assessment & Plan:  well controlled and no significant medication side effects noted, Continue atorvastatin 10 mg nightly, labs reviewed Lipid, CMP, CBCD, TSH, FT4, lab stable  -Discussed low fat diet, limit fast food, goodies, breads and pastas if consuming several days a week,  limit alcohol consumption as this combined with statin can cause liver problems.  -Discussed weight reduction and exercise 30 minutes 5 days a week for total of 150 minutes weekly.  -Discussed CoQ10 as directed  -Discussed if any unusual muscle aching/pain to contact the office, discussed medication and risk of muscle pain/damage from Rhabdomyolysis. Orders:  -     atorvastatin (LIPITOR) 10 MG tablet; Take 1 tablet by mouth daily, Disp-90 tablet, R-1Normal  3.  Anxiety with depression  -     DULoxetine (CYMBALTA) 60 MG extended release

## 2023-10-23 NOTE — ASSESSMENT & PLAN NOTE
well controlled, no significant side effects from medication noted. Continue metoprolol XL 50 mg BID labs reviewed:CMP, CBCD, Lipids, TSH, FT4,   -Discussed taking medication as directed every day around the same time. Do not double up if skipped or missed doses. -Discussed exercising daily 30 minutes 5 times a week for 150 minutes weekly to help with stress reduction and weight reduction if needed.  -Discussed low sodium diet.  -Discussed limiting caffeine consumption and tobacco cessation and the effects they have on the heart and blood pressure.

## 2023-10-25 RX ORDER — ATORVASTATIN CALCIUM 10 MG/1
10 TABLET, FILM COATED ORAL DAILY
Qty: 90 TABLET | Refills: 1 | Status: SHIPPED | OUTPATIENT
Start: 2023-10-25

## 2023-10-25 RX ORDER — METOPROLOL SUCCINATE 50 MG/1
50 TABLET, EXTENDED RELEASE ORAL
Qty: 180 TABLET | Refills: 1 | Status: SHIPPED | OUTPATIENT
Start: 2023-10-25

## 2023-10-25 RX ORDER — DULOXETIN HYDROCHLORIDE 60 MG/1
60 CAPSULE, DELAYED RELEASE ORAL DAILY
Qty: 90 CAPSULE | Refills: 1 | Status: SHIPPED | OUTPATIENT
Start: 2023-10-25

## 2023-10-25 RX ORDER — CLOPIDOGREL BISULFATE 75 MG/1
75 TABLET ORAL DAILY
Qty: 90 TABLET | Refills: 1 | Status: SHIPPED | OUTPATIENT
Start: 2023-10-25

## 2023-10-25 NOTE — ASSESSMENT & PLAN NOTE
well controlled and no significant medication side effects noted, Continue atorvastatin 10 mg nightly, labs reviewed Lipid, CMP, CBCD, TSH, FT4, lab stable  -Discussed low fat diet, limit fast food, goodies, breads and pastas if consuming several days a week,  limit alcohol consumption as this combined with statin can cause liver problems.  -Discussed weight reduction and exercise 30 minutes 5 days a week for total of 150 minutes weekly.  -Discussed CoQ10 as directed  -Discussed if any unusual muscle aching/pain to contact the office, discussed medication and risk of muscle pain/damage from Rhabdomyolysis.

## 2023-11-11 DIAGNOSIS — G71.3 MITOCHONDRIAL MYOPATHY: ICD-10-CM

## 2023-11-15 ENCOUNTER — OFFICE VISIT (OUTPATIENT)
Dept: ENDOCRINOLOGY | Age: 68
End: 2023-11-15
Payer: MEDICARE

## 2023-11-15 VITALS
SYSTOLIC BLOOD PRESSURE: 116 MMHG | BODY MASS INDEX: 23.52 KG/M2 | WEIGHT: 168 LBS | HEIGHT: 71 IN | DIASTOLIC BLOOD PRESSURE: 73 MMHG | HEART RATE: 83 BPM

## 2023-11-15 DIAGNOSIS — E55.9 VITAMIN D DEFICIENCY: ICD-10-CM

## 2023-11-15 DIAGNOSIS — E05.90 HYPERTHYROIDISM: Primary | ICD-10-CM

## 2023-11-15 DIAGNOSIS — E05.00 GRAVES' DISEASE: ICD-10-CM

## 2023-11-15 PROCEDURE — 1090F PRES/ABSN URINE INCON ASSESS: CPT | Performed by: CLINICAL NURSE SPECIALIST

## 2023-11-15 PROCEDURE — 1036F TOBACCO NON-USER: CPT | Performed by: CLINICAL NURSE SPECIALIST

## 2023-11-15 PROCEDURE — 99214 OFFICE O/P EST MOD 30 MIN: CPT | Performed by: CLINICAL NURSE SPECIALIST

## 2023-11-15 PROCEDURE — 3017F COLORECTAL CA SCREEN DOC REV: CPT | Performed by: CLINICAL NURSE SPECIALIST

## 2023-11-15 PROCEDURE — G8427 DOCREV CUR MEDS BY ELIG CLIN: HCPCS | Performed by: CLINICAL NURSE SPECIALIST

## 2023-11-15 PROCEDURE — G8484 FLU IMMUNIZE NO ADMIN: HCPCS | Performed by: CLINICAL NURSE SPECIALIST

## 2023-11-15 PROCEDURE — 3078F DIAST BP <80 MM HG: CPT | Performed by: CLINICAL NURSE SPECIALIST

## 2023-11-15 PROCEDURE — G8399 PT W/DXA RESULTS DOCUMENT: HCPCS | Performed by: CLINICAL NURSE SPECIALIST

## 2023-11-15 PROCEDURE — 1123F ACP DISCUSS/DSCN MKR DOCD: CPT | Performed by: CLINICAL NURSE SPECIALIST

## 2023-11-15 PROCEDURE — G8420 CALC BMI NORM PARAMETERS: HCPCS | Performed by: CLINICAL NURSE SPECIALIST

## 2023-11-15 PROCEDURE — 3074F SYST BP LT 130 MM HG: CPT | Performed by: CLINICAL NURSE SPECIALIST

## 2023-11-15 NOTE — PROGRESS NOTES
[address] [facility] Department of Endocrinology Diabetes and Metabolism   [address] [facility] [address] [place] [address], [id number]   Phone: [phone]  Fax: [phone]    Date of Service: 11/15/2023  Primary Care Physician: ESFERINO Cameron CNP  Referring physician: No ref. provider found  Provider: SEFERINO Vickers      Reason for the visit:  Hyperthyroidism    History of Present Illness: The history is provided by the patient. No  was used. Accuracy of the patient data is excellent. Will Hylton is a very pleasant 77 y.o. female seen today for evaluation and management of hyperthyroidism     Will Hylton was diagnosed with hyperthyroidism in 2/2021 and currently on methimazole 2.5 mg daily     Lab Results   Component Value Date    TSH 2.11 10/24/2023    A3IZMWR 231.40 (H) 09/08/2022    F7VSRBQ 11.8 (H) 09/08/2022    FT3 3.1 06/14/2023    T4FREE 1.2 10/24/2023       Lab Results   Component Value Date/Time    TSH 2.11 10/24/2023 09:25 AM    T4FREE 1.2 10/24/2023 09:25 AM    A3BKSUY 11.8 (H) 09/08/2022 11:59 AM    FT3 3.1 06/14/2023 09:47 AM    FT3 2.8 03/09/2023 10:46 AM    FT3 3.2 12/19/2022 01:54 PM    FT3 10.1 (H) 06/17/2022 12:37 PM    F9BZOQD 231.40 (H) 09/08/2022 11:59 AM    TSI 1.64 (H) 06/14/2023 09:48 AM    TPOABS 11.0 08/31/2022 11:31 AM       Thyroid uptake and scan 9/2021 showed 6-hour uptake to be 27% and 24-hour uptake to be 42% consistent with Graves' disease      Patient denied any history of  palpitations, swelling in the area of the thyroid gland, weight loss, change in appetite, nervousness, anxiety, irritability, tremor, sweating, heat intolerance, changes in bowel habits, muscle weakness or difficulty sleeping.      Now complaining of fatigue       PAST MEDICAL HISTORY   Past Medical History:   Diagnosis Date    Anxiety     CAD (coronary artery disease)     Carotid artery stenosis     Chronic back pain     COPD (chronic

## 2023-11-24 RX ORDER — CYCLOBENZAPRINE HCL 10 MG
10 TABLET ORAL DAILY
Qty: 30 TABLET | Refills: 0 | OUTPATIENT
Start: 2023-11-24

## 2023-11-29 ENCOUNTER — OFFICE VISIT (OUTPATIENT)
Dept: PAIN MANAGEMENT | Age: 68
End: 2023-11-29
Payer: MEDICARE

## 2023-11-29 VITALS
SYSTOLIC BLOOD PRESSURE: 152 MMHG | RESPIRATION RATE: 18 BRPM | HEART RATE: 92 BPM | HEIGHT: 71 IN | BODY MASS INDEX: 23.52 KG/M2 | DIASTOLIC BLOOD PRESSURE: 98 MMHG | WEIGHT: 168 LBS | TEMPERATURE: 96.8 F | OXYGEN SATURATION: 95 %

## 2023-11-29 DIAGNOSIS — F11.20 OPIOID DEPENDENCE WITH CURRENT USE (HCC): ICD-10-CM

## 2023-11-29 DIAGNOSIS — Z79.891 ENCOUNTER FOR LONG-TERM OPIATE ANALGESIC USE: ICD-10-CM

## 2023-11-29 DIAGNOSIS — M54.16 LUMBAR RADICULOPATHY: ICD-10-CM

## 2023-11-29 DIAGNOSIS — M54.50 CHRONIC BILATERAL LOW BACK PAIN WITHOUT SCIATICA: ICD-10-CM

## 2023-11-29 DIAGNOSIS — G71.3 MITOCHONDRIAL MYOPATHY: Primary | ICD-10-CM

## 2023-11-29 DIAGNOSIS — G89.4 CHRONIC PAIN SYNDROME: ICD-10-CM

## 2023-11-29 DIAGNOSIS — M16.11 PRIMARY OSTEOARTHRITIS OF RIGHT HIP: ICD-10-CM

## 2023-11-29 DIAGNOSIS — G89.29 CHRONIC BILATERAL LOW BACK PAIN WITHOUT SCIATICA: ICD-10-CM

## 2023-11-29 DIAGNOSIS — M47.816 LUMBAR FACET ARTHROPATHY: ICD-10-CM

## 2023-11-29 DIAGNOSIS — M47.816 LUMBAR SPONDYLOSIS: ICD-10-CM

## 2023-11-29 DIAGNOSIS — M51.9 LUMBAR DISC DISORDER: ICD-10-CM

## 2023-11-29 PROCEDURE — 99213 OFFICE O/P EST LOW 20 MIN: CPT | Performed by: PHYSICIAN ASSISTANT

## 2023-11-29 PROCEDURE — G8427 DOCREV CUR MEDS BY ELIG CLIN: HCPCS | Performed by: PHYSICIAN ASSISTANT

## 2023-11-29 PROCEDURE — 1090F PRES/ABSN URINE INCON ASSESS: CPT | Performed by: PHYSICIAN ASSISTANT

## 2023-11-29 PROCEDURE — G8420 CALC BMI NORM PARAMETERS: HCPCS | Performed by: PHYSICIAN ASSISTANT

## 2023-11-29 PROCEDURE — G8484 FLU IMMUNIZE NO ADMIN: HCPCS | Performed by: PHYSICIAN ASSISTANT

## 2023-11-29 PROCEDURE — G8399 PT W/DXA RESULTS DOCUMENT: HCPCS | Performed by: PHYSICIAN ASSISTANT

## 2023-11-29 PROCEDURE — 3017F COLORECTAL CA SCREEN DOC REV: CPT | Performed by: PHYSICIAN ASSISTANT

## 2023-11-29 PROCEDURE — 1036F TOBACCO NON-USER: CPT | Performed by: PHYSICIAN ASSISTANT

## 2023-11-29 PROCEDURE — 1123F ACP DISCUSS/DSCN MKR DOCD: CPT | Performed by: PHYSICIAN ASSISTANT

## 2023-11-29 PROCEDURE — 3077F SYST BP >= 140 MM HG: CPT | Performed by: PHYSICIAN ASSISTANT

## 2023-11-29 PROCEDURE — 3080F DIAST BP >= 90 MM HG: CPT | Performed by: PHYSICIAN ASSISTANT

## 2023-11-29 RX ORDER — OXYCODONE HYDROCHLORIDE AND ACETAMINOPHEN 5; 325 MG/1; MG/1
0.5 TABLET ORAL 3 TIMES DAILY PRN
Qty: 45 TABLET | Refills: 0 | Status: SHIPPED | OUTPATIENT
Start: 2023-11-29 | End: 2023-12-29

## 2023-11-29 RX ORDER — CYCLOBENZAPRINE HCL 10 MG
10 TABLET ORAL DAILY
Qty: 30 TABLET | Refills: 0 | Status: SHIPPED | OUTPATIENT
Start: 2023-11-29

## 2023-11-29 RX ORDER — GABAPENTIN 300 MG/1
300 CAPSULE ORAL 3 TIMES DAILY
Qty: 90 CAPSULE | Refills: 1 | Status: SHIPPED | OUTPATIENT
Start: 2023-11-29 | End: 2024-02-27

## 2023-11-29 NOTE — PROGRESS NOTES
[address], [facility] [address]  [phone]    Follow up Note      Mumtaz Shrestha     Date of Visit:  11/29/2023    CC:  Patient presents for follow up   Chief Complaint   Patient presents with    Back Pain       HPI:    Pain is unchanged. Appropriate analgesia with current medications regimen: yes. Change in quality of symptoms:no. Medication side effects:none. Recent diagnostic testing:none. Recent interventional procedures:none. .    She has been on anticoagulation medications to include Plavix/ASA. The patient  has not been on herbal supplements. The patient is not diabetic. Imaging:   Lumbar spine MRI 2021  . Advanced degenerative change with slight grade 1 retrolisthesis at L2-3   and slight grade 1 anterolisthesis at L4-5. Mild levoscoliosis. 2. Moderate central canal stenosis at L3-4 with right-sided disc protrusion   causing mild right subarticular recess stenosis and moderate right neural   foraminal stenosis. 3. Mild to moderate central canal stenosis at L2-3 with small right-sided   disc herniation causing mild right subarticular recess and neural foraminal   stenosis. 4. Small left-sided disc protrusion at L4-5 causing mild left subarticular   recess stenosis and neural foraminal stenosis. No significant central canal   stenosis at this level. 5. Subchondral signal change about L2-3 with some some edema most likely   related to severe degenerative disc disease. Clinical correlation for signs   of infection is suggested. Right hip Xray 2021:  Mild-to-moderate osteoarthritis at the right hip. Lumbar spine Xray 2016:  1. Left convex scoliosis. 2. Multilevel degenerative spondylosis throughout the lumbar spine. 3. Facet arthritis from L2-S1.   4. No acute abnormality is identified. Previous treatments: Physical Therapy and medications. .                            Potential Aberrant Drug-Related

## 2023-11-30 LAB
6-MONOACETYLMORPHINE, URINE: NEGATIVE
ABNORMAL SPECIMEN VALIDITY TEST: ABNORMAL
ALCOHOL URINE: NOT DETECTED MG/DL
AMPHETAMINE SCREEN URINE: NEGATIVE
BARBITURATE SCREEN URINE: NEGATIVE
BENZODIAZEPINE SCREEN, URINE: NEGATIVE
BUPRENORPHINE URINE: NEGATIVE
CANNABINOID SCREEN URINE: NEGATIVE
COCAINE METABOLITE, URINE: NEGATIVE
FENTANYL URINE: NEGATIVE
INTEGRITY CHECK, CREATININE, URINE: 151.2 MG/DL (ref 22–250)
INTEGRITY CHECK, OXIDANT, URINE: 203 MG/L
INTEGRITY CHECK, PH, URINE: 5.7 (ref 4.5–9)
INTEGRITY CHECK, SPECIFIC GRAVITY, URINE: 1.03 (ref 1–1.03)
METHADONE SCREEN, URINE: NEGATIVE
OPIATES, URINE: NEGATIVE
OXYCODONE SCREEN URINE: POSITIVE
PHENCYCLIDINE, URINE: NEGATIVE
TEST INFORMATION: ABNORMAL
TRAMADOL, URINE: NEGATIVE

## 2023-12-04 ENCOUNTER — HOSPITAL ENCOUNTER (OUTPATIENT)
Dept: GENERAL RADIOLOGY | Age: 68
Discharge: HOME | End: 2023-12-06
Payer: MEDICARE

## 2023-12-04 VITALS — BODY MASS INDEX: 23.52 KG/M2 | WEIGHT: 168 LBS | HEIGHT: 71 IN

## 2023-12-04 DIAGNOSIS — Z12.31 VISIT FOR SCREENING MAMMOGRAM: ICD-10-CM

## 2023-12-04 LAB
6-MAM, QUANTITATIVE, URINE: <10 NG/ML
7-AMINOCLONAZEPAM, QUANTITATIVE, URINE: <50 NG/ML
ALPHA-HYDROXYALPRAZOLAM, QUANTITATIVE, URINE: <50 NG/ML
ALPHA-HYDROXYMIDAZOLAM, QUANTITATIVE, URINE: <50 NG/ML
ALPHA-HYDROXYTRIAZOLAM, QUANTITATIVE, URINE: <50 NG/ML
ALPRAZOLAM URINE QUANT: <50 NG/ML
CHLORDIAZEPOXIDE, QUANTITATIVE, URINE: <50 NG/ML
CLONAZEPAM, QUANTITATIVE, URINE: <50 NG/ML
CODEINE, QUANTITATIVE, URINE: <50 NG/ML
COMPLIANCE DRUG ANALYSIS, URINE: NORMAL
DIAZEPAM URINE QUANT: <50 NG/ML
FLUNITRAZEPAM, QUANTITATIVE, URINE: <50 NG/ML
FLURAZEPAM, QUANTITATIVE, URINE: <50 NG/ML
HYDROCODONE, QUANTITATIVE, URINE: <50 NG/ML
HYDROMORPHONE, QUANTITATIVE, URINE: <50 NG/ML
LORAZEPAM URINE QUANT: <50 NG/ML
MIDAZOLAM URINE QUANT: <50 NG/ML
MORPHINE, QUANTITATIVE, URINE: <50 NG/ML
NORDIAZEPAM URINE QUANT: <50 NG/ML
NORHYDROCODONE, QUANTITATIVE, URINE: 89.2 NG/ML
NOROXYCODONE, QUANTITATIVE, URINE: >1000 NG/ML
OXAZEPAM URINE QUANT: <50 NG/ML
OXYCODONE URINE, QUANTITATIVE: 364.4 NG/ML
OXYMORPHONE, QUANTITATIVE, URINE: 670.6 NG/ML
TEMAZEPAM, QUANTITATIVE, URINE: <50 NG/ML

## 2023-12-04 PROCEDURE — 77063 BREAST TOMOSYNTHESIS BI: CPT

## 2023-12-13 DIAGNOSIS — E05.90 HYPERTHYROIDISM: ICD-10-CM

## 2023-12-13 DIAGNOSIS — E55.9 VITAMIN D DEFICIENCY: ICD-10-CM

## 2023-12-13 LAB
T4 FREE: 1.5 NG/DL (ref 0.9–1.7)
TSH SERPL DL<=0.05 MIU/L-ACNC: 0.24 UIU/ML (ref 0.27–4.2)
VITAMIN D 25-HYDROXY: 44.9 NG/ML (ref 30–100)

## 2023-12-14 ENCOUNTER — TELEPHONE (OUTPATIENT)
Dept: ENDOCRINOLOGY | Age: 68
End: 2023-12-14

## 2023-12-14 DIAGNOSIS — E05.90 HYPERTHYROIDISM: Primary | ICD-10-CM

## 2023-12-14 RX ORDER — METHIMAZOLE 5 MG/1
TABLET ORAL
Qty: 15 TABLET | Refills: 4 | Status: SHIPPED | OUTPATIENT
Start: 2023-12-14

## 2023-12-14 NOTE — TELEPHONE ENCOUNTER
----- Message from SEFERINO Muñiz sent at 12/14/2023  7:49 AM EST -----  Please call patient and inform her TSH is mildly suppressed. Please have patient restart methimazole 2.5 mg daily.   Patient will take methimazole 5 mg half a tablet daily

## 2023-12-21 ENCOUNTER — TELEPHONE (OUTPATIENT)
Dept: PAIN MANAGEMENT | Age: 68
End: 2023-12-21

## 2023-12-21 DIAGNOSIS — G71.3 MITOCHONDRIAL MYOPATHY: ICD-10-CM

## 2023-12-21 RX ORDER — GABAPENTIN 300 MG/1
300 CAPSULE ORAL 3 TIMES DAILY
Qty: 270 CAPSULE | OUTPATIENT
Start: 2023-12-21

## 2023-12-21 NOTE — TELEPHONE ENCOUNTER
Marina called today requesting a refill of her Gabapentin. Last filled 11/14/23, OARRS is consistent. Last visit 11/29/23, next appt is 1/10/24. Thank you

## 2024-01-05 ENCOUNTER — CLINICAL DOCUMENTATION (OUTPATIENT)
Dept: GENERAL RADIOLOGY | Age: 69
End: 2024-01-05

## 2024-01-05 NOTE — PROGRESS NOTES
Mammogram clinical report provided to patient. Report sent to Marilynn MENDOSA as per patient's request.

## 2024-01-08 ENCOUNTER — OFFICE VISIT (OUTPATIENT)
Dept: FAMILY MEDICINE CLINIC | Age: 69
End: 2024-01-08
Payer: MEDICARE

## 2024-01-08 VITALS
OXYGEN SATURATION: 94 % | DIASTOLIC BLOOD PRESSURE: 78 MMHG | HEIGHT: 71 IN | HEART RATE: 86 BPM | RESPIRATION RATE: 16 BRPM | WEIGHT: 156 LBS | BODY MASS INDEX: 21.84 KG/M2 | TEMPERATURE: 97.3 F | SYSTOLIC BLOOD PRESSURE: 122 MMHG

## 2024-01-08 DIAGNOSIS — R19.7 DIARRHEA, UNSPECIFIED TYPE: Primary | ICD-10-CM

## 2024-01-08 DIAGNOSIS — R19.7 DIARRHEA, UNSPECIFIED TYPE: ICD-10-CM

## 2024-01-08 LAB
ABSOLUTE IMMATURE GRANULOCYTE: 0.05 K/UL (ref 0–0.58)
ALBUMIN SERPL-MCNC: 4.1 G/DL (ref 3.5–5.2)
ALP BLD-CCNC: 109 U/L (ref 35–104)
ALT SERPL-CCNC: 12 U/L (ref 0–32)
ANION GAP SERPL CALCULATED.3IONS-SCNC: 11 MMOL/L (ref 7–16)
AST SERPL-CCNC: 17 U/L (ref 0–31)
BASOPHILS ABSOLUTE: 0.06 K/UL (ref 0–0.2)
BASOPHILS RELATIVE PERCENT: 1 % (ref 0–2)
BILIRUB SERPL-MCNC: 0.4 MG/DL (ref 0–1.2)
BUN BLDV-MCNC: 8 MG/DL (ref 6–23)
CALCIUM SERPL-MCNC: 9.5 MG/DL (ref 8.6–10.2)
CHLORIDE BLD-SCNC: 100 MMOL/L (ref 98–107)
CO2: 26 MMOL/L (ref 22–29)
CREAT SERPL-MCNC: 0.6 MG/DL (ref 0.5–1)
EOSINOPHILS ABSOLUTE: 0.09 K/UL (ref 0.05–0.5)
EOSINOPHILS RELATIVE PERCENT: 1 % (ref 0–6)
GFR SERPL CREATININE-BSD FRML MDRD: >60 ML/MIN/1.73M2
GLUCOSE BLD-MCNC: 135 MG/DL (ref 74–99)
HCT VFR BLD CALC: 45.6 % (ref 34–48)
HEMOGLOBIN: 15.2 G/DL (ref 11.5–15.5)
IMMATURE GRANULOCYTES: 1 % (ref 0–5)
LYMPHOCYTES ABSOLUTE: 2.06 K/UL (ref 1.5–4)
LYMPHOCYTES RELATIVE PERCENT: 28 % (ref 20–42)
MCH RBC QN AUTO: 30.2 PG (ref 26–35)
MCHC RBC AUTO-ENTMCNC: 33.3 G/DL (ref 32–34.5)
MCV RBC AUTO: 90.5 FL (ref 80–99.9)
MONOCYTES ABSOLUTE: 0.42 K/UL (ref 0.1–0.95)
MONOCYTES RELATIVE PERCENT: 6 % (ref 2–12)
NEUTROPHILS ABSOLUTE: 4.73 K/UL (ref 1.8–7.3)
NEUTROPHILS RELATIVE PERCENT: 64 % (ref 43–80)
PDW BLD-RTO: 13 % (ref 11.5–15)
PLATELET # BLD: 220 K/UL (ref 130–450)
PMV BLD AUTO: 10.4 FL (ref 7–12)
POTASSIUM SERPL-SCNC: 4.1 MMOL/L (ref 3.5–5)
RBC # BLD: 5.04 M/UL (ref 3.5–5.5)
SODIUM BLD-SCNC: 137 MMOL/L (ref 132–146)
TOTAL PROTEIN: 7 G/DL (ref 6.4–8.3)
WBC # BLD: 7.4 K/UL (ref 4.5–11.5)

## 2024-01-08 PROCEDURE — 99213 OFFICE O/P EST LOW 20 MIN: CPT | Performed by: NURSE PRACTITIONER

## 2024-01-08 PROCEDURE — 3074F SYST BP LT 130 MM HG: CPT | Performed by: NURSE PRACTITIONER

## 2024-01-08 PROCEDURE — G8427 DOCREV CUR MEDS BY ELIG CLIN: HCPCS | Performed by: NURSE PRACTITIONER

## 2024-01-08 PROCEDURE — G8484 FLU IMMUNIZE NO ADMIN: HCPCS | Performed by: NURSE PRACTITIONER

## 2024-01-08 PROCEDURE — G8420 CALC BMI NORM PARAMETERS: HCPCS | Performed by: NURSE PRACTITIONER

## 2024-01-08 PROCEDURE — 1090F PRES/ABSN URINE INCON ASSESS: CPT | Performed by: NURSE PRACTITIONER

## 2024-01-08 PROCEDURE — 1036F TOBACCO NON-USER: CPT | Performed by: NURSE PRACTITIONER

## 2024-01-08 PROCEDURE — 3017F COLORECTAL CA SCREEN DOC REV: CPT | Performed by: NURSE PRACTITIONER

## 2024-01-08 PROCEDURE — 1123F ACP DISCUSS/DSCN MKR DOCD: CPT | Performed by: NURSE PRACTITIONER

## 2024-01-08 PROCEDURE — G8399 PT W/DXA RESULTS DOCUMENT: HCPCS | Performed by: NURSE PRACTITIONER

## 2024-01-08 PROCEDURE — 3078F DIAST BP <80 MM HG: CPT | Performed by: NURSE PRACTITIONER

## 2024-01-08 ASSESSMENT — PATIENT HEALTH QUESTIONNAIRE - PHQ9
9. THOUGHTS THAT YOU WOULD BE BETTER OFF DEAD, OR OF HURTING YOURSELF: 0
SUM OF ALL RESPONSES TO PHQ QUESTIONS 1-9: 0
SUM OF ALL RESPONSES TO PHQ QUESTIONS 1-9: 0
2. FEELING DOWN, DEPRESSED OR HOPELESS: 0
5. POOR APPETITE OR OVEREATING: 0
10. IF YOU CHECKED OFF ANY PROBLEMS, HOW DIFFICULT HAVE THESE PROBLEMS MADE IT FOR YOU TO DO YOUR WORK, TAKE CARE OF THINGS AT HOME, OR GET ALONG WITH OTHER PEOPLE: 0
6. FEELING BAD ABOUT YOURSELF - OR THAT YOU ARE A FAILURE OR HAVE LET YOURSELF OR YOUR FAMILY DOWN: 0
4. FEELING TIRED OR HAVING LITTLE ENERGY: 0
SUM OF ALL RESPONSES TO PHQ QUESTIONS 1-9: 0
SUM OF ALL RESPONSES TO PHQ QUESTIONS 1-9: 0
SUM OF ALL RESPONSES TO PHQ9 QUESTIONS 1 & 2: 0
3. TROUBLE FALLING OR STAYING ASLEEP: 0
8. MOVING OR SPEAKING SO SLOWLY THAT OTHER PEOPLE COULD HAVE NOTICED. OR THE OPPOSITE, BEING SO FIGETY OR RESTLESS THAT YOU HAVE BEEN MOVING AROUND A LOT MORE THAN USUAL: 0
1. LITTLE INTEREST OR PLEASURE IN DOING THINGS: 0
7. TROUBLE CONCENTRATING ON THINGS, SUCH AS READING THE NEWSPAPER OR WATCHING TELEVISION: 0

## 2024-01-08 ASSESSMENT — ENCOUNTER SYMPTOMS
NAUSEA: 0
VOMITING: 0
COUGH: 0
WHEEZING: 0
DIARRHEA: 1
SHORTNESS OF BREATH: 0
CONSTIPATION: 0
ABDOMINAL PAIN: 0

## 2024-01-08 NOTE — ASSESSMENT & PLAN NOTE
New over the last 6 weeks she did have the tapazole discontinued prior to the diarrhea starting but discussed could also be infectious. Will check labs and stool cultures, fecal leukocytes and fecal calprotectin, fat content.

## 2024-01-08 NOTE — PROGRESS NOTES
OFFICE PROGRESS NOTE  [facility]  [address] [place] [address]  Dept: [phone]   Chief Complaint   Patient presents with    GI Problem     Has pain in the upper abdominal and lower abdominal area, if does not eat is okay. When eats does not know what is going to happen, diarrhea, bloating/gas. X6 weeks. Is using omeprazole and pepto bismol, has some relief not much. Losing weight.      medication     Just has pain medication changed, thinks is percocet from norco from pain management        ASSESSMENT/PLAN   1. Diarrhea, unspecified type  Assessment & Plan:   New over the last 6 weeks she did have the tapazole discontinued prior to the diarrhea starting but discussed could also be infectious. Will check labs and stool cultures, fecal leukocytes and fecal calprotectin, fat content.   Orders:  -     Comprehensive Metabolic Panel; Future  -     CBC with Auto Differential; Future  -     Culture, Stool; Future  -     Fecal lactoferrin; Future  -     Calprotectin Stool; Future  -     Fecal Fat, Qualitative; Future  -     Fecal Fat, Quantitative; Future     I will call with results and she may need to see GI    Reviewed labs:  Reviewed notes from colonoscopy 2021 [facility]  Reviewed radiology     Discussed taking medications as directed and adverse effects    Return if symptoms worsen or fail to improve, for keep April appt .     HPI:   Here today with complaints of diarrhea uncontrollable started about 6 weeks ago her kids were coming for [place] so she let it go. she says she has no control and will start about 20 minutes after she eats, now accompanied by abdominal cramps, and pain she says sometimes she can't stand to touch the upper epigastric area but doesn't happen all the time. She had cholecystectomy about 25 years ago. She can't remember for sure if she ate in a restaurant or not prior to this starting. She is having diarrhea 3 - 4 times per day as she is

## 2024-01-09 ENCOUNTER — HOSPITAL ENCOUNTER (OUTPATIENT)
Age: 69
Setting detail: SPECIMEN
Discharge: HOME | End: 2024-01-09
Payer: MEDICARE

## 2024-01-09 DIAGNOSIS — R19.7 DIARRHEA, UNSPECIFIED TYPE: ICD-10-CM

## 2024-01-09 PROCEDURE — 87046 STOOL CULTR AEROBIC BACT EA: CPT

## 2024-01-09 PROCEDURE — 83630 LACTOFERRIN FECAL (QUAL): CPT

## 2024-01-09 PROCEDURE — 82705 FATS/LIPIDS FECES QUAL: CPT

## 2024-01-09 PROCEDURE — 83993 ASSAY FOR CALPROTECTIN FECAL: CPT

## 2024-01-09 PROCEDURE — 87045 FECES CULTURE AEROBIC BACT: CPT

## 2024-01-09 PROCEDURE — 87427 SHIGA-LIKE TOXIN AG IA: CPT

## 2024-01-10 ENCOUNTER — OFFICE VISIT (OUTPATIENT)
Dept: PAIN MANAGEMENT | Age: 69
End: 2024-01-10
Payer: MEDICARE

## 2024-01-10 VITALS
RESPIRATION RATE: 16 BRPM | TEMPERATURE: 96.9 F | OXYGEN SATURATION: 98 % | SYSTOLIC BLOOD PRESSURE: 136 MMHG | DIASTOLIC BLOOD PRESSURE: 84 MMHG | HEART RATE: 66 BPM

## 2024-01-10 DIAGNOSIS — G71.3 MITOCHONDRIAL MYOPATHY: Primary | ICD-10-CM

## 2024-01-10 DIAGNOSIS — M54.16 LUMBAR RADICULOPATHY: ICD-10-CM

## 2024-01-10 DIAGNOSIS — Z79.891 ENCOUNTER FOR LONG-TERM OPIATE ANALGESIC USE: ICD-10-CM

## 2024-01-10 DIAGNOSIS — F11.20 OPIOID DEPENDENCE WITH CURRENT USE (HCC): ICD-10-CM

## 2024-01-10 DIAGNOSIS — G89.4 CHRONIC PAIN SYNDROME: ICD-10-CM

## 2024-01-10 DIAGNOSIS — M16.11 PRIMARY OSTEOARTHRITIS OF RIGHT HIP: ICD-10-CM

## 2024-01-10 DIAGNOSIS — M51.9 LUMBAR DISC DISORDER: ICD-10-CM

## 2024-01-10 DIAGNOSIS — M47.816 LUMBAR FACET ARTHROPATHY: ICD-10-CM

## 2024-01-10 DIAGNOSIS — M47.816 LUMBAR SPONDYLOSIS: ICD-10-CM

## 2024-01-10 DIAGNOSIS — G89.29 CHRONIC BILATERAL LOW BACK PAIN WITHOUT SCIATICA: ICD-10-CM

## 2024-01-10 DIAGNOSIS — M54.50 CHRONIC BILATERAL LOW BACK PAIN WITHOUT SCIATICA: ICD-10-CM

## 2024-01-10 PROCEDURE — G8420 CALC BMI NORM PARAMETERS: HCPCS | Performed by: PHYSICIAN ASSISTANT

## 2024-01-10 PROCEDURE — 1123F ACP DISCUSS/DSCN MKR DOCD: CPT | Performed by: PHYSICIAN ASSISTANT

## 2024-01-10 PROCEDURE — 99213 OFFICE O/P EST LOW 20 MIN: CPT | Performed by: PHYSICIAN ASSISTANT

## 2024-01-10 PROCEDURE — G8399 PT W/DXA RESULTS DOCUMENT: HCPCS | Performed by: PHYSICIAN ASSISTANT

## 2024-01-10 PROCEDURE — 3017F COLORECTAL CA SCREEN DOC REV: CPT | Performed by: PHYSICIAN ASSISTANT

## 2024-01-10 PROCEDURE — G8427 DOCREV CUR MEDS BY ELIG CLIN: HCPCS | Performed by: PHYSICIAN ASSISTANT

## 2024-01-10 PROCEDURE — 3075F SYST BP GE 130 - 139MM HG: CPT | Performed by: PHYSICIAN ASSISTANT

## 2024-01-10 PROCEDURE — 1036F TOBACCO NON-USER: CPT | Performed by: PHYSICIAN ASSISTANT

## 2024-01-10 PROCEDURE — G8484 FLU IMMUNIZE NO ADMIN: HCPCS | Performed by: PHYSICIAN ASSISTANT

## 2024-01-10 PROCEDURE — 99213 OFFICE O/P EST LOW 20 MIN: CPT

## 2024-01-10 PROCEDURE — 3079F DIAST BP 80-89 MM HG: CPT | Performed by: PHYSICIAN ASSISTANT

## 2024-01-10 PROCEDURE — 1090F PRES/ABSN URINE INCON ASSESS: CPT | Performed by: PHYSICIAN ASSISTANT

## 2024-01-10 RX ORDER — GABAPENTIN 300 MG/1
300 CAPSULE ORAL 3 TIMES DAILY
Qty: 90 CAPSULE | Refills: 1 | Status: SHIPPED | OUTPATIENT
Start: 2024-01-10 | End: 2024-04-09

## 2024-01-10 RX ORDER — CYCLOBENZAPRINE HCL 10 MG
10 TABLET ORAL DAILY
Qty: 30 TABLET | Refills: 0 | Status: SHIPPED | OUTPATIENT
Start: 2024-01-10

## 2024-01-10 RX ORDER — OXYCODONE HYDROCHLORIDE AND ACETAMINOPHEN 5; 325 MG/1; MG/1
0.5 TABLET ORAL 3 TIMES DAILY PRN
Qty: 45 TABLET | Refills: 0 | Status: SHIPPED | OUTPATIENT
Start: 2024-01-10 | End: 2024-02-09

## 2024-01-10 NOTE — PROGRESS NOTES
[facility]  [address] [facility], [place] [address]  [phone]    Follow up Note      Marina Green     Date of Visit:  1/10/2024    CC:  Patient presents for follow up   Chief Complaint   Patient presents with    Follow-up    Lower Back Pain       HPI:    Pain is unchanged.    Appropriate analgesia with current medications regimen: yes.    Change in quality of symptoms:no.    Medication side effects:none.   Recent diagnostic testing:none.   Recent interventional procedures:none..    She has been on anticoagulation medications to include Plavix/ASA. The patient  has not been on herbal supplements.  The patient is not diabetic.     Imaging:   Lumbar spine MRI 2021  . Advanced degenerative change with slight grade 1 retrolisthesis at L2-3   and slight grade 1 anterolisthesis at L4-5.  Mild levoscoliosis.   2. Moderate central canal stenosis at L3-4 with right-sided disc protrusion   causing mild right subarticular recess stenosis and moderate right neural   foraminal stenosis.   3. Mild to moderate central canal stenosis at L2-3 with small right-sided   disc herniation causing mild right subarticular recess and neural foraminal   stenosis.   4. Small left-sided disc protrusion at L4-5 causing mild left subarticular   recess stenosis and neural foraminal stenosis.  No significant central canal   stenosis at this level.   5. Subchondral signal change about L2-3 with some some edema most likely   related to severe degenerative disc disease.  Clinical correlation for signs   of infection is suggested.      Right hip Xray 2021:  Mild-to-moderate osteoarthritis at the right hip.     Lumbar spine Xray 2016:  1. Left convex scoliosis.   2. Multilevel degenerative spondylosis throughout the lumbar spine.   3. Facet arthritis from L2-S1.   4. No acute abnormality is identified.      Previous treatments: Physical Therapy and medications..                            Potential Aberrant

## 2024-01-10 NOTE — PROGRESS NOTES
Marina Green presents to the [facility] on 1/10/2024. Marina is complaining of pain in her lower back. The pain is persistent. The pain is described as aching and burning. Pain is rated on her best day at a 3, on her worst day at a 10, and on average at a 5 on the VAS scale. She took her last dose of Percocet last night.      Any procedures since your last visit: No.    She is not on NSAIDS and  is  on anticoagulation medications to include Plavix and is managed by Dr Murphy.     Pacemaker or defibrillator: No Physician managing device is n/a.    Medication Contract and Consent for Opioid Use Documents Filed       Patient Documents       Type of Document Status Date Received Received By Description    Consent Opioid Use Received 3/14/2023  1:25 PM GET SIMS pt agreement 3/14/23                       /84   Pulse 66   Temp 96.9 °F (36.1 °C) (Infrared)   Resp 16   SpO2 98%      No LMP recorded. Patient has had a hysterectomy.

## 2024-01-11 LAB
MICROORGANISM SPEC CULT: NORMAL
MICROORGANISM SPEC CULT: NORMAL
SPECIMEN DESCRIPTION: NORMAL

## 2024-01-12 DIAGNOSIS — G71.3 MITOCHONDRIAL MYOPATHY: ICD-10-CM

## 2024-01-12 LAB
CALPROTECTIN, FECAL: 100 UG/G
FAT QUALITATIVE SPLIT STOOL: ABNORMAL
FECAL NEUTRAL FAT: NORMAL
LACTOFERRIN STL QL: ABNORMAL

## 2024-01-18 RX ORDER — CYCLOBENZAPRINE HCL 10 MG
10 TABLET ORAL DAILY
Qty: 30 TABLET | Refills: 0 | OUTPATIENT
Start: 2024-01-18

## 2024-02-06 DIAGNOSIS — E05.90 HYPERTHYROIDISM: ICD-10-CM

## 2024-02-07 LAB
T4 FREE: 1.2 NG/DL (ref 0.9–1.7)
TSH SERPL DL<=0.05 MIU/L-ACNC: 2.97 UIU/ML (ref 0.27–4.2)

## 2024-02-13 ENCOUNTER — OFFICE VISIT (OUTPATIENT)
Dept: PAIN MANAGEMENT | Age: 69
End: 2024-02-13
Payer: MEDICARE

## 2024-02-13 VITALS
WEIGHT: 156 LBS | DIASTOLIC BLOOD PRESSURE: 60 MMHG | HEART RATE: 97 BPM | TEMPERATURE: 96.8 F | SYSTOLIC BLOOD PRESSURE: 106 MMHG | RESPIRATION RATE: 18 BRPM | OXYGEN SATURATION: 99 % | BODY MASS INDEX: 21.84 KG/M2 | HEIGHT: 71 IN

## 2024-02-13 DIAGNOSIS — G89.29 CHRONIC BILATERAL LOW BACK PAIN WITHOUT SCIATICA: ICD-10-CM

## 2024-02-13 DIAGNOSIS — Z79.891 ENCOUNTER FOR LONG-TERM OPIATE ANALGESIC USE: ICD-10-CM

## 2024-02-13 DIAGNOSIS — M51.9 LUMBAR DISC DISORDER: ICD-10-CM

## 2024-02-13 DIAGNOSIS — M47.816 LUMBAR FACET ARTHROPATHY: ICD-10-CM

## 2024-02-13 DIAGNOSIS — G71.3 MITOCHONDRIAL MYOPATHY: ICD-10-CM

## 2024-02-13 DIAGNOSIS — M54.16 LUMBAR RADICULOPATHY: ICD-10-CM

## 2024-02-13 DIAGNOSIS — G89.4 CHRONIC PAIN SYNDROME: Primary | ICD-10-CM

## 2024-02-13 DIAGNOSIS — M16.11 PRIMARY OSTEOARTHRITIS OF RIGHT HIP: ICD-10-CM

## 2024-02-13 DIAGNOSIS — M54.50 CHRONIC BILATERAL LOW BACK PAIN WITHOUT SCIATICA: ICD-10-CM

## 2024-02-13 DIAGNOSIS — F11.20 OPIOID DEPENDENCE WITH CURRENT USE (HCC): ICD-10-CM

## 2024-02-13 DIAGNOSIS — M47.816 LUMBAR SPONDYLOSIS: ICD-10-CM

## 2024-02-13 PROCEDURE — 1090F PRES/ABSN URINE INCON ASSESS: CPT | Performed by: PHYSICIAN ASSISTANT

## 2024-02-13 PROCEDURE — G8420 CALC BMI NORM PARAMETERS: HCPCS | Performed by: PHYSICIAN ASSISTANT

## 2024-02-13 PROCEDURE — 99213 OFFICE O/P EST LOW 20 MIN: CPT | Performed by: PHYSICIAN ASSISTANT

## 2024-02-13 PROCEDURE — 3078F DIAST BP <80 MM HG: CPT | Performed by: PHYSICIAN ASSISTANT

## 2024-02-13 PROCEDURE — G8427 DOCREV CUR MEDS BY ELIG CLIN: HCPCS | Performed by: PHYSICIAN ASSISTANT

## 2024-02-13 PROCEDURE — 3074F SYST BP LT 130 MM HG: CPT | Performed by: PHYSICIAN ASSISTANT

## 2024-02-13 PROCEDURE — 1123F ACP DISCUSS/DSCN MKR DOCD: CPT | Performed by: PHYSICIAN ASSISTANT

## 2024-02-13 PROCEDURE — 3017F COLORECTAL CA SCREEN DOC REV: CPT | Performed by: PHYSICIAN ASSISTANT

## 2024-02-13 PROCEDURE — 1036F TOBACCO NON-USER: CPT | Performed by: PHYSICIAN ASSISTANT

## 2024-02-13 PROCEDURE — G8484 FLU IMMUNIZE NO ADMIN: HCPCS | Performed by: PHYSICIAN ASSISTANT

## 2024-02-13 PROCEDURE — G8399 PT W/DXA RESULTS DOCUMENT: HCPCS | Performed by: PHYSICIAN ASSISTANT

## 2024-02-13 RX ORDER — GABAPENTIN 300 MG/1
300 CAPSULE ORAL 3 TIMES DAILY
Qty: 90 CAPSULE | Refills: 0 | Status: SHIPPED | OUTPATIENT
Start: 2024-02-13 | End: 2024-05-13

## 2024-02-13 RX ORDER — SODIUM, POTASSIUM,MAG SULFATES 17.5-3.13G
SOLUTION, RECONSTITUTED, ORAL ORAL
COMMUNITY
Start: 2024-02-07

## 2024-02-13 RX ORDER — CYCLOBENZAPRINE HCL 10 MG
10 TABLET ORAL DAILY
Qty: 30 TABLET | Refills: 0 | Status: SHIPPED | OUTPATIENT
Start: 2024-02-13

## 2024-02-13 RX ORDER — OXYCODONE HYDROCHLORIDE AND ACETAMINOPHEN 5; 325 MG/1; MG/1
0.5 TABLET ORAL 3 TIMES DAILY PRN
Qty: 45 TABLET | Refills: 0 | Status: SHIPPED | OUTPATIENT
Start: 2024-02-13 | End: 2024-03-14

## 2024-02-13 NOTE — PROGRESS NOTES
Marina Green presents to the [facility] on 2/13/2024. Marina is complaining of pain back. The pain is constant. The pain is described as aching, throbbing, stabbing, sharp, and burning. Pain is rated on her best day at a 4, on her worst day at a 8, and on average at a 7 on the VAS scale. She took her last dose of Percocet, Neurontin, and Flexeril @ [facility].      Any procedures since your last visit: No  She is not on NSAIDS and  is  on anticoagulation medications to include Plavix and is managed by SEFERINO Payne.     Pacemaker or defibrillator: No  Medication Contract and Consent for Opioid Use Documents Filed       Patient Documents       Type of Document Status Date Received Received By Description    Consent Opioid Use Received 3/14/2023  1:25 PM GET SIMS pt agreement 3/14/23                       Resp 18   Ht 1.803 m (5' 11\")   Wt 70.8 kg (156 lb)   BMI 21.76 kg/m²      No LMP recorded. Patient has had a hysterectomy.    
  No     Urine Drug Screening:  Urine screen 02/2022 showed no opioids which is consistent  12/2022 Consistent  11/2023 Consistent     OARRS report:  07/2022 consistent to 06/2023 consistent (7/23 - OARRS not working today)  09/2023 to 02/2024 consistent     Opioid Agreement:  Updated: 03/14/2023 - update today.       Past Medical History:   Diagnosis Date    Anxiety     CAD (coronary artery disease)     Carotid artery stenosis     Chronic back pain     Chronic obstructive pulmonary disease, unspecified COPD type (HCC) 01/05/2022    COPD (chronic obstructive pulmonary disease) (HCC)     Depression     GERD (gastroesophageal reflux disease)     Hyperlipidemia     Hypertension     Hyperthyroidism     Mitochondrial disease (HCC)     Need for vaccination against Streptococcus pneumoniae 06/16/2022    Neuropathy        Past Surgical History:   Procedure Laterality Date    ABDOMINAL EXPLORATION SURGERY      APPENDECTOMY      BREAST BIOPSY      CHOLECYSTECTOMY      CORONARY ANGIOPLASTY WITH STENT PLACEMENT  2014    HYSTERECTOMY (CERVIX STATUS UNKNOWN)      NECK SURGERY      PAIN MANAGEMENT PROCEDURE N/A 02/07/2022    LUMBAR EPIDURAL STEROID INJECTION L3-4 WITH 80 DEPO performed by Austin Medrano MD at [facility] OR    PARTIAL HYSTERECTOMY (CERVIX NOT REMOVED)      TONSILLECTOMY         Prior to Admission medications    Medication Sig Start Date End Date Taking? Authorizing Provider   sodium-potassium-mag sulfate (SUPREP) 17.5-3.13-1.6 GM/177ML SOLN solution USE AS DIRECTED 2/7/24  Yes Provider, MD Kush   gabapentin (NEURONTIN) 300 MG capsule Take 1 capsule by mouth 3 times daily for 90 days. 1/10/24 4/9/24 Yes Vianey Aleman PA   cyclobenzaprine (FLEXERIL) 10 MG tablet Take 1 tablet by mouth daily 1/10/24  Yes Vianey Aleman PA   methIMAzole (TAPAZOLE) 5 MG tablet Take 0.5 tablet daily 12/14/23  Yes Cornelius Salgado, APRN - CNS   atorvastatin (LIPITOR) 10 MG tablet Take 1 tablet by mouth daily 10/25/23

## 2024-02-14 ENCOUNTER — TELEPHONE (OUTPATIENT)
Dept: ENDOCRINOLOGY | Age: 69
End: 2024-02-14

## 2024-02-14 NOTE — TELEPHONE ENCOUNTER
----- Message from SEFERINO Nelson - CNS sent at 2/8/2024  8:19 AM EST -----  Please call patient and inform her thyroid function is normal.  This is an excellent result.  Continue methimazole half a tablet daily

## 2024-03-08 ENCOUNTER — OFFICE VISIT (OUTPATIENT)
Dept: FAMILY MEDICINE CLINIC | Age: 69
End: 2024-03-08
Payer: MEDICARE

## 2024-03-08 VITALS
TEMPERATURE: 97.4 F | OXYGEN SATURATION: 98 % | BODY MASS INDEX: 21.84 KG/M2 | RESPIRATION RATE: 18 BRPM | HEIGHT: 71 IN | SYSTOLIC BLOOD PRESSURE: 128 MMHG | DIASTOLIC BLOOD PRESSURE: 78 MMHG | WEIGHT: 156 LBS

## 2024-03-08 DIAGNOSIS — G89.29 CHRONIC MIDLINE LOW BACK PAIN WITHOUT SCIATICA: Primary | ICD-10-CM

## 2024-03-08 DIAGNOSIS — M54.50 CHRONIC MIDLINE LOW BACK PAIN WITHOUT SCIATICA: Primary | ICD-10-CM

## 2024-03-08 PROCEDURE — G8484 FLU IMMUNIZE NO ADMIN: HCPCS

## 2024-03-08 PROCEDURE — 99213 OFFICE O/P EST LOW 20 MIN: CPT

## 2024-03-08 PROCEDURE — G8420 CALC BMI NORM PARAMETERS: HCPCS

## 2024-03-08 PROCEDURE — G8399 PT W/DXA RESULTS DOCUMENT: HCPCS

## 2024-03-08 PROCEDURE — 3017F COLORECTAL CA SCREEN DOC REV: CPT

## 2024-03-08 PROCEDURE — G8427 DOCREV CUR MEDS BY ELIG CLIN: HCPCS

## 2024-03-08 PROCEDURE — 1036F TOBACCO NON-USER: CPT

## 2024-03-08 PROCEDURE — 1090F PRES/ABSN URINE INCON ASSESS: CPT

## 2024-03-08 PROCEDURE — 96372 THER/PROPH/DIAG INJ SC/IM: CPT

## 2024-03-08 PROCEDURE — 3078F DIAST BP <80 MM HG: CPT

## 2024-03-08 PROCEDURE — 1123F ACP DISCUSS/DSCN MKR DOCD: CPT

## 2024-03-08 PROCEDURE — 3074F SYST BP LT 130 MM HG: CPT

## 2024-03-08 RX ORDER — DEXAMETHASONE SODIUM PHOSPHATE 10 MG/ML
10 INJECTION INTRAMUSCULAR; INTRAVENOUS ONCE
Status: COMPLETED | OUTPATIENT
Start: 2024-03-08 | End: 2024-03-08

## 2024-03-08 RX ORDER — METHYLPREDNISOLONE 4 MG/1
TABLET ORAL
Qty: 1 KIT | Refills: 0 | Status: SHIPPED | OUTPATIENT
Start: 2024-03-08

## 2024-03-08 RX ADMIN — DEXAMETHASONE SODIUM PHOSPHATE 10 MG: 10 INJECTION INTRAMUSCULAR; INTRAVENOUS at 13:00

## 2024-03-08 NOTE — PROGRESS NOTES
Chief Complaint   Back Pain (Back muscles feel like they are on fire )      History of Present Illness   Source of history provided by:  patient.        Marina Green is a 68 y.o. old female presenting to the walk in clinic for evaluation of middle lumbar back pain for the past 2 days. Pt denies any known injury. Pt has  had back problems in the past.  Pt states the pain is worse with movement and improves with lying flat. There is no radiation of the pain into the buttocks/leg. Pt denies any bowel/bladder incontinence, abdominal pain, hematuria, N/V/D, fever, chills, HA, neck pain, recent illness, dysuria, or lethargy. Patient currently being seen through pain management, pt states that her appointment isn't until the end of the month.     ROS    Unless otherwise stated in this report or unable to obtain because of the patient's clinical or mental status as evidenced by the medical record, this patients's positive and negative responses for Review of Systems, constitutional, psych, eyes, ENT, cardiovascular, respiratory, gastrointestinal, neurological, genitourinary, musculoskeletal, integument systems and systems related to the presenting problem are either stated in the preceding or were not pertinent or were negative for the symptoms and/or complaints related to the medical problem.      Physical Exam         VS:  /78   Temp 97.4 °F (36.3 °C) (Temporal)   Resp 18   Ht 1.803 m (5' 10.98\")   Wt 70.8 kg (156 lb)   SpO2 98%   BMI 21.77 kg/m²    Oxygen Saturation Interpretation: Normal.    Constitutional:  Alert, development consistent with age.  Neck:  Normal ROM.  Supple.  No TTP.    Lungs:  Clear to auscultation and breath sounds equal.  Heart:  Regular rate and rhythm, normal heart sounds, without pathological murmurs, ectopy, gallops, or rubs.  Abdomen:  Soft, nontender, good bowel sounds.  No firm or pulsatile mass.  Back: Tenderness: Tenderness to palpation over midline lumbar region

## 2024-03-12 DIAGNOSIS — G71.3 MITOCHONDRIAL MYOPATHY: ICD-10-CM

## 2024-03-12 RX ORDER — CYCLOBENZAPRINE HCL 10 MG
10 TABLET ORAL DAILY
Qty: 30 TABLET | Refills: 0 | OUTPATIENT
Start: 2024-03-12

## 2024-03-20 ENCOUNTER — HOSPITAL ENCOUNTER (OUTPATIENT)
Age: 69
Discharge: HOME | End: 2024-03-20
Attending: INTERNAL MEDICINE
Payer: MEDICARE

## 2024-03-20 ENCOUNTER — HOSPITAL ENCOUNTER (OUTPATIENT)
Dept: CT IMAGING | Age: 69
Discharge: HOME | End: 2024-03-20
Attending: INTERNAL MEDICINE
Payer: MEDICARE

## 2024-03-20 DIAGNOSIS — R63.4 ABNORMAL WEIGHT LOSS: ICD-10-CM

## 2024-03-20 LAB
BUN SERPL-MCNC: 12 MG/DL (ref 6–23)
CREAT SERPL-MCNC: 0.6 MG/DL (ref 0.5–1)
GFR SERPL CREATININE-BSD FRML MDRD: >60 ML/MIN/1.73M2

## 2024-03-20 PROCEDURE — 36415 COLL VENOUS BLD VENIPUNCTURE: CPT

## 2024-03-20 PROCEDURE — 74177 CT ABD & PELVIS W/CONTRAST: CPT

## 2024-03-20 PROCEDURE — 6360000004 HC RX CONTRAST MEDICATION: Performed by: RADIOLOGY

## 2024-03-20 PROCEDURE — 84520 ASSAY OF UREA NITROGEN: CPT

## 2024-03-20 PROCEDURE — 82565 ASSAY OF CREATININE: CPT

## 2024-03-20 RX ADMIN — IOPAMIDOL 93 ML: 755 INJECTION, SOLUTION INTRAVENOUS at 12:41

## 2024-03-22 DIAGNOSIS — G71.3 MITOCHONDRIAL MYOPATHY: ICD-10-CM

## 2024-03-22 RX ORDER — GABAPENTIN 300 MG/1
300 CAPSULE ORAL 3 TIMES DAILY
Qty: 90 CAPSULE | Refills: 0 | Status: SHIPPED | OUTPATIENT
Start: 2024-03-22 | End: 2024-06-20

## 2024-03-26 ENCOUNTER — OFFICE VISIT (OUTPATIENT)
Dept: PAIN MANAGEMENT | Age: 69
End: 2024-03-26
Payer: MEDICARE

## 2024-03-26 VITALS
HEART RATE: 80 BPM | SYSTOLIC BLOOD PRESSURE: 158 MMHG | OXYGEN SATURATION: 97 % | BODY MASS INDEX: 21.84 KG/M2 | WEIGHT: 156 LBS | DIASTOLIC BLOOD PRESSURE: 90 MMHG | RESPIRATION RATE: 18 BRPM | HEIGHT: 71 IN

## 2024-03-26 DIAGNOSIS — M51.9 LUMBAR DISC DISORDER: ICD-10-CM

## 2024-03-26 DIAGNOSIS — M47.816 LUMBAR FACET ARTHROPATHY: ICD-10-CM

## 2024-03-26 DIAGNOSIS — G89.4 CHRONIC PAIN SYNDROME: Primary | ICD-10-CM

## 2024-03-26 DIAGNOSIS — M54.50 CHRONIC BILATERAL LOW BACK PAIN WITHOUT SCIATICA: ICD-10-CM

## 2024-03-26 DIAGNOSIS — M16.11 PRIMARY OSTEOARTHRITIS OF RIGHT HIP: ICD-10-CM

## 2024-03-26 DIAGNOSIS — M47.816 LUMBAR SPONDYLOSIS: ICD-10-CM

## 2024-03-26 DIAGNOSIS — Z79.891 ENCOUNTER FOR LONG-TERM OPIATE ANALGESIC USE: ICD-10-CM

## 2024-03-26 DIAGNOSIS — M54.16 LUMBAR RADICULOPATHY: ICD-10-CM

## 2024-03-26 DIAGNOSIS — F11.20 OPIOID DEPENDENCE WITH CURRENT USE (HCC): ICD-10-CM

## 2024-03-26 DIAGNOSIS — G71.3 MITOCHONDRIAL MYOPATHY: ICD-10-CM

## 2024-03-26 DIAGNOSIS — G89.29 CHRONIC BILATERAL LOW BACK PAIN WITHOUT SCIATICA: ICD-10-CM

## 2024-03-26 PROCEDURE — 3017F COLORECTAL CA SCREEN DOC REV: CPT | Performed by: PHYSICIAN ASSISTANT

## 2024-03-26 PROCEDURE — 3080F DIAST BP >= 90 MM HG: CPT | Performed by: PHYSICIAN ASSISTANT

## 2024-03-26 PROCEDURE — 1090F PRES/ABSN URINE INCON ASSESS: CPT | Performed by: PHYSICIAN ASSISTANT

## 2024-03-26 PROCEDURE — G8420 CALC BMI NORM PARAMETERS: HCPCS | Performed by: PHYSICIAN ASSISTANT

## 2024-03-26 PROCEDURE — 3077F SYST BP >= 140 MM HG: CPT | Performed by: PHYSICIAN ASSISTANT

## 2024-03-26 PROCEDURE — 99213 OFFICE O/P EST LOW 20 MIN: CPT | Performed by: PHYSICIAN ASSISTANT

## 2024-03-26 PROCEDURE — G8484 FLU IMMUNIZE NO ADMIN: HCPCS | Performed by: PHYSICIAN ASSISTANT

## 2024-03-26 PROCEDURE — G8427 DOCREV CUR MEDS BY ELIG CLIN: HCPCS | Performed by: PHYSICIAN ASSISTANT

## 2024-03-26 PROCEDURE — 1123F ACP DISCUSS/DSCN MKR DOCD: CPT | Performed by: PHYSICIAN ASSISTANT

## 2024-03-26 PROCEDURE — G8399 PT W/DXA RESULTS DOCUMENT: HCPCS | Performed by: PHYSICIAN ASSISTANT

## 2024-03-26 PROCEDURE — 1036F TOBACCO NON-USER: CPT | Performed by: PHYSICIAN ASSISTANT

## 2024-03-26 RX ORDER — CYCLOBENZAPRINE HCL 10 MG
10 TABLET ORAL DAILY
Qty: 30 TABLET | Refills: 2 | Status: SHIPPED | OUTPATIENT
Start: 2024-03-26

## 2024-03-26 RX ORDER — GABAPENTIN 300 MG/1
300 CAPSULE ORAL 3 TIMES DAILY
Qty: 90 CAPSULE | Refills: 2 | Status: SHIPPED | OUTPATIENT
Start: 2024-03-26 | End: 2024-06-24

## 2024-03-26 NOTE — PROGRESS NOTES
[facility]  [address] [facility] [address], [place] [address]  [phone]    Follow up Note      Marina Green     Date of Visit:  3/26/2024    CC:  Patient presents for follow up   Chief Complaint   Patient presents with    Lower Back Pain    Hip Pain       HPI:    Pain is worse.  Appropriate analgesia with current medications regimen: yes.    Change in quality of symptoms:no.    Medication side effects:none.   Recent diagnostic testing:none.   Recent interventional procedures:none..    She has been on anticoagulation medications to include Plavix/ASA. The patient  has not been on herbal supplements.  The patient is not diabetic.     Imaging:   Lumbar spine MRI 2021  . Advanced degenerative change with slight grade 1 retrolisthesis at L2-3   and slight grade 1 anterolisthesis at L4-5.  Mild levoscoliosis.   2. Moderate central canal stenosis at L3-4 with right-sided disc protrusion   causing mild right subarticular recess stenosis and moderate right neural   foraminal stenosis.   3. Mild to moderate central canal stenosis at L2-3 with small right-sided   disc herniation causing mild right subarticular recess and neural foraminal   stenosis.   4. Small left-sided disc protrusion at L4-5 causing mild left subarticular   recess stenosis and neural foraminal stenosis.  No significant central canal   stenosis at this level.   5. Subchondral signal change about L2-3 with some some edema most likely   related to severe degenerative disc disease.  Clinical correlation for signs   of infection is suggested.      Right hip Xray 2021:  Mild-to-moderate osteoarthritis at the right hip.     Lumbar spine Xray 2016:  1. Left convex scoliosis.   2. Multilevel degenerative spondylosis throughout the lumbar spine.   3. Facet arthritis from L2-S1.   4. No acute abnormality is identified.      Previous treatments: Physical Therapy and medications..                            Potential Aberrant

## 2024-04-02 ENCOUNTER — TELEPHONE (OUTPATIENT)
Dept: SURGERY | Age: 69
End: 2024-04-02

## 2024-04-02 DIAGNOSIS — I87.8 POOR VENOUS ACCESS: ICD-10-CM

## 2024-04-02 RX ORDER — OXYCODONE HYDROCHLORIDE 5 MG/1
5 CAPSULE ORAL EVERY 4 HOURS PRN
COMMUNITY

## 2024-04-02 RX ORDER — DEXAMETHASONE 4 MG/1
4 TABLET ORAL DAILY
COMMUNITY

## 2024-04-02 NOTE — TELEPHONE ENCOUNTER
Per the order of Dr. Gu, patient has been scheduled for Mediport insertion on 2024.  Patient provided with procedure information over the phone and informed that written information will be mailed to her.  Patient instructed to please contact our office with any questions.    Patient informed that she will need to hold her plavix 7 days prior to her procedure.  Procedure is scheduled at the [facility].    Procedure scheduled through [facility].  Dr. Gu to enter orders.        Prior Authorization Form:      DEMOGRAPHICS:                     Patient Name:  Marina Green  Patient :  1955            Insurance:  Payor: MEDICARE / Plan: MEDICARE PART A AND B / Product Type: *No Product type* /   Insurance ID Number:    Payer/Plan Subscr  Sex Relation Sub. Ins. ID Effective Group Num   1. MEDICARE - ME* MARINA GREEN 1955 Female Self [id number]                                    [address] [address]   2. [facility]* MARINA GREEN 1955 Female Self [id number] PLAN F                                   [address]         DIAGNOSIS & PROCEDURE:                       Procedure/Operation: Mediport insertion           CPT Code: 83525    Diagnosis:  Poor venous access    ICD10 Code: I87.8    Location:  [place]    Surgeon:  GREG    SCHEDULING INFORMATION:                          Date: 2024    Time: TBD              Anesthesia:  MAC/TIVA                                                       Status:  Outpatient        Special Comments:         Electronically signed by Casandra Cordero on 2024 at 11:58 AM

## 2024-04-08 ENCOUNTER — ANESTHESIA EVENT (OUTPATIENT)
Dept: OPERATING ROOM | Age: 69
End: 2024-04-08
Payer: MEDICARE

## 2024-04-08 ENCOUNTER — HOSPITAL ENCOUNTER (OUTPATIENT)
Dept: CT IMAGING | Age: 69
Discharge: HOME | End: 2024-04-08
Payer: MEDICARE

## 2024-04-08 DIAGNOSIS — R63.4 ABNORMAL WEIGHT LOSS: ICD-10-CM

## 2024-04-08 DIAGNOSIS — C25.3 MALIGNANT NEOPLASM OF PANCREATIC DUCT (HCC): ICD-10-CM

## 2024-04-08 DIAGNOSIS — K90.89 OTHER INTESTINAL MALABSORPTION: ICD-10-CM

## 2024-04-08 DIAGNOSIS — K86.81 EXOCRINE PANCREATIC INSUFFICIENCY: ICD-10-CM

## 2024-04-08 PROCEDURE — 71260 CT THORAX DX C+: CPT

## 2024-04-08 PROCEDURE — 6360000004 HC RX CONTRAST MEDICATION: Performed by: RADIOLOGY

## 2024-04-08 RX ADMIN — IOPAMIDOL 75 ML: 755 INJECTION, SOLUTION INTRAVENOUS at 13:54

## 2024-04-08 ASSESSMENT — LIFESTYLE VARIABLES: SMOKING_STATUS: 0

## 2024-04-08 NOTE — H&P
General Surgery History and Physical  [facility]    Patient's Name/Date of Birth: Marina Green / 1955    Date: April 9, 2024     Surgeon: Omega Gu MD    PCP: Marilynn Schafer, APRN - CNP     Chief Complaint: Poor venous access    HPI:   Marina Green is a 68 y.o. female who presents for evaluation of poor venous access. Denies SOB, chest pain, nausea, vomiting, fever, chills. Has had no vascular catheters in the past.    Patient Active Problem List   Diagnosis    Neuropathy    Mixed hyperlipidemia    Coronary artery disease involving native coronary artery of native heart without angina pectoris    Generalized osteoarthritis    Weight loss    Diarrhea    Hyperthyroidism    Primary osteoarthritis of left knee    Chronic bilateral low back pain without sciatica    Chronic pain syndrome    Primary osteoarthritis of right hip    Lumbar disc disorder    Lumbar spondylosis    Lumbar facet arthropathy    Lumbar radiculopathy    Chronic obstructive pulmonary disease, unspecified COPD type (HCC)    Stented coronary artery    Myocardial infarction (HCC)    Mitochondrial myopathy    Benign essential hypertension    Anxiety with depression    Hx of colonoscopy with polypectomy    Need for prophylactic vaccination against diphtheria and tetanus    Need for shingles vaccine    Vitamin D deficiency    Needs flu shot    Screening mammogram for breast cancer    Encounter for osteoporosis screening in asymptomatic postmenopausal patient    Opioid dependence with current use (HCC)    Lung cancer screening declined by patient    Poor venous access       Past Medical History:   Diagnosis Date    Anxiety     CAD (coronary artery disease)     Carotid artery stenosis     Chronic back pain     Chronic obstructive pulmonary disease, unspecified COPD type (HCC) 01/05/2022    COPD (chronic obstructive pulmonary disease) (HCC)     Depression     GERD (gastroesophageal reflux disease)     Hyperlipidemia

## 2024-04-08 NOTE — DISCHARGE INSTRUCTIONS
[facility]  Omega Gu MD    Discharge Date:  4/8/2024    Discharged To:  Home    RESUME ACTIVITY:      BATHING:  May shower 24hrs after surgery, remove dressings after 24hrs if in place, leave steristrips in place as they will fall of independently. You may have adhesive glue covering your incisions which will dissolve on it own.  May bathe or swim 5 days after surgery    DRIVING: No driving while on pain medications    RETURN TO WORK: [employer]    WALKING:  Yes, encouraged    SEXUAL ACTIVITY: Yes    STAIRS:  Yes    LIFTING: At [employer]    DIET: General adult    SPECIAL INSTRUCTIONS:     Call physician if they or any other problems occur:  Fever over 101°    Redness, swelling, hardness or warmth at the operative site  Unrelieved nausea    Foul smelling or cloudy drainage at the operative site   Unrelieved pain    Blood soaked dressing. (Some oozing may be normal)    Call office for follow up appointment with Dr Gu as needed. Call with questions regarding follow up.    Call the office at [phone] if you have a fever > 100 F, or if your incision becomes red, tender, or drains more than a small amount of clear fluid.    Keep incisions clean and dry.  Vicodin/Percocet and ibuprofen for pain as prescribed. Okay to resume anticoagulant medication after 24hrs.      Do not exceed 4000mg of Tylenol/Acetaminophen per day. Vicodin/Norco/Percocet contain acetaminophen. Do not take additional amounts of Tylenol if you are taking these medications.             Infection After Surgery: Care Instructions  Overview  After surgery, an infection is always possible. It doesn't mean that the surgery didn't go well.  Because an infection can be serious, your doctor has taken steps to manage it.  Your doctor checked the infection and cleaned it if necessary. Your doctor may have made an opening in the area so that the pus can drain out. You may have gauze

## 2024-04-08 NOTE — ANESTHESIA PRE PROCEDURE
Department of Anesthesiology  Preprocedure Note       Name:  Marina Green   Age:  68 y.o.  :  1955                                          MRN:  [id number]         Date:  2024      Surgeon: Surgeon(s):  Omega Gu MD    Procedure: Procedure(s):  MEDIPORT INSERTION    Medications prior to admission:   Prior to Admission medications    Medication Sig Start Date End Date Taking? Authorizing Provider   dexAMETHasone (DECADRON) 4 MG tablet Take 1 tablet by mouth daily   Yes ProviderKush MD   oxyCODONE 5 MG capsule Take 1 capsule by mouth every 4 hours as needed for Pain. Max Daily Amount: 30 mg   Yes ProviderKush MD   gabapentin (NEURONTIN) 300 MG capsule Take 1 capsule by mouth 3 times daily for 90 days. 3/26/24 6/24/24  Vianey Aleman PA   cyclobenzaprine (FLEXERIL) 10 MG tablet Take 1 tablet by mouth daily 3/26/24   Vianey Aleman PA   methIMAzole (TAPAZOLE) 5 MG tablet Take 0.5 tablet daily 23   Cornelius Salgado APRN - CNS   atorvastatin (LIPITOR) 10 MG tablet Take 1 tablet by mouth daily 10/25/23   Marilynn Schafer APRN - CNP   clopidogrel (PLAVIX) 75 MG tablet Take 1 tablet by mouth daily 10/25/23   Marilynn Schafer APRN - CNP   DULoxetine (CYMBALTA) 60 MG extended release capsule Take 1 capsule by mouth daily 10/25/23   Marilynn Schafer APRN - CNP   metoprolol succinate (TOPROL XL) 50 MG extended release tablet Take 1 tablet by mouth in the morning and 1 tablet in the evening.  Patient taking differently: Take 1 tablet by mouth in the morning and 1 tablet in the evening. 24 Pt instructed to take AM of 24 surgery.. 10/25/23   Marilynn Schafer APRN - CNP   zoster recombinant adjuvanted vaccine (SHINGRIX) 50 MCG/0.5ML SUSR injection Inject 0.5 mLs into the muscle See Admin Instructions 1 dose now and repeat in 2-6 months 10/23/23 4/20/24  Marilynn Schafer APRN - CNP   lidocaine (LIDODERM) 5 % APPLY 1 PATCH TO SKIN DAILY, 12 HOURS ON AND 12 HOURS OFF

## 2024-04-09 ENCOUNTER — HOSPITAL ENCOUNTER (OUTPATIENT)
Dept: OPERATING ROOM | Age: 69
Setting detail: OUTPATIENT SURGERY
Discharge: HOME | End: 2024-04-09
Attending: SURGERY
Payer: MEDICARE

## 2024-04-09 ENCOUNTER — TELEPHONE (OUTPATIENT)
Dept: FAMILY MEDICINE CLINIC | Age: 69
End: 2024-04-09

## 2024-04-09 ENCOUNTER — HOSPITAL ENCOUNTER (OUTPATIENT)
Age: 69
Setting detail: OUTPATIENT SURGERY
Discharge: HOME | End: 2024-04-09
Attending: SURGERY | Admitting: SURGERY
Payer: MEDICARE

## 2024-04-09 ENCOUNTER — ANESTHESIA (OUTPATIENT)
Dept: OPERATING ROOM | Age: 69
End: 2024-04-09
Payer: MEDICARE

## 2024-04-09 VITALS
SYSTOLIC BLOOD PRESSURE: 134 MMHG | BODY MASS INDEX: 19.46 KG/M2 | DIASTOLIC BLOOD PRESSURE: 71 MMHG | WEIGHT: 139 LBS | HEIGHT: 71 IN | OXYGEN SATURATION: 95 % | TEMPERATURE: 97 F | RESPIRATION RATE: 16 BRPM | HEART RATE: 83 BPM

## 2024-04-09 DIAGNOSIS — I87.8 POOR VENOUS ACCESS: ICD-10-CM

## 2024-04-09 PROCEDURE — 7100000011 HC PHASE II RECOVERY - ADDTL 15 MIN: Performed by: SURGERY

## 2024-04-09 PROCEDURE — C1788 PORT, INDWELLING, IMP: HCPCS | Performed by: SURGERY

## 2024-04-09 PROCEDURE — 3600000012 HC SURGERY LEVEL 2 ADDTL 15MIN: Performed by: SURGERY

## 2024-04-09 PROCEDURE — 6360000002 HC RX W HCPCS: Performed by: NURSE ANESTHETIST, CERTIFIED REGISTERED

## 2024-04-09 PROCEDURE — 71045 X-RAY EXAM CHEST 1 VIEW: CPT

## 2024-04-09 PROCEDURE — 2580000003 HC RX 258: Performed by: SURGERY

## 2024-04-09 PROCEDURE — 3700000001 HC ADD 15 MINUTES (ANESTHESIA): Performed by: SURGERY

## 2024-04-09 PROCEDURE — 2500000003 HC RX 250 WO HCPCS: Performed by: NURSE ANESTHETIST, CERTIFIED REGISTERED

## 2024-04-09 PROCEDURE — 99203 OFFICE O/P NEW LOW 30 MIN: CPT | Performed by: SURGERY

## 2024-04-09 PROCEDURE — 2709999900 HC NON-CHARGEABLE SUPPLY: Performed by: SURGERY

## 2024-04-09 PROCEDURE — 77001 FLUOROGUIDE FOR VEIN DEVICE: CPT | Performed by: SURGERY

## 2024-04-09 PROCEDURE — 3700000000 HC ANESTHESIA ATTENDED CARE: Performed by: SURGERY

## 2024-04-09 PROCEDURE — 2580000003 HC RX 258: Performed by: ANESTHESIOLOGY

## 2024-04-09 PROCEDURE — 7100000010 HC PHASE II RECOVERY - FIRST 15 MIN: Performed by: SURGERY

## 2024-04-09 PROCEDURE — 2500000003 HC RX 250 WO HCPCS: Performed by: SURGERY

## 2024-04-09 PROCEDURE — 36561 INSERT TUNNELED CV CATH: CPT | Performed by: SURGERY

## 2024-04-09 PROCEDURE — 3600000002 HC SURGERY LEVEL 2 BASE: Performed by: SURGERY

## 2024-04-09 PROCEDURE — 6360000002 HC RX W HCPCS: Performed by: SURGERY

## 2024-04-09 DEVICE — PORT SMARTPORT 8FR CT TI W/VLV SHTH: Type: IMPLANTABLE DEVICE | Site: CHEST  WALL | Status: FUNCTIONAL

## 2024-04-09 RX ORDER — HEPARIN 100 UNIT/ML
SYRINGE INTRAVENOUS
Status: DISCONTINUED
Start: 2024-04-09 | End: 2024-04-09 | Stop reason: HOSPADM

## 2024-04-09 RX ORDER — SODIUM CHLORIDE, SODIUM LACTATE, POTASSIUM CHLORIDE, CALCIUM CHLORIDE 600; 310; 30; 20 MG/100ML; MG/100ML; MG/100ML; MG/100ML
INJECTION, SOLUTION INTRAVENOUS CONTINUOUS
Status: DISCONTINUED | OUTPATIENT
Start: 2024-04-09 | End: 2024-04-09 | Stop reason: HOSPADM

## 2024-04-09 RX ORDER — LIDOCAINE HYDROCHLORIDE 20 MG/ML
INJECTION, SOLUTION INTRAVENOUS PRN
Status: DISCONTINUED | OUTPATIENT
Start: 2024-04-09 | End: 2024-04-09 | Stop reason: SDUPTHER

## 2024-04-09 RX ORDER — MIDAZOLAM HYDROCHLORIDE 1 MG/ML
INJECTION INTRAMUSCULAR; INTRAVENOUS PRN
Status: DISCONTINUED | OUTPATIENT
Start: 2024-04-09 | End: 2024-04-09 | Stop reason: SDUPTHER

## 2024-04-09 RX ORDER — HEPARIN 100 UNIT/ML
SYRINGE INTRAVENOUS PRN
Status: DISCONTINUED | OUTPATIENT
Start: 2024-04-09 | End: 2024-04-09 | Stop reason: ALTCHOICE

## 2024-04-09 RX ORDER — DIPHENHYDRAMINE HYDROCHLORIDE 50 MG/ML
INJECTION INTRAMUSCULAR; INTRAVENOUS PRN
Status: DISCONTINUED | OUTPATIENT
Start: 2024-04-09 | End: 2024-04-09 | Stop reason: SDUPTHER

## 2024-04-09 RX ORDER — SODIUM CHLORIDE 0.9 % (FLUSH) 0.9 %
5-40 SYRINGE (ML) INJECTION PRN
Status: DISCONTINUED | OUTPATIENT
Start: 2024-04-09 | End: 2024-04-09 | Stop reason: HOSPADM

## 2024-04-09 RX ORDER — SODIUM CHLORIDE 9 MG/ML
INJECTION, SOLUTION INTRAVENOUS PRN
Status: DISCONTINUED | OUTPATIENT
Start: 2024-04-09 | End: 2024-04-09 | Stop reason: HOSPADM

## 2024-04-09 RX ORDER — ONDANSETRON 2 MG/ML
INJECTION INTRAMUSCULAR; INTRAVENOUS PRN
Status: DISCONTINUED | OUTPATIENT
Start: 2024-04-09 | End: 2024-04-09 | Stop reason: SDUPTHER

## 2024-04-09 RX ORDER — FENTANYL CITRATE 50 UG/ML
INJECTION, SOLUTION INTRAMUSCULAR; INTRAVENOUS PRN
Status: DISCONTINUED | OUTPATIENT
Start: 2024-04-09 | End: 2024-04-09 | Stop reason: SDUPTHER

## 2024-04-09 RX ORDER — KETAMINE HCL IN NACL, ISO-OSM 100MG/10ML
SYRINGE (ML) INJECTION PRN
Status: DISCONTINUED | OUTPATIENT
Start: 2024-04-09 | End: 2024-04-09 | Stop reason: SDUPTHER

## 2024-04-09 RX ORDER — SODIUM CHLORIDE 0.9 % (FLUSH) 0.9 %
5-40 SYRINGE (ML) INJECTION EVERY 12 HOURS SCHEDULED
Status: DISCONTINUED | OUTPATIENT
Start: 2024-04-09 | End: 2024-04-09 | Stop reason: HOSPADM

## 2024-04-09 RX ORDER — PROPOFOL 10 MG/ML
INJECTION, EMULSION INTRAVENOUS CONTINUOUS PRN
Status: DISCONTINUED | OUTPATIENT
Start: 2024-04-09 | End: 2024-04-09 | Stop reason: SDUPTHER

## 2024-04-09 RX ADMIN — Medication 10 MG: at 12:18

## 2024-04-09 RX ADMIN — MIDAZOLAM 2 MG: 1 INJECTION INTRAMUSCULAR; INTRAVENOUS at 12:12

## 2024-04-09 RX ADMIN — SODIUM CHLORIDE, POTASSIUM CHLORIDE, SODIUM LACTATE AND CALCIUM CHLORIDE: 600; 310; 30; 20 INJECTION, SOLUTION INTRAVENOUS at 11:47

## 2024-04-09 RX ADMIN — LIDOCAINE HYDROCHLORIDE 40 MG: 20 INJECTION, SOLUTION INTRAVENOUS at 12:16

## 2024-04-09 RX ADMIN — DIPHENHYDRAMINE HYDROCHLORIDE 12.5 MG: 50 INJECTION, SOLUTION INTRAMUSCULAR; INTRAVENOUS at 12:16

## 2024-04-09 RX ADMIN — CEFAZOLIN 2000 MG: 2 INJECTION, POWDER, FOR SOLUTION INTRAMUSCULAR; INTRAVENOUS at 12:18

## 2024-04-09 RX ADMIN — FENTANYL CITRATE 25 MCG: 50 INJECTION, SOLUTION INTRAMUSCULAR; INTRAVENOUS at 12:17

## 2024-04-09 RX ADMIN — PROPOFOL 100 MCG/KG/MIN: 10 INJECTION, EMULSION INTRAVENOUS at 12:16

## 2024-04-09 RX ADMIN — ONDANSETRON 4 MG: 2 INJECTION INTRAMUSCULAR; INTRAVENOUS at 12:16

## 2024-04-09 ASSESSMENT — PAIN - FUNCTIONAL ASSESSMENT
PAIN_FUNCTIONAL_ASSESSMENT: NONE - DENIES PAIN
PAIN_FUNCTIONAL_ASSESSMENT: NONE - DENIES PAIN
PAIN_FUNCTIONAL_ASSESSMENT: 0-10
PAIN_FUNCTIONAL_ASSESSMENT: NONE - DENIES PAIN
PAIN_FUNCTIONAL_ASSESSMENT: NONE - DENIES PAIN

## 2024-04-09 NOTE — TELEPHONE ENCOUNTER
Patient had port put in today so they with held the plavix 7days and then to take it next 2 days and then hold it for another 5 day for needle biopsey on her liver.  Patient was told to let you know about this.     Last seen 1/8/2024  Next appt 4/25/2024

## 2024-04-09 NOTE — OP NOTE
Operative Note      Patient: Marina Green  YOB: 1955  MRN: [id number]    Date of Procedure: 4/9/2024    Pre-Op Diagnosis Codes:     * Poor venous access [I87.8]    Post-Op Diagnosis: Same       Procedure(s):  MEDIPORT INSERTION    Surgeon(s):  Omega Gu MD    Assistant:   First Assistant: Shayna Cummings    Anesthesia: Monitor Anesthesia Care    Estimated Blood Loss (mL): less than 50     Complications: None    Specimens:   * No specimens in log *    Implants:  Implant Name Type Inv. Item Serial No.  Lot No. LRB No. Used Action   PORT SMARTPORT 8FR CT TI W/VLV [facility] - [id number] Port PORT SMARTPORT 8FR CT TI W/VLV [facility]  [employer] [facility] [id number] N/A 1 Implanted         Drains: * No LDAs found *    Findings:  Infection Present At Time Of Surgery (PATOS) (choose all levels that have infection present):  No infection present  Other Findings: see below    Detailed Description of Procedure:     DESCRIPTION OF PROCEDURE: The patient was identified and the procedure was confirmed. The left neck and chest were prepped and draped in the usual sterile fashion. Next, local anesthesic was used for local anesthesia. The left subclavian vein was percutaneously entered and a guidewire was advanced into the superior vena cava under fluoroscopic guidance. A skin incision was made below the clavicle to include the wire and a pocket was made in the subcutaneous tissue for the reservoir.  The catheter was pulled through a subcutaneous tunnel from the inferior chest incision to the access incision. The introducer was passed over the wire then the catheter was passed through the introducer and the tip was positioned in the superior vena cava right atrial junction under fluoroscopic guidance. The catheter was cut to size and connected to the reservoir and the locking hub was engaged.  The port was noted to withdrawal and flush blood easily and was flushed with heparinized saline solution. The

## 2024-04-09 NOTE — PROGRESS NOTES
Dr Gu sees chest x ray result and states pt may be discharged. Pt without complaints,or distress noted.

## 2024-04-09 NOTE — ANESTHESIA POSTPROCEDURE EVALUATION
Department of Anesthesiology  Postprocedure Note    Patient: Marina Green  MRN: [id number]  YOB: 1955  Date of evaluation: 4/9/2024    Procedure Summary       Date: 04/09/24 Room / Location: [address]    Anesthesia Start: 1211 Anesthesia Stop: 1242    Procedure: MEDIPORT INSERTION Diagnosis:       Poor venous access      (Poor venous access [I87.8])    Surgeons: Omega Gu MD Responsible Provider: Chase Richards MD    Anesthesia Type: MAC ASA Status: 3            Anesthesia Type: MAC    Javier Phase I: Javier Score: 10    Javier Phase II: Javier Score: 10    Anesthesia Post Evaluation    Patient location during evaluation: PACU  Patient participation: complete - patient participated  Level of consciousness: awake  Airway patency: patent  Nausea & Vomiting: no nausea and no vomiting  Cardiovascular status: hemodynamically stable  Respiratory status: acceptable  Hydration status: stable  Pain management: adequate    No notable events documented.

## 2024-04-16 ENCOUNTER — HOSPITAL ENCOUNTER (OUTPATIENT)
Dept: CT IMAGING | Age: 69
Discharge: HOME | End: 2024-04-18
Payer: MEDICARE

## 2024-04-16 VITALS
DIASTOLIC BLOOD PRESSURE: 84 MMHG | HEIGHT: 71 IN | RESPIRATION RATE: 18 BRPM | BODY MASS INDEX: 19.46 KG/M2 | TEMPERATURE: 98 F | HEART RATE: 85 BPM | WEIGHT: 139 LBS | SYSTOLIC BLOOD PRESSURE: 175 MMHG | OXYGEN SATURATION: 96 %

## 2024-04-16 DIAGNOSIS — R63.4 ABNORMAL WEIGHT LOSS: ICD-10-CM

## 2024-04-16 DIAGNOSIS — K86.81 EXOCRINE PANCREATIC INSUFFICIENCY: ICD-10-CM

## 2024-04-16 DIAGNOSIS — C25.3 MALIGNANT NEOPLASM OF PANCREATIC DUCT (HCC): ICD-10-CM

## 2024-04-16 DIAGNOSIS — K90.89 OTHER SPECIFIED INTESTINAL MALABSORPTION: ICD-10-CM

## 2024-04-16 LAB
ANION GAP SERPL CALCULATED.3IONS-SCNC: 8 MMOL/L (ref 7–16)
BASOPHILS # BLD: 0.01 K/UL (ref 0–0.2)
BASOPHILS NFR BLD: 0 % (ref 0–2)
BUN SERPL-MCNC: 13 MG/DL (ref 6–23)
CALCIUM SERPL-MCNC: 9.5 MG/DL (ref 8.6–10.2)
CHLORIDE SERPL-SCNC: 99 MMOL/L (ref 98–107)
CO2 SERPL-SCNC: 29 MMOL/L (ref 22–29)
CREAT SERPL-MCNC: 0.6 MG/DL (ref 0.5–1)
EOSINOPHIL # BLD: 0.05 K/UL (ref 0.05–0.5)
EOSINOPHILS RELATIVE PERCENT: 0 % (ref 0–6)
ERYTHROCYTE [DISTWIDTH] IN BLOOD BY AUTOMATED COUNT: 13.7 % (ref 11.5–15)
GFR SERPL CREATININE-BSD FRML MDRD: >90 ML/MIN/1.73M2
GLUCOSE SERPL-MCNC: 147 MG/DL (ref 74–99)
HCT VFR BLD AUTO: 43.9 % (ref 34–48)
HGB BLD-MCNC: 14.6 G/DL (ref 11.5–15.5)
IMM GRANULOCYTES # BLD AUTO: 0.04 K/UL (ref 0–0.58)
IMM GRANULOCYTES NFR BLD: 0 % (ref 0–5)
INR PPP: 1.1
LYMPHOCYTES NFR BLD: 1.17 K/UL (ref 1.5–4)
LYMPHOCYTES RELATIVE PERCENT: 10 % (ref 20–42)
MCH RBC QN AUTO: 30.6 PG (ref 26–35)
MCHC RBC AUTO-ENTMCNC: 33.3 G/DL (ref 32–34.5)
MCV RBC AUTO: 92 FL (ref 80–99.9)
MONOCYTES NFR BLD: 0.72 K/UL (ref 0.1–0.95)
MONOCYTES NFR BLD: 6 % (ref 2–12)
NEUTROPHILS NFR BLD: 82 % (ref 43–80)
NEUTS SEG NFR BLD: 9.3 K/UL (ref 1.8–7.3)
PLATELET # BLD AUTO: 195 K/UL (ref 130–450)
PMV BLD AUTO: 9.7 FL (ref 7–12)
POTASSIUM SERPL-SCNC: 3.9 MMOL/L (ref 3.5–5)
PROTHROMBIN TIME: 11.5 SEC (ref 9.3–12.4)
RBC # BLD AUTO: 4.77 M/UL (ref 3.5–5.5)
SODIUM SERPL-SCNC: 136 MMOL/L (ref 132–146)
WBC OTHER # BLD: 11.3 K/UL (ref 4.5–11.5)

## 2024-04-16 PROCEDURE — 85610 PROTHROMBIN TIME: CPT

## 2024-04-16 PROCEDURE — 2709999900 CT NEEDLE BIOPSY LIVER PERCUTANEOUS

## 2024-04-16 PROCEDURE — 6360000002 HC RX W HCPCS: Performed by: RADIOLOGY

## 2024-04-16 PROCEDURE — 85025 COMPLETE CBC W/AUTO DIFF WBC: CPT

## 2024-04-16 PROCEDURE — 7100000010 HC PHASE II RECOVERY - FIRST 15 MIN: Performed by: RADIOLOGY

## 2024-04-16 PROCEDURE — 77012 CT SCAN FOR NEEDLE BIOPSY: CPT

## 2024-04-16 PROCEDURE — 80048 BASIC METABOLIC PNL TOTAL CA: CPT

## 2024-04-16 PROCEDURE — 7100000011 HC PHASE II RECOVERY - ADDTL 15 MIN: Performed by: RADIOLOGY

## 2024-04-16 PROCEDURE — 36415 COLL VENOUS BLD VENIPUNCTURE: CPT | Performed by: RADIOLOGY

## 2024-04-16 PROCEDURE — 2500000003 HC RX 250 WO HCPCS: Performed by: RADIOLOGY

## 2024-04-16 RX ORDER — HYDROMORPHONE HYDROCHLORIDE 1 MG/ML
1 INJECTION, SOLUTION INTRAMUSCULAR; INTRAVENOUS; SUBCUTANEOUS ONCE
Status: COMPLETED | OUTPATIENT
Start: 2024-04-16 | End: 2024-04-16

## 2024-04-16 RX ORDER — MIDAZOLAM HYDROCHLORIDE 2 MG/2ML
INJECTION, SOLUTION INTRAMUSCULAR; INTRAVENOUS PRN
Status: COMPLETED | OUTPATIENT
Start: 2024-04-16 | End: 2024-04-16

## 2024-04-16 RX ORDER — FENTANYL CITRATE 50 UG/ML
INJECTION, SOLUTION INTRAMUSCULAR; INTRAVENOUS PRN
Status: COMPLETED | OUTPATIENT
Start: 2024-04-16 | End: 2024-04-16

## 2024-04-16 RX ORDER — LIDOCAINE HYDROCHLORIDE 20 MG/ML
INJECTION, SOLUTION INFILTRATION; PERINEURAL PRN
Status: COMPLETED | OUTPATIENT
Start: 2024-04-16 | End: 2024-04-16

## 2024-04-16 RX ORDER — LIDOCAINE HYDROCHLORIDE AND EPINEPHRINE BITARTRATE 20; .01 MG/ML; MG/ML
INJECTION, SOLUTION SUBCUTANEOUS PRN
Status: COMPLETED | OUTPATIENT
Start: 2024-04-16 | End: 2024-04-16

## 2024-04-16 RX ADMIN — LIDOCAINE HYDROCHLORIDE,EPINEPHRINE BITARTRATE 10 ML: 20; .01 INJECTION, SOLUTION INFILTRATION; PERINEURAL at 09:27

## 2024-04-16 RX ADMIN — FENTANYL CITRATE 50 MCG: 50 INJECTION, SOLUTION INTRAMUSCULAR; INTRAVENOUS at 09:22

## 2024-04-16 RX ADMIN — HYDROMORPHONE HYDROCHLORIDE 1 MG: 1 INJECTION, SOLUTION INTRAMUSCULAR; INTRAVENOUS; SUBCUTANEOUS at 10:02

## 2024-04-16 RX ADMIN — THROMBIN HUMAN 1 KIT: 2000 POWDER, FOR SOLUTION TOPICAL at 09:33

## 2024-04-16 RX ADMIN — MIDAZOLAM HYDROCHLORIDE 1 MG: 1 INJECTION, SOLUTION INTRAMUSCULAR; INTRAVENOUS at 09:21

## 2024-04-16 RX ADMIN — LIDOCAINE HYDROCHLORIDE 10 ML: 20 INJECTION, SOLUTION INFILTRATION; PERINEURAL at 09:27

## 2024-04-16 ASSESSMENT — PAIN - FUNCTIONAL ASSESSMENT: PAIN_FUNCTIONAL_ASSESSMENT: NONE - DENIES PAIN

## 2024-04-16 ASSESSMENT — PAIN SCALES - GENERAL
PAINLEVEL_OUTOF10: 10
PAINLEVEL_OUTOF10: 7

## 2024-04-16 NOTE — PROCEDURES
0800 Patient arrived to Radiology department for liver biopsy. Vital signs taken. IV placed, blood obtained, IV flushed and prn adapter attached. Blood sample sent to lab for ordered tests. Allergies, home medications, medical history, H&P and fasting instructions reviewed with patient. Paper chart reviewed for correct documents. Procedural instructions given, questions answered, understanding expressed and consent signed.

## 2024-04-16 NOTE — BRIEF OP NOTE
Brief Postoperative Note  ______________________________________________________________       [facility]IRA CT SCAN    Patient Name: Marina Green   YOB: 1955  Medical Record Number: [id number]  Date of Procedure: 4/16/24  Room/Bed: Room/bed info not found    Pre-operative Diagnosis and Procedure: Liver Bx    Post-operative Diagnosis: Same    Consent: INFORMED CONSENT WAS OBTAINED BY patient, RISK AND BENEFITS WERE DISCUSSED.     Anesthesia: Local anesthesia with approximately 10 mL of 1% Lidocaine with epinephrine administered subcutaneously administered subcutaneously. intravenous sedation.    Estimated Blood Loss: < 10 cc    Performed by: Timothy Bradford MD.    Complications: none    Specimen obtained: 18g cores    Findings: none    Electronically signed by Timothy Bradford MD   DD: 4/16/24  2:37 PM

## 2024-04-16 NOTE — PROCEDURES
0940  Patient returned from liver biopsy. Report received from CLAUDINE Van. Dressing checked, clean, dry, and intact. Patient stable. No s/s of complications noted or reported. Vitals will be checked q 15min, see flow sheets. Last medications given at 0922. Patient will recover for at least 60 minutes.     1000 Patient eating and drinking well with no s/s of complications noted or reported.    1040 Site was checked with every set of vitals. Site clean dry and intact. Discharge papers reviewed with patient, questions answered, discharge paper signed. Patient ambulated to door with her spouse. Patient in stable condition, no s/s of complications noted or reported.

## 2024-04-16 NOTE — PRE SEDATION
PRE-SEDATION ASSESSMENT NOTE    Patient Name: Marina Green   Medical Record Number: [id number]  Date: 4/16/24   Time: 2:36 PM EDT   Room/Bed: Room/bed info not found  Admitting Diagnosis: <principal problem not specified>    1. HISTORY & PHYSICAL EXAMINATION:  Comments: none    Vitals:    04/16/24 1030   BP: (!) 175/84   Pulse: 85   Resp: 18   Temp:    SpO2: 96%       Allergies: Iodine and Evista [raloxifene hcl]    2. Heart and Lungs immediately prior to procedure demonstrate no contraindications to proceed    Drug: unknown  Tobacco: unknown    3. PAST ANESTHESIA EXPERIENCE:  No previous History.     4. AIRWAY/TEETH/HEAD & NECK(Mallampati Classification):  II (soft palate, uvula, fauces visible)    5: NORMAL RANGE OF MOTION OF NECK: No    6. PATIENT WILL LIKELY TOLERATE PLAN OF MODERATE SEDATION    7. ASA 2.    Electronically signed by Timothy Bradford MD

## 2024-04-22 LAB — SURGICAL PATHOLOGY REPORT: NORMAL

## 2024-04-25 ENCOUNTER — OFFICE VISIT (OUTPATIENT)
Dept: FAMILY MEDICINE CLINIC | Age: 69
End: 2024-04-25
Payer: MEDICARE

## 2024-04-25 VITALS
OXYGEN SATURATION: 98 % | DIASTOLIC BLOOD PRESSURE: 70 MMHG | HEART RATE: 96 BPM | BODY MASS INDEX: 18.58 KG/M2 | HEIGHT: 71 IN | TEMPERATURE: 97.5 F | SYSTOLIC BLOOD PRESSURE: 130 MMHG | WEIGHT: 132.7 LBS | RESPIRATION RATE: 22 BRPM

## 2024-04-25 DIAGNOSIS — F41.8 ANXIETY WITH DEPRESSION: ICD-10-CM

## 2024-04-25 DIAGNOSIS — I10 BENIGN ESSENTIAL HYPERTENSION: Primary | ICD-10-CM

## 2024-04-25 DIAGNOSIS — J44.9 CHRONIC OBSTRUCTIVE PULMONARY DISEASE, UNSPECIFIED COPD TYPE (HCC): ICD-10-CM

## 2024-04-25 DIAGNOSIS — Z95.5 STENTED CORONARY ARTERY: ICD-10-CM

## 2024-04-25 DIAGNOSIS — E78.2 MIXED HYPERLIPIDEMIA: ICD-10-CM

## 2024-04-25 DIAGNOSIS — C25.9 PANCREATIC CANCER METASTASIZED TO LIVER (HCC): ICD-10-CM

## 2024-04-25 DIAGNOSIS — C78.7 PANCREATIC CANCER METASTASIZED TO LIVER (HCC): ICD-10-CM

## 2024-04-25 PROBLEM — Z23 NEED FOR PROPHYLACTIC VACCINATION AGAINST DIPHTHERIA AND TETANUS: Status: RESOLVED | Noted: 2022-06-16 | Resolved: 2024-04-25

## 2024-04-25 PROBLEM — Z23 NEEDS FLU SHOT: Status: RESOLVED | Noted: 2022-11-16 | Resolved: 2024-04-25

## 2024-04-25 PROBLEM — Z23 NEED FOR SHINGLES VACCINE: Status: RESOLVED | Noted: 2022-06-16 | Resolved: 2024-04-25

## 2024-04-25 PROBLEM — I21.9 MYOCARDIAL INFARCTION (HCC): Status: RESOLVED | Noted: 2022-03-21 | Resolved: 2024-04-25

## 2024-04-25 PROCEDURE — 3023F SPIROM DOC REV: CPT | Performed by: NURSE PRACTITIONER

## 2024-04-25 PROCEDURE — 1036F TOBACCO NON-USER: CPT | Performed by: NURSE PRACTITIONER

## 2024-04-25 PROCEDURE — G8427 DOCREV CUR MEDS BY ELIG CLIN: HCPCS | Performed by: NURSE PRACTITIONER

## 2024-04-25 PROCEDURE — 3075F SYST BP GE 130 - 139MM HG: CPT | Performed by: NURSE PRACTITIONER

## 2024-04-25 PROCEDURE — 1123F ACP DISCUSS/DSCN MKR DOCD: CPT | Performed by: NURSE PRACTITIONER

## 2024-04-25 PROCEDURE — 3017F COLORECTAL CA SCREEN DOC REV: CPT | Performed by: NURSE PRACTITIONER

## 2024-04-25 PROCEDURE — G8399 PT W/DXA RESULTS DOCUMENT: HCPCS | Performed by: NURSE PRACTITIONER

## 2024-04-25 PROCEDURE — G8420 CALC BMI NORM PARAMETERS: HCPCS | Performed by: NURSE PRACTITIONER

## 2024-04-25 PROCEDURE — 1090F PRES/ABSN URINE INCON ASSESS: CPT | Performed by: NURSE PRACTITIONER

## 2024-04-25 PROCEDURE — 99214 OFFICE O/P EST MOD 30 MIN: CPT | Performed by: NURSE PRACTITIONER

## 2024-04-25 PROCEDURE — 3078F DIAST BP <80 MM HG: CPT | Performed by: NURSE PRACTITIONER

## 2024-04-25 RX ORDER — DULOXETIN HYDROCHLORIDE 60 MG/1
60 CAPSULE, DELAYED RELEASE ORAL DAILY
Qty: 90 CAPSULE | Refills: 1 | Status: SHIPPED | OUTPATIENT
Start: 2024-04-25

## 2024-04-25 RX ORDER — METOPROLOL SUCCINATE 50 MG/1
50 TABLET, EXTENDED RELEASE ORAL
Qty: 180 TABLET | Refills: 1 | Status: SHIPPED | OUTPATIENT
Start: 2024-04-25

## 2024-04-25 RX ORDER — ATORVASTATIN CALCIUM 10 MG/1
10 TABLET, FILM COATED ORAL DAILY
Qty: 90 TABLET | Refills: 1 | Status: SHIPPED | OUTPATIENT
Start: 2024-04-25

## 2024-04-25 RX ORDER — CLOPIDOGREL BISULFATE 75 MG/1
75 TABLET ORAL DAILY
Qty: 90 TABLET | Refills: 1 | Status: SHIPPED | OUTPATIENT
Start: 2024-04-25

## 2024-04-25 RX ORDER — FENTANYL 12.5 UG/1
1 PATCH TRANSDERMAL
COMMUNITY
Start: 2024-04-18

## 2024-04-25 ASSESSMENT — ENCOUNTER SYMPTOMS
SHORTNESS OF BREATH: 0
WHEEZING: 0
ABDOMINAL PAIN: 0
TROUBLE SWALLOWING: 0
SORE THROAT: 0
DIARRHEA: 1
FACIAL SWELLING: 0
BACK PAIN: 0
VOMITING: 0
COLOR CHANGE: 0
SINUS PAIN: 0
CONSTIPATION: 0
SINUS PRESSURE: 0
NAUSEA: 0
CHEST TIGHTNESS: 0
COUGH: 0
RHINORRHEA: 0
VOICE CHANGE: 0

## 2024-04-25 ASSESSMENT — PATIENT HEALTH QUESTIONNAIRE - PHQ9
5. POOR APPETITE OR OVEREATING: NEARLY EVERY DAY
2. FEELING DOWN, DEPRESSED OR HOPELESS: SEVERAL DAYS
4. FEELING TIRED OR HAVING LITTLE ENERGY: NEARLY EVERY DAY
8. MOVING OR SPEAKING SO SLOWLY THAT OTHER PEOPLE COULD HAVE NOTICED. OR THE OPPOSITE, BEING SO FIGETY OR RESTLESS THAT YOU HAVE BEEN MOVING AROUND A LOT MORE THAN USUAL: NEARLY EVERY DAY
1. LITTLE INTEREST OR PLEASURE IN DOING THINGS: NEARLY EVERY DAY
SUM OF ALL RESPONSES TO PHQ QUESTIONS 1-9: 17
10. IF YOU CHECKED OFF ANY PROBLEMS, HOW DIFFICULT HAVE THESE PROBLEMS MADE IT FOR YOU TO DO YOUR WORK, TAKE CARE OF THINGS AT HOME, OR GET ALONG WITH OTHER PEOPLE: SOMEWHAT DIFFICULT
SUM OF ALL RESPONSES TO PHQ QUESTIONS 1-9: 17
SUM OF ALL RESPONSES TO PHQ QUESTIONS 1-9: 17
SUM OF ALL RESPONSES TO PHQ9 QUESTIONS 1 & 2: 4
6. FEELING BAD ABOUT YOURSELF - OR THAT YOU ARE A FAILURE OR HAVE LET YOURSELF OR YOUR FAMILY DOWN: NOT AT ALL
3. TROUBLE FALLING OR STAYING ASLEEP: SEVERAL DAYS
7. TROUBLE CONCENTRATING ON THINGS, SUCH AS READING THE NEWSPAPER OR WATCHING TELEVISION: NEARLY EVERY DAY
SUM OF ALL RESPONSES TO PHQ QUESTIONS 1-9: 17
9. THOUGHTS THAT YOU WOULD BE BETTER OFF DEAD, OR OF HURTING YOURSELF: NOT AT ALL

## 2024-04-25 NOTE — ASSESSMENT & PLAN NOTE
Well-controlled, lifestyle modifications recommended, CT lung was negative for cancer, Central cylindrical bronchiectasis

## 2024-04-25 NOTE — ASSESSMENT & PLAN NOTE
well controlled and no significant medication side effects noted, Continue atorvastatin 10 mg nightly, labs reviewed Lipid, BMP, CBCD,   -Discussed low fat diet, limit fast food, goodies, breads and pastas if consuming several days a week,  limit alcohol consumption as this combined with statin can cause liver problems.  -Discussed weight reduction and exercise 30 minutes 5 days a week for total of 150 minutes weekly.  -Discussed CoQ10 as directed  -Discussed if any unusual muscle aching/pain to contact the office, discussed medication and risk of muscle pain/damage from Rhabdomyolysis.

## 2024-04-25 NOTE — ASSESSMENT & PLAN NOTE
Newly diagnosed following with [facility] and will start chemo on Tuesday. Liver biopsy done but no results to review.   FINDINGS:  There is no evidence of pulmonary nodule or pleural effusion at the level of  the lung bases.     There is a new subcapsular anterior left hepatic lobe lesion which measures  4.3 x 3.1 cm and between 25 and 30 Hounsfield units.  This does not meet the  CT criteria for a simple cyst.  The gallbladder is surgically absent and  prominence of the extrahepatic and central intrahepatic biliary tree likely  represents post cholecystectomy change.  The spleen, adrenals and kidneys  have an unremarkable appearance.  There is a 2.5 x 2.8 x 2.0 cm mass in the  pancreatic body at axial images 34 through with dilation of the pancreatic  body and tail portion of the pancreatic duct with a maximal diameter of 8 mm.  Atherosclerotic changes are present in the abdominal aorta, without evidence  of aneurysm.  The urinary bladder is unremarkable.  There is no evidence of  large or small bowel obstruction or inflammation.  There is no evidence of  any dilated vermiform appendix.  There is descending and sigmoid colonic  diverticulosis, without evidence of diverticulitis.  There is no evidence of  a dilated or for appendix.     Bone windows reveal levoconvex scoliosis of the lumbar spine with moderate to  severe degenerative changes L2-3 and L3-4.  There are borderline enlarged  portal caval and nickie hepatic lymph nodes but no definite abdominal or  pelvic lymph node enlargement is identified.     IMPRESSION:  2.8 cm pancreatic body mass with a 4.3 cm left hepatic lobe metastatic  lesion.  Findings are consistent with pancreatic carcinoma.     The findings were sent to the Radiology Results Communication Center at 1:54  pm on 3/20/2024 to be communicated to a licensed caregiver.              Specimen Collected: 03/20/24 13:43 EDT

## 2024-04-25 NOTE — ASSESSMENT & PLAN NOTE
Borderline controlled she declines dose adjustment today stating feels it is more related to her pain and once this is controlled should be good. She has a lot of family support at home. Denies any suicidal or homicidal ideations

## 2024-04-25 NOTE — PROGRESS NOTES
Throat: clear, tongue midline, no drainage, no masses or lesions noted, good dentition  Neck: supple and non-tender without mass, trachea midline, no cervical lymphadenopathy, no bruit, no thyromegaly or nodules  Cardiovascular: regular rate and regular rhythm, normal S1 and S2,   no murmurs, rubs, clicks, or gallop. Distal pulses intact, no carotid bruits. No edema  Pulmonary/Chest: clear to auscultation bilaterally, no wheezes, rales or rhonchi, normal air movement, no respiratory distress  Abdomen: soft, non-tender, non-distended, normal bowel sounds, no masses or hepatosplenomegaly  Neurologic: antalgic gait, coordination and speech normal  Extremities: no clubbing, cyanosis, or edema.   Psychiatric: Good eye contact, normal mood and affect, answers questions appropriately    I have reviewed my findings and recommendations with Marina Green.    Marilynn Schafer, APRN - CNP, NP-C, FNP-BC

## 2024-04-25 NOTE — ASSESSMENT & PLAN NOTE
well controlled, no significant side effects from medication noted. Continue metoprolol XL 50 mg BID labs reviewed:BMP, CBCD, reviewed in epic from oncology stable   -Discussed taking medication as directed every day around the same time. Do not double up if skipped or missed doses.   -Discussed exercising daily 30 minutes 5 times a week for 150 minutes weekly to help with stress reduction and weight reduction if needed.  -Discussed low sodium diet.  -Discussed limiting caffeine consumption and tobacco cessation and the effects they have on the heart and blood pressure.

## 2024-05-06 ENCOUNTER — TELEPHONE (OUTPATIENT)
Dept: FAMILY MEDICINE CLINIC | Age: 69
End: 2024-05-06

## 2024-05-06 NOTE — TELEPHONE ENCOUNTER
Per Marilynn Schafer-  Yes it is okay to hold her plavix and ASA if taking 5 days prior to testing.      I returned patient's call and informed her, as noted by Marilynn.  Patient was agreeable.

## 2024-05-06 NOTE — TELEPHONE ENCOUNTER
Patient called to inform Marilynn Schafer that she's scheduled for an EGD at [facility] on 5/13/24 and was advised to contact her regarding stopping Plavix 5 days prior.  Please advise.    Last seen 4/25/2024  Next appt 6/20/2024

## 2024-05-19 ENCOUNTER — APPOINTMENT (OUTPATIENT)
Dept: GENERAL RADIOLOGY | Age: 69
DRG: 871 | End: 2024-05-19
Payer: MEDICARE

## 2024-05-19 ENCOUNTER — APPOINTMENT (OUTPATIENT)
Dept: CT IMAGING | Age: 69
DRG: 871 | End: 2024-05-19
Payer: MEDICARE

## 2024-05-19 ENCOUNTER — HOSPITAL ENCOUNTER (INPATIENT)
Age: 69
LOS: 1 days | Discharge: ANOTHER HOSPITAL | DRG: 871 | End: 2024-05-20
Attending: EMERGENCY MEDICINE | Admitting: INTERNAL MEDICINE
Payer: MEDICARE

## 2024-05-19 DIAGNOSIS — I48.91 ATRIAL FIBRILLATION, UNSPECIFIED TYPE (HCC): ICD-10-CM

## 2024-05-19 DIAGNOSIS — E86.0 DEHYDRATION: ICD-10-CM

## 2024-05-19 DIAGNOSIS — E87.6 HYPOKALEMIA: ICD-10-CM

## 2024-05-19 DIAGNOSIS — E83.42 HYPOMAGNESEMIA: ICD-10-CM

## 2024-05-19 DIAGNOSIS — J18.9 MULTIFOCAL PNEUMONIA: Primary | ICD-10-CM

## 2024-05-19 DIAGNOSIS — I81 PORTAL VEIN THROMBOSIS: ICD-10-CM

## 2024-05-19 DIAGNOSIS — K52.9 ENTEROCOLITIS: ICD-10-CM

## 2024-05-19 LAB
ALBUMIN SERPL-MCNC: 1.7 G/DL (ref 3.5–5.2)
ALP SERPL-CCNC: 81 U/L (ref 35–104)
ALT SERPL-CCNC: 24 U/L (ref 0–32)
AMORPH SED URNS QL MICRO: PRESENT
ANION GAP SERPL CALCULATED.3IONS-SCNC: 12 MMOL/L (ref 7–16)
AST SERPL-CCNC: 18 U/L (ref 0–31)
BACTERIA URNS QL MICRO: ABNORMAL
BILIRUB SERPL-MCNC: 0.5 MG/DL (ref 0–1.2)
BILIRUB UR QL STRIP: ABNORMAL
BUN SERPL-MCNC: 9 MG/DL (ref 6–23)
CALCIUM SERPL-MCNC: 7.7 MG/DL (ref 8.6–10.2)
CHLORIDE SERPL-SCNC: 106 MMOL/L (ref 98–107)
CLARITY UR: ABNORMAL
CO2 SERPL-SCNC: 26 MMOL/L (ref 22–29)
COLOR UR: YELLOW
CREAT SERPL-MCNC: 0.5 MG/DL (ref 0.5–1)
EPI CELLS #/AREA URNS HPF: ABNORMAL /HPF
ERYTHROCYTE [DISTWIDTH] IN BLOOD BY AUTOMATED COUNT: 13.6 % (ref 11.5–15)
GFR, ESTIMATED: >90 ML/MIN/1.73M2
GLUCOSE SERPL-MCNC: 158 MG/DL (ref 74–99)
GLUCOSE UR STRIP-MCNC: NEGATIVE MG/DL
HCT VFR BLD AUTO: 26.3 % (ref 34–48)
HGB BLD-MCNC: 8.7 G/DL (ref 11.5–15.5)
HGB UR QL STRIP.AUTO: ABNORMAL
INR PPP: 1.5
KETONES UR STRIP-MCNC: 15 MG/DL
LACTATE BLDV-SCNC: 2.5 MMOL/L (ref 0.5–1.9)
LACTATE BLDV-SCNC: 2.5 MMOL/L (ref 0.5–1.9)
LEUKOCYTE ESTERASE UR QL STRIP: NEGATIVE
LIPASE SERPL-CCNC: 4 U/L (ref 13–60)
LYMPHOCYTES RELATIVE PERCENT: 50 % (ref 20–42)
MAGNESIUM SERPL-MCNC: 1.5 MG/DL (ref 1.6–2.6)
MCH RBC QN AUTO: 30.9 PG (ref 26–35)
MCHC RBC AUTO-ENTMCNC: 33.1 G/DL (ref 32–34.5)
MCV RBC AUTO: 93.3 FL (ref 80–99.9)
MONOCYTES NFR BLD: 21 % (ref 2–12)
NEUTROPHILS NFR BLD: 29 % (ref 43–80)
NITRITE UR QL STRIP: POSITIVE
PH UR STRIP: 6.5 [PH] (ref 5–9)
PLATELET CONFIRMATION: NORMAL
PLATELET, FLUORESCENCE: 17 K/UL (ref 130–450)
PMV BLD AUTO: 13.9 FL (ref 7–12)
POTASSIUM SERPL-SCNC: 2.9 MMOL/L (ref 3.5–5)
PROT SERPL-MCNC: 4.5 G/DL (ref 6.4–8.3)
PROT UR STRIP-MCNC: 30 MG/DL
PROTHROMBIN TIME: 17.3 SEC (ref 9.3–12.4)
RBC # BLD AUTO: 2.82 M/UL (ref 3.5–5.5)
RBC # BLD: ABNORMAL 10*6/UL
RBC #/AREA URNS HPF: ABNORMAL /HPF
SODIUM SERPL-SCNC: 144 MMOL/L (ref 132–146)
SP GR UR STRIP: 1.01 (ref 1–1.03)
TROPONIN I SERPL HS-MCNC: 37 NG/L (ref 0–9)
TROPONIN I SERPL HS-MCNC: 40 NG/L (ref 0–9)
TSH SERPL DL<=0.05 MIU/L-ACNC: 0.03 UIU/ML (ref 0.27–4.2)
UROBILINOGEN UR STRIP-ACNC: 1 EU/DL (ref 0–1)
WBC #/AREA URNS HPF: ABNORMAL /HPF
WBC OTHER # BLD: 0.1 K/UL (ref 4.5–11.5)

## 2024-05-19 PROCEDURE — 83690 ASSAY OF LIPASE: CPT

## 2024-05-19 PROCEDURE — 83735 ASSAY OF MAGNESIUM: CPT

## 2024-05-19 PROCEDURE — 6360000004 HC RX CONTRAST MEDICATION: Performed by: RADIOLOGY

## 2024-05-19 PROCEDURE — 2500000003 HC RX 250 WO HCPCS: Performed by: INTERNAL MEDICINE

## 2024-05-19 PROCEDURE — 84443 ASSAY THYROID STIM HORMONE: CPT

## 2024-05-19 PROCEDURE — 80053 COMPREHEN METABOLIC PANEL: CPT

## 2024-05-19 PROCEDURE — 85025 COMPLETE CBC W/AUTO DIFF WBC: CPT

## 2024-05-19 PROCEDURE — 96365 THER/PROPH/DIAG IV INF INIT: CPT

## 2024-05-19 PROCEDURE — 74177 CT ABD & PELVIS W/CONTRAST: CPT

## 2024-05-19 PROCEDURE — 6360000002 HC RX W HCPCS

## 2024-05-19 PROCEDURE — 87040 BLOOD CULTURE FOR BACTERIA: CPT

## 2024-05-19 PROCEDURE — 81001 URINALYSIS AUTO W/SCOPE: CPT

## 2024-05-19 PROCEDURE — 93005 ELECTROCARDIOGRAM TRACING: CPT

## 2024-05-19 PROCEDURE — 99285 EMERGENCY DEPT VISIT HI MDM: CPT

## 2024-05-19 PROCEDURE — 87154 CUL TYP ID BLD PTHGN 6+ TRGT: CPT

## 2024-05-19 PROCEDURE — 2580000003 HC RX 258: Performed by: INTERNAL MEDICINE

## 2024-05-19 PROCEDURE — 71045 X-RAY EXAM CHEST 1 VIEW: CPT

## 2024-05-19 PROCEDURE — 2580000003 HC RX 258

## 2024-05-19 PROCEDURE — 6360000002 HC RX W HCPCS: Performed by: EMERGENCY MEDICINE

## 2024-05-19 PROCEDURE — 96361 HYDRATE IV INFUSION ADD-ON: CPT

## 2024-05-19 PROCEDURE — 2500000003 HC RX 250 WO HCPCS

## 2024-05-19 PROCEDURE — 96367 TX/PROPH/DG ADDL SEQ IV INF: CPT

## 2024-05-19 PROCEDURE — 6370000000 HC RX 637 (ALT 250 FOR IP): Performed by: EMERGENCY MEDICINE

## 2024-05-19 PROCEDURE — 71275 CT ANGIOGRAPHY CHEST: CPT

## 2024-05-19 PROCEDURE — 85610 PROTHROMBIN TIME: CPT

## 2024-05-19 PROCEDURE — 96375 TX/PRO/DX INJ NEW DRUG ADDON: CPT

## 2024-05-19 PROCEDURE — 2060000000 HC ICU INTERMEDIATE R&B

## 2024-05-19 PROCEDURE — 84484 ASSAY OF TROPONIN QUANT: CPT

## 2024-05-19 PROCEDURE — 83605 ASSAY OF LACTIC ACID: CPT

## 2024-05-19 PROCEDURE — 96366 THER/PROPH/DIAG IV INF ADDON: CPT

## 2024-05-19 PROCEDURE — 99223 1ST HOSP IP/OBS HIGH 75: CPT | Performed by: INTERNAL MEDICINE

## 2024-05-19 RX ORDER — ACETAMINOPHEN 325 MG/1
650 TABLET ORAL EVERY 6 HOURS PRN
Status: DISCONTINUED | OUTPATIENT
Start: 2024-05-19 | End: 2024-05-19 | Stop reason: SDUPTHER

## 2024-05-19 RX ORDER — LIDOCAINE HYDROCHLORIDE 20 MG/ML
15 SOLUTION OROPHARYNGEAL ONCE
Status: DISCONTINUED | OUTPATIENT
Start: 2024-05-19 | End: 2024-05-19

## 2024-05-19 RX ORDER — 0.9 % SODIUM CHLORIDE 0.9 %
30 INTRAVENOUS SOLUTION INTRAVENOUS ONCE
Status: COMPLETED | OUTPATIENT
Start: 2024-05-19 | End: 2024-05-19

## 2024-05-19 RX ORDER — SODIUM CHLORIDE 0.9 % (FLUSH) 0.9 %
5-40 SYRINGE (ML) INJECTION EVERY 12 HOURS SCHEDULED
Status: DISCONTINUED | OUTPATIENT
Start: 2024-05-19 | End: 2024-05-20 | Stop reason: HOSPADM

## 2024-05-19 RX ORDER — SODIUM CHLORIDE 0.9 % (FLUSH) 0.9 %
5-40 SYRINGE (ML) INJECTION PRN
Status: DISCONTINUED | OUTPATIENT
Start: 2024-05-19 | End: 2024-05-20 | Stop reason: HOSPADM

## 2024-05-19 RX ORDER — ACETAMINOPHEN 650 MG/1
650 SUPPOSITORY RECTAL EVERY 6 HOURS PRN
Status: DISCONTINUED | OUTPATIENT
Start: 2024-05-19 | End: 2024-05-20 | Stop reason: HOSPADM

## 2024-05-19 RX ORDER — CYCLOBENZAPRINE HCL 10 MG
10 TABLET ORAL NIGHTLY
COMMUNITY

## 2024-05-19 RX ORDER — SODIUM CHLORIDE 9 MG/ML
INJECTION, SOLUTION INTRAVENOUS PRN
Status: DISCONTINUED | OUTPATIENT
Start: 2024-05-19 | End: 2024-05-19 | Stop reason: SDUPTHER

## 2024-05-19 RX ORDER — POTASSIUM CHLORIDE 7.45 MG/ML
10 INJECTION INTRAVENOUS ONCE
Status: COMPLETED | OUTPATIENT
Start: 2024-05-19 | End: 2024-05-19

## 2024-05-19 RX ORDER — FENTANYL 50 UG/1
1 PATCH TRANSDERMAL
COMMUNITY

## 2024-05-19 RX ORDER — SODIUM CHLORIDE 9 MG/ML
INJECTION, SOLUTION INTRAVENOUS PRN
Status: DISCONTINUED | OUTPATIENT
Start: 2024-05-19 | End: 2024-05-20 | Stop reason: HOSPADM

## 2024-05-19 RX ORDER — MORPHINE SULFATE 2 MG/ML
2 INJECTION, SOLUTION INTRAMUSCULAR; INTRAVENOUS EVERY 6 HOURS PRN
Status: DISCONTINUED | OUTPATIENT
Start: 2024-05-19 | End: 2024-05-20 | Stop reason: HOSPADM

## 2024-05-19 RX ORDER — SODIUM CHLORIDE 0.9 % (FLUSH) 0.9 %
10 SYRINGE (ML) INJECTION EVERY 12 HOURS SCHEDULED
Status: DISCONTINUED | OUTPATIENT
Start: 2024-05-19 | End: 2024-05-20 | Stop reason: HOSPADM

## 2024-05-19 RX ORDER — ACETAMINOPHEN 325 MG/1
650 TABLET ORAL EVERY 6 HOURS PRN
Status: DISCONTINUED | OUTPATIENT
Start: 2024-05-19 | End: 2024-05-20 | Stop reason: HOSPADM

## 2024-05-19 RX ORDER — FENTANYL CITRATE 0.05 MG/ML
75 INJECTION, SOLUTION INTRAMUSCULAR; INTRAVENOUS ONCE
Status: COMPLETED | OUTPATIENT
Start: 2024-05-19 | End: 2024-05-19

## 2024-05-19 RX ORDER — MAGNESIUM SULFATE IN WATER 40 MG/ML
2000 INJECTION, SOLUTION INTRAVENOUS ONCE
Status: COMPLETED | OUTPATIENT
Start: 2024-05-19 | End: 2024-05-19

## 2024-05-19 RX ORDER — SODIUM CHLORIDE 0.9 % (FLUSH) 0.9 %
10 SYRINGE (ML) INJECTION PRN
Status: DISCONTINUED | OUTPATIENT
Start: 2024-05-19 | End: 2024-05-20 | Stop reason: HOSPADM

## 2024-05-19 RX ORDER — ONDANSETRON 2 MG/ML
4 INJECTION INTRAMUSCULAR; INTRAVENOUS EVERY 6 HOURS PRN
Status: DISCONTINUED | OUTPATIENT
Start: 2024-05-19 | End: 2024-05-20 | Stop reason: HOSPADM

## 2024-05-19 RX ORDER — POLYETHYLENE GLYCOL 3350 17 G/17G
17 POWDER, FOR SOLUTION ORAL DAILY PRN
Status: DISCONTINUED | OUTPATIENT
Start: 2024-05-19 | End: 2024-05-20 | Stop reason: HOSPADM

## 2024-05-19 RX ORDER — DIPHENHYDRAMINE HYDROCHLORIDE 50 MG/ML
50 INJECTION INTRAMUSCULAR; INTRAVENOUS ONCE
Status: COMPLETED | OUTPATIENT
Start: 2024-05-19 | End: 2024-05-19

## 2024-05-19 RX ORDER — METOPROLOL TARTRATE 1 MG/ML
10 INJECTION, SOLUTION INTRAVENOUS ONCE
Status: COMPLETED | OUTPATIENT
Start: 2024-05-19 | End: 2024-05-19

## 2024-05-19 RX ORDER — FLUCONAZOLE 100 MG/1
200 TABLET ORAL ONCE
Status: DISCONTINUED | OUTPATIENT
Start: 2024-05-19 | End: 2024-05-20 | Stop reason: HOSPADM

## 2024-05-19 RX ORDER — FLUCONAZOLE 200 MG/1
200 TABLET ORAL DAILY
COMMUNITY
Start: 2024-05-14 | End: 2024-05-20

## 2024-05-19 RX ORDER — PROMETHAZINE HYDROCHLORIDE 25 MG/1
12.5 TABLET ORAL EVERY 6 HOURS PRN
Status: DISCONTINUED | OUTPATIENT
Start: 2024-05-19 | End: 2024-05-20 | Stop reason: HOSPADM

## 2024-05-19 RX ADMIN — METOPROLOL TARTRATE 10 MG: 5 INJECTION INTRAVENOUS at 23:28

## 2024-05-19 RX ADMIN — POTASSIUM BICARBONATE 50 MEQ: 978 TABLET, EFFERVESCENT ORAL at 15:30

## 2024-05-19 RX ADMIN — SODIUM CHLORIDE, PRESERVATIVE FREE 10 ML: 5 INJECTION INTRAVENOUS at 22:44

## 2024-05-19 RX ADMIN — IOPAMIDOL 75 ML: 755 INJECTION, SOLUTION INTRAVENOUS at 15:05

## 2024-05-19 RX ADMIN — METHYLPREDNISOLONE SODIUM SUCCINATE 40 MG: 40 INJECTION INTRAMUSCULAR; INTRAVENOUS at 13:35

## 2024-05-19 RX ADMIN — FENTANYL CITRATE 75 MCG: 0.05 INJECTION, SOLUTION INTRAMUSCULAR; INTRAVENOUS at 18:34

## 2024-05-19 RX ADMIN — DIPHENHYDRAMINE HYDROCHLORIDE 50 MG: 50 INJECTION INTRAMUSCULAR; INTRAVENOUS at 13:25

## 2024-05-19 RX ADMIN — SODIUM CHLORIDE 1806 ML: 9 INJECTION, SOLUTION INTRAVENOUS at 13:18

## 2024-05-19 RX ADMIN — Medication 10 ML: at 22:45

## 2024-05-19 RX ADMIN — MAGNESIUM SULFATE HEPTAHYDRATE 2000 MG: 40 INJECTION, SOLUTION INTRAVENOUS at 15:57

## 2024-05-19 RX ADMIN — CEFEPIME 2000 MG: 2 INJECTION, POWDER, FOR SOLUTION INTRAVENOUS at 16:58

## 2024-05-19 RX ADMIN — VANCOMYCIN HYDROCHLORIDE 1250 MG: 10 INJECTION, POWDER, LYOPHILIZED, FOR SOLUTION INTRAVENOUS at 19:50

## 2024-05-19 RX ADMIN — POTASSIUM CHLORIDE 10 MEQ: 7.46 INJECTION, SOLUTION INTRAVENOUS at 15:30

## 2024-05-19 ASSESSMENT — PAIN SCALES - GENERAL
PAINLEVEL_OUTOF10: 0
PAINLEVEL_OUTOF10: 10

## 2024-05-19 NOTE — ED PROVIDER NOTES
[facility] ICU  EMERGENCY DEPARTMENT ENCOUNTER        Pt Name: Marina Green  MRN: [id number]  Birthdate 1955  Date of evaluation: 5/19/2024  Provider: Nahomi Barksdale MD  Attending Provider: No att. providers found  PCP: Marilynn Schafer, SEFERINO - CNP  Note Started: 3:47 PM EDT 5/19/24    CHIEF COMPLAINT       Chief Complaint   Patient presents with    Fatigue       HISTORY OF PRESENT ILLNESS: 1 or more Elements   History From: The patient/EMS        Marina Green is a 68 y.o. female with a past medical history of pancreatic cancer receiving chemotherapy approximately 2 weeks ago, COPD, coronary disease, hypertension, hyperlipidemia presenting with fatigue and diarrhea.  Patient states that for approximately 1 week, she has been having profound watery diarrhea.  She states that she has had increasing fatigue and discomfort and lightheadedness due to her volume loss.  She states that she has had decreased oral intake of food and liquid as well.  Patient is also reporting generalized abdominal discomfort that is cramping in nature.    Nursing Notes were all reviewed and agreed with or any disagreements were addressed in the HPI.      REVIEW OF EXTERNAL NOTE :       5/8/24: Oncology telemedicine visit    [facility]  [facility]  Consultation Note  Genetic Counselor: Maday Lopez MS, [facility]    Patient: Marina Green  MRN: [id number]    This visit was conducted via telephone. I have communicated my name and active licensure. The patient's identity and physical location were verified at the time of this visit. Either the patient or their legal representative has been informed of the risks and benefits of -- and alternatives to -- treatment through a remote evaluation and consents to proceed with the evaluation remotely.    HIGH LEVEL SUMMARY:  The patient's personal history is potentially suggestive of a hereditary pancreatic cancer

## 2024-05-20 ENCOUNTER — APPOINTMENT (OUTPATIENT)
Dept: GENERAL RADIOLOGY | Age: 69
DRG: 871 | End: 2024-05-20
Payer: MEDICARE

## 2024-05-20 ENCOUNTER — APPOINTMENT (OUTPATIENT)
Dept: GENERAL RADIOLOGY | Age: 69
DRG: 871 | End: 2024-05-20
Attending: FAMILY MEDICINE
Payer: MEDICARE

## 2024-05-20 ENCOUNTER — HOSPITAL ENCOUNTER (INPATIENT)
Age: 69
LOS: 1 days | DRG: 871 | End: 2024-05-21
Attending: FAMILY MEDICINE | Admitting: STUDENT IN AN ORGANIZED HEALTH CARE EDUCATION/TRAINING PROGRAM
Payer: MEDICARE

## 2024-05-20 VITALS
BODY MASS INDEX: 19.18 KG/M2 | DIASTOLIC BLOOD PRESSURE: 61 MMHG | OXYGEN SATURATION: 95 % | HEIGHT: 71 IN | HEART RATE: 120 BPM | SYSTOLIC BLOOD PRESSURE: 77 MMHG | RESPIRATION RATE: 18 BRPM | WEIGHT: 137 LBS | TEMPERATURE: 99.4 F

## 2024-05-20 DIAGNOSIS — I82.4Y9 DEEP VEIN THROMBOSIS (DVT) OF PROXIMAL LOWER EXTREMITY, UNSPECIFIED CHRONICITY, UNSPECIFIED LATERALITY (HCC): Primary | ICD-10-CM

## 2024-05-20 DIAGNOSIS — R57.9 SHOCK (HCC): ICD-10-CM

## 2024-05-20 PROBLEM — I81 PORTAL VEIN THROMBOSIS: Status: ACTIVE | Noted: 2024-05-20

## 2024-05-20 PROBLEM — J96.90 RESPIRATORY FAILURE (HCC): Status: ACTIVE | Noted: 2024-05-20

## 2024-05-20 PROBLEM — I82.890 SPLENIC VEIN THROMBOSIS: Status: ACTIVE | Noted: 2024-05-20

## 2024-05-20 PROBLEM — J96.01 ACUTE HYPOXIC RESPIRATORY FAILURE (HCC): Status: ACTIVE | Noted: 2024-05-20

## 2024-05-20 PROBLEM — Z51.5 PALLIATIVE CARE ENCOUNTER: Status: ACTIVE | Noted: 2024-05-20

## 2024-05-20 LAB
AADO2: 359.7 MMHG
AADO2: 431.2 MMHG
AADO2: 460.2 MMHG
AADO2: 597.3 MMHG
AADO2: 598.1 MMHG
ABO + RH BLD: NORMAL
ALBUMIN SERPL-MCNC: 1.6 G/DL (ref 3.5–5.2)
ALBUMIN SERPL-MCNC: 1.8 G/DL (ref 3.5–5.2)
ALBUMIN SERPL-MCNC: 1.9 G/DL (ref 3.5–5.2)
ALBUMIN SERPL-MCNC: 2 G/DL (ref 3.5–5.2)
ALP SERPL-CCNC: 75 U/L (ref 35–104)
ALP SERPL-CCNC: 77 U/L (ref 35–104)
ALP SERPL-CCNC: 86 U/L (ref 35–104)
ALP SERPL-CCNC: 95 U/L (ref 35–104)
ALT SERPL-CCNC: 1340 U/L (ref 0–32)
ALT SERPL-CCNC: 20 U/L (ref 0–32)
ALT SERPL-CCNC: 24 U/L (ref 0–32)
ALT SERPL-CCNC: 25 U/L (ref 0–32)
ANION GAP SERPL CALCULATED.3IONS-SCNC: 12 MMOL/L (ref 7–16)
ANION GAP SERPL CALCULATED.3IONS-SCNC: 15 MMOL/L (ref 7–16)
ANION GAP SERPL CALCULATED.3IONS-SCNC: 15 MMOL/L (ref 7–16)
ANION GAP SERPL CALCULATED.3IONS-SCNC: 25 MMOL/L (ref 7–16)
ARM BAND NUMBER: NORMAL
AST SERPL-CCNC: 18 U/L (ref 0–31)
AST SERPL-CCNC: 22 U/L (ref 0–31)
AST SERPL-CCNC: 39 U/L (ref 0–31)
AST SERPL-CCNC: 4612 U/L (ref 0–31)
B PARAP IS1001 DNA NPH QL NAA+NON-PROBE: NOT DETECTED
B PERT DNA SPEC QL NAA+PROBE: NOT DETECTED
B.E.: -0.4 MMOL/L (ref -3–3)
B.E.: -3.6 MMOL/L (ref -3–3)
B.E.: -5.6 MMOL/L (ref -3–3)
B.E.: -8.1 MMOL/L (ref -3–3)
B.E.: 0.4 MMOL/L (ref -3–3)
B.E.: 1 MMOL/L (ref -3–3)
BASOPHILS # BLD: 0 K/UL (ref 0–0.2)
BASOPHILS NFR BLD: 0 % (ref 0–2)
BILIRUB DIRECT SERPL-MCNC: 0.5 MG/DL (ref 0–0.3)
BILIRUB INDIRECT SERPL-MCNC: 0.2 MG/DL (ref 0–1)
BILIRUB SERPL-MCNC: 0.6 MG/DL (ref 0–1.2)
BILIRUB SERPL-MCNC: 0.6 MG/DL (ref 0–1.2)
BILIRUB SERPL-MCNC: 0.7 MG/DL (ref 0–1.2)
BILIRUB SERPL-MCNC: 1.7 MG/DL (ref 0–1.2)
BLOOD BANK COMMENT: NORMAL
BLOOD BANK SAMPLE EXPIRATION: NORMAL
BLOOD GROUP ANTIBODIES SERPL: NEGATIVE
BUN SERPL-MCNC: 10 MG/DL (ref 6–23)
BUN SERPL-MCNC: 11 MG/DL (ref 6–23)
BUN SERPL-MCNC: 13 MG/DL (ref 6–23)
BUN SERPL-MCNC: 17 MG/DL (ref 6–23)
BUN SERPL-MCNC: 19 MG/DL (ref 6–23)
BUN SERPL-MCNC: 9 MG/DL (ref 6–23)
C PNEUM DNA NPH QL NAA+NON-PROBE: NOT DETECTED
CA-I BLD-SCNC: 1.13 MMOL/L (ref 1.15–1.33)
CALCIUM SERPL-MCNC: 7.1 MG/DL (ref 8.6–10.2)
CALCIUM SERPL-MCNC: 7.3 MG/DL (ref 8.6–10.2)
CALCIUM SERPL-MCNC: 7.6 MG/DL (ref 8.6–10.2)
CALCIUM SERPL-MCNC: 7.8 MG/DL (ref 8.6–10.2)
CALCIUM SERPL-MCNC: 8 MG/DL (ref 8.6–10.2)
CALCIUM SERPL-MCNC: 8.1 MG/DL (ref 8.6–10.2)
CHLORIDE SERPL-SCNC: 109 MMOL/L (ref 98–107)
CHLORIDE SERPL-SCNC: 109 MMOL/L (ref 98–107)
CHLORIDE SERPL-SCNC: 110 MMOL/L (ref 98–107)
CHLORIDE SERPL-SCNC: 110 MMOL/L (ref 98–107)
CHLORIDE SERPL-SCNC: 111 MMOL/L (ref 98–107)
CHLORIDE SERPL-SCNC: 111 MMOL/L (ref 98–107)
CO2 SERPL-SCNC: 14 MMOL/L (ref 22–29)
CO2 SERPL-SCNC: 22 MMOL/L (ref 22–29)
CO2 SERPL-SCNC: 22 MMOL/L (ref 22–29)
CO2 SERPL-SCNC: 26 MMOL/L (ref 22–29)
COHB: 0 % (ref 0–1.5)
COHB: 0.1 % (ref 0–1.5)
COHB: 0.2 % (ref 0–1.5)
COHB: 0.3 % (ref 0–1.5)
CORTIS SERPL-MCNC: 161.4 UG/DL (ref 2.7–18.4)
CORTISOL COLLECTION INFO: ABNORMAL
CREAT SERPL-MCNC: 0.5 MG/DL (ref 0.5–1)
CREAT SERPL-MCNC: 0.5 MG/DL (ref 0.5–1)
CREAT SERPL-MCNC: 0.6 MG/DL (ref 0.5–1)
CREAT SERPL-MCNC: 0.7 MG/DL (ref 0.5–1)
CREAT SERPL-MCNC: 1 MG/DL (ref 0.5–1)
CREAT SERPL-MCNC: 1.3 MG/DL (ref 0.5–1)
CRITICAL: ABNORMAL
DATE ANALYZED: ABNORMAL
DATE OF COLLECTION: ABNORMAL
EKG ATRIAL RATE: 107 BPM
EKG P AXIS: 67 DEGREES
EKG P-R INTERVAL: 130 MS
EKG Q-T INTERVAL: 330 MS
EKG QRS DURATION: 74 MS
EKG QTC CALCULATION (BAZETT): 440 MS
EKG R AXIS: 74 DEGREES
EKG T AXIS: 89 DEGREES
EKG VENTRICULAR RATE: 107 BPM
EOSINOPHIL # BLD: 0 K/UL (ref 0.05–0.5)
EOSINOPHILS RELATIVE PERCENT: 0 % (ref 0–6)
EOSINOPHILS RELATIVE PERCENT: 0 % (ref 0–6)
EOSINOPHILS RELATIVE PERCENT: 2 % (ref 0–6)
ERYTHROCYTE [DISTWIDTH] IN BLOOD BY AUTOMATED COUNT: 13.8 % (ref 11.5–15)
ERYTHROCYTE [DISTWIDTH] IN BLOOD BY AUTOMATED COUNT: 14 % (ref 11.5–15)
ERYTHROCYTE [DISTWIDTH] IN BLOOD BY AUTOMATED COUNT: 14.1 % (ref 11.5–15)
ERYTHROCYTE [DISTWIDTH] IN BLOOD BY AUTOMATED COUNT: 14.8 % (ref 11.5–15)
FIO2: 100 %
FIO2: 100 %
FIO2: 75 %
FIO2: 80 %
FIO2: 80 %
FLUAV RNA NPH QL NAA+NON-PROBE: NOT DETECTED
FLUBV RNA NPH QL NAA+NON-PROBE: NOT DETECTED
GFR, ESTIMATED: 44 ML/MIN/1.73M2
GFR, ESTIMATED: 64 ML/MIN/1.73M2
GFR, ESTIMATED: 87 ML/MIN/1.73M2
GFR, ESTIMATED: >90 ML/MIN/1.73M2
GLUCOSE BLD-MCNC: 193 MG/DL (ref 74–99)
GLUCOSE BLD-MCNC: 46 MG/DL (ref 74–99)
GLUCOSE BLD-MCNC: <40 MG/DL (ref 74–99)
GLUCOSE SERPL-MCNC: 111 MG/DL (ref 74–99)
GLUCOSE SERPL-MCNC: 143 MG/DL (ref 74–99)
GLUCOSE SERPL-MCNC: 153 MG/DL (ref 74–99)
GLUCOSE SERPL-MCNC: 158 MG/DL (ref 74–99)
GLUCOSE SERPL-MCNC: 37 MG/DL (ref 74–99)
GLUCOSE SERPL-MCNC: 65 MG/DL (ref 74–99)
HADV DNA NPH QL NAA+NON-PROBE: NOT DETECTED
HCO3: 17.1 MMOL/L (ref 22–26)
HCO3: 20.8 MMOL/L (ref 22–26)
HCO3: 21.9 MMOL/L (ref 22–26)
HCO3: 24.8 MMOL/L (ref 22–26)
HCO3: 25.5 MMOL/L (ref 22–26)
HCO3: 25.7 MMOL/L (ref 22–26)
HCOV 229E RNA NPH QL NAA+NON-PROBE: NOT DETECTED
HCOV HKU1 RNA NPH QL NAA+NON-PROBE: NOT DETECTED
HCOV NL63 RNA NPH QL NAA+NON-PROBE: NOT DETECTED
HCOV OC43 RNA NPH QL NAA+NON-PROBE: NOT DETECTED
HCT VFR BLD AUTO: 27 % (ref 34–48)
HCT VFR BLD AUTO: 30.3 % (ref 34–48)
HCT VFR BLD AUTO: 30.7 % (ref 34–48)
HCT VFR BLD AUTO: 31.4 % (ref 34–48)
HGB BLD-MCNC: 10.1 G/DL (ref 11.5–15.5)
HGB BLD-MCNC: 10.2 G/DL (ref 11.5–15.5)
HGB BLD-MCNC: 9.1 G/DL (ref 11.5–15.5)
HGB BLD-MCNC: 9.9 G/DL (ref 11.5–15.5)
HHB: 11.9 % (ref 0–5)
HHB: 2.1 % (ref 0–5)
HHB: 20.9 % (ref 0–5)
HHB: 5.1 % (ref 0–5)
HHB: 5.3 % (ref 0–5)
HHB: 8.1 % (ref 0–5)
HMPV RNA NPH QL NAA+NON-PROBE: NOT DETECTED
HPIV1 RNA NPH QL NAA+NON-PROBE: NOT DETECTED
HPIV2 RNA NPH QL NAA+NON-PROBE: NOT DETECTED
HPIV3 RNA NPH QL NAA+NON-PROBE: NOT DETECTED
HPIV4 RNA NPH QL NAA+NON-PROBE: NOT DETECTED
INR PPP: 1.5
INR PPP: 1.7
LAB: ABNORMAL
LACTATE BLDV-SCNC: 4.9 MMOL/L (ref 0.5–2.2)
LACTATE BLDV-SCNC: 5.2 MMOL/L (ref 0.5–2.2)
LACTATE BLDV-SCNC: 6.7 MMOL/L (ref 0.5–2.2)
LYMPHOCYTES NFR BLD: 0.06 K/UL (ref 1.5–4)
LYMPHOCYTES NFR BLD: 0.06 K/UL (ref 1.5–4)
LYMPHOCYTES NFR BLD: 0.08 K/UL (ref 1.5–4)
LYMPHOCYTES RELATIVE PERCENT: 40 % (ref 20–42)
LYMPHOCYTES RELATIVE PERCENT: 56 % (ref 20–42)
LYMPHOCYTES RELATIVE PERCENT: 60 % (ref 20–42)
Lab: 1615
Lab: 1810
Lab: 318
Lab: 328
Lab: 50
Lab: 943
M PNEUMO DNA NPH QL NAA+NON-PROBE: NOT DETECTED
MAGNESIUM SERPL-MCNC: 1.7 MG/DL (ref 1.6–2.6)
MAGNESIUM SERPL-MCNC: 1.7 MG/DL (ref 1.6–2.6)
MAGNESIUM SERPL-MCNC: 2.2 MG/DL (ref 1.6–2.6)
MAGNESIUM SERPL-MCNC: 2.2 MG/DL (ref 1.6–2.6)
MAGNESIUM SERPL-MCNC: 2.4 MG/DL (ref 1.6–2.6)
MCH RBC QN AUTO: 29.7 PG (ref 26–35)
MCH RBC QN AUTO: 30.8 PG (ref 26–35)
MCH RBC QN AUTO: 31 PG (ref 26–35)
MCH RBC QN AUTO: 31.4 PG (ref 26–35)
MCHC RBC AUTO-ENTMCNC: 32.2 G/DL (ref 32–34.5)
MCHC RBC AUTO-ENTMCNC: 32.5 G/DL (ref 32–34.5)
MCHC RBC AUTO-ENTMCNC: 33.3 G/DL (ref 32–34.5)
MCHC RBC AUTO-ENTMCNC: 33.7 G/DL (ref 32–34.5)
MCV RBC AUTO: 91.5 FL (ref 80–99.9)
MCV RBC AUTO: 92.2 FL (ref 80–99.9)
MCV RBC AUTO: 94.1 FL (ref 80–99.9)
MCV RBC AUTO: 95.4 FL (ref 80–99.9)
METAMYELOCYTES: 1 % (ref 0–1)
METHB: 0 % (ref 0–1.5)
METHB: 0 % (ref 0–1.5)
METHB: 0.2 % (ref 0–1.5)
METHB: 0.3 % (ref 0–1.5)
METHB: 0.3 % (ref 0–1.5)
METHB: 0.5 % (ref 0–1.5)
MODE: ABNORMAL
MODE: AC
MONOCYTES NFR BLD: 0.01 K/UL (ref 0.1–0.95)
MONOCYTES NFR BLD: 0.02 K/UL (ref 0.1–0.95)
MONOCYTES NFR BLD: 0.1 K/UL (ref 0.1–0.95)
MONOCYTES NFR BLD: 14 % (ref 2–12)
MONOCYTES NFR BLD: 20 % (ref 2–12)
MONOCYTES NFR BLD: 50 % (ref 2–12)
MYELOCYTES: 1 %
NEUTROPHILS NFR BLD: 10 % (ref 43–80)
NEUTROPHILS NFR BLD: 20 % (ref 43–80)
NEUTROPHILS NFR BLD: 26 % (ref 43–80)
NEUTS SEG NFR BLD: 0.02 K/UL (ref 1.8–7.3)
NEUTS SEG NFR BLD: 0.02 K/UL (ref 1.8–7.3)
NEUTS SEG NFR BLD: 0.03 K/UL (ref 1.8–7.3)
O2 CONTENT: 11.3 ML/DL
O2 CONTENT: 12.8 ML/DL
O2 CONTENT: 13.6 ML/DL
O2 CONTENT: 14.6 ML/DL
O2 CONTENT: 14.6 ML/DL
O2 CONTENT: 14.8 ML/DL
O2 SATURATION: 79 % (ref 92–98.5)
O2 SATURATION: 88.1 % (ref 92–98.5)
O2 SATURATION: 91.9 % (ref 92–98.5)
O2 SATURATION: 94.7 % (ref 92–98.5)
O2 SATURATION: 94.9 % (ref 92–98.5)
O2 SATURATION: 97.9 % (ref 92–98.5)
O2HB: 78.7 % (ref 94–97)
O2HB: 87.9 % (ref 94–97)
O2HB: 91.6 % (ref 94–97)
O2HB: 94.1 % (ref 94–97)
O2HB: 94.4 % (ref 94–97)
O2HB: 97.4 % (ref 94–97)
OPERATOR ID: 359
OPERATOR ID: 914
OPERATOR ID: ABNORMAL
PARTIAL THROMBOPLASTIN TIME: 32.2 SEC (ref 24.5–35.1)
PARTIAL THROMBOPLASTIN TIME: 36.2 SEC (ref 24.5–35.1)
PATIENT TEMP: 37 C
PCO2: 34 MMHG (ref 35–45)
PCO2: 41.6 MMHG (ref 35–45)
PCO2: 41.6 MMHG (ref 35–45)
PCO2: 42.9 MMHG (ref 35–45)
PCO2: 43 MMHG (ref 35–45)
PCO2: 44 MMHG (ref 35–45)
PEEP/CPAP: 10 CMH2O
PEEP/CPAP: 5 CMH2O
PEEP/CPAP: 5 CMH2O
PFO2: 0.56 MMHG/%
PFO2: 0.71 MMHG/%
PFO2: 0.82 MMHG/%
PFO2: 0.94 MMHG/%
PFO2: 1.75 MMHG/%
PH BLOOD GAS: 7.29 (ref 7.35–7.45)
PH BLOOD GAS: 7.32 (ref 7.35–7.45)
PH BLOOD GAS: 7.34 (ref 7.35–7.45)
PH BLOOD GAS: 7.38 (ref 7.35–7.45)
PH BLOOD GAS: 7.39 (ref 7.35–7.45)
PH BLOOD GAS: 7.41 (ref 7.35–7.45)
PHOSPHATE SERPL-MCNC: 2.1 MG/DL (ref 2.5–4.5)
PHOSPHATE SERPL-MCNC: 3.3 MG/DL (ref 2.5–4.5)
PHOSPHATE SERPL-MCNC: 4.4 MG/DL (ref 2.5–4.5)
PHOSPHATE SERPL-MCNC: 5.1 MG/DL (ref 2.5–4.5)
PLATELET # BLD AUTO: 16 K/UL (ref 130–450)
PLATELET CONFIRMATION: NORMAL
PLATELET, FLUORESCENCE: 12 K/UL (ref 130–450)
PLATELET, FLUORESCENCE: 13 K/UL (ref 130–450)
PLATELET, FLUORESCENCE: 9 K/UL (ref 130–450)
PMV BLD AUTO: ABNORMAL FL (ref 7–12)
PO2: 130.9 MMHG (ref 75–100)
PO2: 45.2 MMHG (ref 75–100)
PO2: 56.5 MMHG (ref 75–100)
PO2: 70.9 MMHG (ref 75–100)
PO2: 75.5 MMHG (ref 75–100)
PO2: 81.7 MMHG (ref 75–100)
POTASSIUM SERPL-SCNC: 2.7 MMOL/L (ref 3.5–5)
POTASSIUM SERPL-SCNC: 3 MMOL/L (ref 3.5–5)
POTASSIUM SERPL-SCNC: 3.3 MMOL/L (ref 3.5–5)
POTASSIUM SERPL-SCNC: 3.6 MMOL/L (ref 3.5–5)
POTASSIUM SERPL-SCNC: 4 MMOL/L (ref 3.5–5)
POTASSIUM SERPL-SCNC: 5 MMOL/L (ref 3.5–5)
PROCALCITONIN SERPL-MCNC: 15.95 NG/ML (ref 0–0.08)
PROT SERPL-MCNC: 3.7 G/DL (ref 6.4–8.3)
PROT SERPL-MCNC: 4.3 G/DL (ref 6.4–8.3)
PROT SERPL-MCNC: 4.4 G/DL (ref 6.4–8.3)
PROT SERPL-MCNC: 4.8 G/DL (ref 6.4–8.3)
PROTHROMBIN TIME: 16.2 SEC (ref 9.3–12.4)
PROTHROMBIN TIME: 18.9 SEC (ref 9.3–12.4)
RBC # BLD AUTO: 2.95 M/UL (ref 3.5–5.5)
RBC # BLD AUTO: 3.22 M/UL (ref 3.5–5.5)
RBC # BLD AUTO: 3.29 M/UL (ref 3.5–5.5)
RBC # BLD AUTO: 3.33 M/UL (ref 3.5–5.5)
RBC # BLD: ABNORMAL 10*6/UL
RI(T): 10.18
RI(T): 2.75
RI(T): 5.71
RI(T): 7.31
RI(T): 8.44
RR MECHANICAL: 10 B/MIN
RR MECHANICAL: 10 B/MIN
RR MECHANICAL: 16 B/MIN
RSV RNA NPH QL NAA+NON-PROBE: NOT DETECTED
RV+EV RNA NPH QL NAA+NON-PROBE: NOT DETECTED
SARS-COV-2 RNA NPH QL NAA+NON-PROBE: NOT DETECTED
SODIUM SERPL-SCNC: 147 MMOL/L (ref 132–146)
SODIUM SERPL-SCNC: 148 MMOL/L (ref 132–146)
SODIUM SERPL-SCNC: 149 MMOL/L (ref 132–146)
SODIUM SERPL-SCNC: 149 MMOL/L (ref 132–146)
SOURCE, BLOOD GAS: ABNORMAL
SPECIMEN DESCRIPTION: NORMAL
T4 FREE SERPL-MCNC: 0.8 NG/DL (ref 0.9–1.7)
THB: 10.2 G/DL (ref 11.5–16.5)
THB: 10.2 G/DL (ref 11.5–16.5)
THB: 10.3 G/DL (ref 11.5–16.5)
THB: 10.5 G/DL (ref 11.5–16.5)
THB: 11.1 G/DL (ref 11.5–16.5)
THB: 11.3 G/DL (ref 11.5–16.5)
TIME ANALYZED: 1633
TIME ANALYZED: 1812
TIME ANALYZED: 323
TIME ANALYZED: 330
TIME ANALYZED: 54
TIME ANALYZED: 959
TROPONIN I SERPL HS-MCNC: 102 NG/L (ref 0–9)
TSH SERPL DL<=0.05 MIU/L-ACNC: 0.04 UIU/ML (ref 0.27–4.2)
VT MECHANICAL: 350 ML
VT MECHANICAL: 500 ML
WBC OTHER # BLD: 0.1 K/UL (ref 4.5–11.5)
WBC OTHER # BLD: 0.1 K/UL (ref 4.5–11.5)
WBC OTHER # BLD: 0.2 K/UL (ref 4.5–11.5)
WBC OTHER # BLD: 0.3 K/UL (ref 4.5–11.5)

## 2024-05-20 PROCEDURE — 85610 PROTHROMBIN TIME: CPT

## 2024-05-20 PROCEDURE — 89220 SPUTUM SPECIMEN COLLECTION: CPT

## 2024-05-20 PROCEDURE — 94002 VENT MGMT INPAT INIT DAY: CPT

## 2024-05-20 PROCEDURE — 86901 BLOOD TYPING SEROLOGIC RH(D): CPT

## 2024-05-20 PROCEDURE — 2500000003 HC RX 250 WO HCPCS

## 2024-05-20 PROCEDURE — 85025 COMPLETE CBC W/AUTO DIFF WBC: CPT

## 2024-05-20 PROCEDURE — P9016 RBC LEUKOCYTES REDUCED: HCPCS

## 2024-05-20 PROCEDURE — P9047 ALBUMIN (HUMAN), 25%, 50ML: HCPCS | Performed by: INTERNAL MEDICINE

## 2024-05-20 PROCEDURE — 93005 ELECTROCARDIOGRAM TRACING: CPT | Performed by: STUDENT IN AN ORGANIZED HEALTH CARE EDUCATION/TRAINING PROGRAM

## 2024-05-20 PROCEDURE — 82533 TOTAL CORTISOL: CPT

## 2024-05-20 PROCEDURE — 80048 BASIC METABOLIC PNL TOTAL CA: CPT

## 2024-05-20 PROCEDURE — 83735 ASSAY OF MAGNESIUM: CPT

## 2024-05-20 PROCEDURE — 87449 NOS EACH ORGANISM AG IA: CPT

## 2024-05-20 PROCEDURE — 84100 ASSAY OF PHOSPHORUS: CPT

## 2024-05-20 PROCEDURE — 86850 RBC ANTIBODY SCREEN: CPT

## 2024-05-20 PROCEDURE — 82962 GLUCOSE BLOOD TEST: CPT

## 2024-05-20 PROCEDURE — 99222 1ST HOSP IP/OBS MODERATE 55: CPT | Performed by: SURGERY

## 2024-05-20 PROCEDURE — 74018 RADEX ABDOMEN 1 VIEW: CPT

## 2024-05-20 PROCEDURE — 84439 ASSAY OF FREE THYROXINE: CPT

## 2024-05-20 PROCEDURE — 93010 ELECTROCARDIOGRAM REPORT: CPT | Performed by: INTERNAL MEDICINE

## 2024-05-20 PROCEDURE — A4216 STERILE WATER/SALINE, 10 ML: HCPCS

## 2024-05-20 PROCEDURE — C9113 INJ PANTOPRAZOLE SODIUM, VIA: HCPCS

## 2024-05-20 PROCEDURE — 36415 COLL VENOUS BLD VENIPUNCTURE: CPT

## 2024-05-20 PROCEDURE — P9073 PLATELETS PHERESIS PATH REDU: HCPCS

## 2024-05-20 PROCEDURE — 2000000000 HC ICU R&B

## 2024-05-20 PROCEDURE — 84443 ASSAY THYROID STIM HORMONE: CPT

## 2024-05-20 PROCEDURE — 5A1935Z RESPIRATORY VENTILATION, LESS THAN 24 CONSECUTIVE HOURS: ICD-10-PCS | Performed by: INTERNAL MEDICINE

## 2024-05-20 PROCEDURE — 6360000002 HC RX W HCPCS

## 2024-05-20 PROCEDURE — 86403 PARTICLE AGGLUT ANTBDY SCRN: CPT

## 2024-05-20 PROCEDURE — 71045 X-RAY EXAM CHEST 1 VIEW: CPT

## 2024-05-20 PROCEDURE — A4216 STERILE WATER/SALINE, 10 ML: HCPCS | Performed by: INTERNAL MEDICINE

## 2024-05-20 PROCEDURE — 83605 ASSAY OF LACTIC ACID: CPT

## 2024-05-20 PROCEDURE — 87070 CULTURE OTHR SPECIMN AEROBIC: CPT

## 2024-05-20 PROCEDURE — 94664 DEMO&/EVAL PT USE INHALER: CPT

## 2024-05-20 PROCEDURE — 6360000002 HC RX W HCPCS: Performed by: INTERNAL MEDICINE

## 2024-05-20 PROCEDURE — 36600 WITHDRAWAL OF ARTERIAL BLOOD: CPT

## 2024-05-20 PROCEDURE — 2500000003 HC RX 250 WO HCPCS: Performed by: INTERNAL MEDICINE

## 2024-05-20 PROCEDURE — 99497 ADVNCD CARE PLAN 30 MIN: CPT | Performed by: NURSE PRACTITIONER

## 2024-05-20 PROCEDURE — 2580000003 HC RX 258

## 2024-05-20 PROCEDURE — 82805 BLOOD GASES W/O2 SATURATION: CPT

## 2024-05-20 PROCEDURE — 0BH17EZ INSERTION OF ENDOTRACHEAL AIRWAY INTO TRACHEA, VIA NATURAL OR ARTIFICIAL OPENING: ICD-10-PCS | Performed by: STUDENT IN AN ORGANIZED HEALTH CARE EDUCATION/TRAINING PROGRAM

## 2024-05-20 PROCEDURE — 2580000003 HC RX 258: Performed by: STUDENT IN AN ORGANIZED HEALTH CARE EDUCATION/TRAINING PROGRAM

## 2024-05-20 PROCEDURE — 2580000003 HC RX 258: Performed by: INTERNAL MEDICINE

## 2024-05-20 PROCEDURE — 84145 PROCALCITONIN (PCT): CPT

## 2024-05-20 PROCEDURE — 31500 INSERT EMERGENCY AIRWAY: CPT

## 2024-05-20 PROCEDURE — 84484 ASSAY OF TROPONIN QUANT: CPT

## 2024-05-20 PROCEDURE — 6370000000 HC RX 637 (ALT 250 FOR IP): Performed by: INTERNAL MEDICINE

## 2024-05-20 PROCEDURE — 82330 ASSAY OF CALCIUM: CPT

## 2024-05-20 PROCEDURE — 82248 BILIRUBIN DIRECT: CPT

## 2024-05-20 PROCEDURE — 80053 COMPREHEN METABOLIC PANEL: CPT

## 2024-05-20 PROCEDURE — 86900 BLOOD TYPING SEROLOGIC ABO: CPT

## 2024-05-20 PROCEDURE — 85730 THROMBOPLASTIN TIME PARTIAL: CPT

## 2024-05-20 PROCEDURE — 99221 1ST HOSP IP/OBS SF/LOW 40: CPT | Performed by: NURSE PRACTITIONER

## 2024-05-20 PROCEDURE — 51702 INSERT TEMP BLADDER CATH: CPT

## 2024-05-20 PROCEDURE — 36430 TRANSFUSION BLD/BLD COMPNT: CPT

## 2024-05-20 PROCEDURE — 5A09357 ASSISTANCE WITH RESPIRATORY VENTILATION, LESS THAN 24 CONSECUTIVE HOURS, CONTINUOUS POSITIVE AIRWAY PRESSURE: ICD-10-PCS | Performed by: HOSPITALIST

## 2024-05-20 PROCEDURE — 5A1935Z RESPIRATORY VENTILATION, LESS THAN 24 CONSECUTIVE HOURS: ICD-10-PCS | Performed by: STUDENT IN AN ORGANIZED HEALTH CARE EDUCATION/TRAINING PROGRAM

## 2024-05-20 PROCEDURE — 0202U NFCT DS 22 TRGT SARS-COV-2: CPT

## 2024-05-20 PROCEDURE — 87081 CULTURE SCREEN ONLY: CPT

## 2024-05-20 PROCEDURE — 99291 CRITICAL CARE FIRST HOUR: CPT | Performed by: INTERNAL MEDICINE

## 2024-05-20 PROCEDURE — 87077 CULTURE AEROBIC IDENTIFY: CPT

## 2024-05-20 PROCEDURE — 85027 COMPLETE CBC AUTOMATED: CPT

## 2024-05-20 PROCEDURE — C9113 INJ PANTOPRAZOLE SODIUM, VIA: HCPCS | Performed by: INTERNAL MEDICINE

## 2024-05-20 PROCEDURE — 6370000000 HC RX 637 (ALT 250 FOR IP): Performed by: STUDENT IN AN ORGANIZED HEALTH CARE EDUCATION/TRAINING PROGRAM

## 2024-05-20 PROCEDURE — 0BH17EZ INSERTION OF ENDOTRACHEAL AIRWAY INTO TRACHEA, VIA NATURAL OR ARTIFICIAL OPENING: ICD-10-PCS | Performed by: INTERNAL MEDICINE

## 2024-05-20 PROCEDURE — 87205 SMEAR GRAM STAIN: CPT

## 2024-05-20 PROCEDURE — 86923 COMPATIBILITY TEST ELECTRIC: CPT

## 2024-05-20 RX ORDER — MAGNESIUM SULFATE IN WATER 40 MG/ML
2000 INJECTION, SOLUTION INTRAVENOUS ONCE
Status: COMPLETED | OUTPATIENT
Start: 2024-05-20 | End: 2024-05-20

## 2024-05-20 RX ORDER — SODIUM CHLORIDE 9 MG/ML
INJECTION, SOLUTION INTRAVENOUS PRN
Status: DISCONTINUED | OUTPATIENT
Start: 2024-05-20 | End: 2024-05-20 | Stop reason: HOSPADM

## 2024-05-20 RX ORDER — MORPHINE SULFATE 4 MG/ML
4 INJECTION, SOLUTION INTRAMUSCULAR; INTRAVENOUS
Status: DISCONTINUED | OUTPATIENT
Start: 2024-05-20 | End: 2024-05-21 | Stop reason: HOSPADM

## 2024-05-20 RX ORDER — TRANEXAMIC ACID 100 MG/ML
1000 INJECTION, SOLUTION INTRAVENOUS ONCE
Status: DISCONTINUED | OUTPATIENT
Start: 2024-05-20 | End: 2024-05-20 | Stop reason: DRUGHIGH

## 2024-05-20 RX ORDER — HYDROMORPHONE HYDROCHLORIDE 2 MG/1
1 TABLET ORAL EVERY 4 HOURS PRN
Status: DISCONTINUED | OUTPATIENT
Start: 2024-05-20 | End: 2024-05-20 | Stop reason: SDUPTHER

## 2024-05-20 RX ORDER — ONDANSETRON 2 MG/ML
4 INJECTION INTRAMUSCULAR; INTRAVENOUS EVERY 6 HOURS PRN
Status: DISCONTINUED | OUTPATIENT
Start: 2024-05-20 | End: 2024-05-20

## 2024-05-20 RX ORDER — HYDROMORPHONE HYDROCHLORIDE 2 MG/1
1 TABLET ORAL EVERY 4 HOURS PRN
Status: DISCONTINUED | OUTPATIENT
Start: 2024-05-20 | End: 2024-05-20

## 2024-05-20 RX ORDER — FENTANYL CITRATE-0.9 % NACL/PF 10 MCG/ML
25-200 PLASTIC BAG, INJECTION (ML) INTRAVENOUS CONTINUOUS
Status: DISCONTINUED | OUTPATIENT
Start: 2024-05-20 | End: 2024-05-20

## 2024-05-20 RX ORDER — SODIUM CHLORIDE 0.9 % (FLUSH) 0.9 %
5-40 SYRINGE (ML) INJECTION EVERY 12 HOURS SCHEDULED
Status: DISCONTINUED | OUTPATIENT
Start: 2024-05-20 | End: 2024-05-20

## 2024-05-20 RX ORDER — DEXTROSE MONOHYDRATE 100 MG/ML
INJECTION, SOLUTION INTRAVENOUS CONTINUOUS PRN
Status: DISCONTINUED | OUTPATIENT
Start: 2024-05-20 | End: 2024-05-20

## 2024-05-20 RX ORDER — POLYETHYLENE GLYCOL 3350 17 G/17G
17 POWDER, FOR SOLUTION ORAL DAILY PRN
Status: DISCONTINUED | OUTPATIENT
Start: 2024-05-20 | End: 2024-05-20

## 2024-05-20 RX ORDER — NOREPINEPHRINE BITARTRATE 0.06 MG/ML
1-100 INJECTION, SOLUTION INTRAVENOUS CONTINUOUS
Status: DISCONTINUED | OUTPATIENT
Start: 2024-05-20 | End: 2024-05-20

## 2024-05-20 RX ORDER — SODIUM CHLORIDE, SODIUM LACTATE, POTASSIUM CHLORIDE, AND CALCIUM CHLORIDE .6; .31; .03; .02 G/100ML; G/100ML; G/100ML; G/100ML
1000 INJECTION, SOLUTION INTRAVENOUS ONCE
Status: DISCONTINUED | OUTPATIENT
Start: 2024-05-20 | End: 2024-05-20

## 2024-05-20 RX ORDER — ETOMIDATE 2 MG/ML
20 INJECTION INTRAVENOUS ONCE
Status: DISCONTINUED | OUTPATIENT
Start: 2024-05-20 | End: 2024-05-20 | Stop reason: HOSPADM

## 2024-05-20 RX ORDER — SODIUM CHLORIDE, SODIUM LACTATE, POTASSIUM CHLORIDE, AND CALCIUM CHLORIDE .6; .31; .03; .02 G/100ML; G/100ML; G/100ML; G/100ML
1000 INJECTION, SOLUTION INTRAVENOUS ONCE
Status: COMPLETED | OUTPATIENT
Start: 2024-05-20 | End: 2024-05-20

## 2024-05-20 RX ORDER — PROPOFOL 10 MG/ML
5-50 INJECTION, EMULSION INTRAVENOUS CONTINUOUS
Status: DISCONTINUED | OUTPATIENT
Start: 2024-05-20 | End: 2024-05-20 | Stop reason: HOSPADM

## 2024-05-20 RX ORDER — MIDAZOLAM HYDROCHLORIDE 1 MG/ML
1-10 INJECTION, SOLUTION INTRAVENOUS CONTINUOUS
Status: DISCONTINUED | OUTPATIENT
Start: 2024-05-20 | End: 2024-05-20

## 2024-05-20 RX ORDER — FLUCONAZOLE 2 MG/ML
200 INJECTION, SOLUTION INTRAVENOUS EVERY 24 HOURS
Status: DISCONTINUED | OUTPATIENT
Start: 2024-05-20 | End: 2024-05-20

## 2024-05-20 RX ORDER — ACETAMINOPHEN 325 MG/1
650 TABLET ORAL EVERY 6 HOURS PRN
Status: DISCONTINUED | OUTPATIENT
Start: 2024-05-20 | End: 2024-05-20

## 2024-05-20 RX ORDER — DEXMEDETOMIDINE HYDROCHLORIDE 4 UG/ML
.1-1.5 INJECTION, SOLUTION INTRAVENOUS CONTINUOUS
Status: DISCONTINUED | OUTPATIENT
Start: 2024-05-20 | End: 2024-05-20 | Stop reason: HOSPADM

## 2024-05-20 RX ORDER — ACETAMINOPHEN 650 MG/1
650 SUPPOSITORY RECTAL EVERY 4 HOURS PRN
Status: DISCONTINUED | OUTPATIENT
Start: 2024-05-20 | End: 2024-05-21 | Stop reason: HOSPADM

## 2024-05-20 RX ORDER — MINERAL OIL AND WHITE PETROLATUM 150; 830 MG/G; MG/G
OINTMENT OPHTHALMIC EVERY 4 HOURS
Status: DISCONTINUED | OUTPATIENT
Start: 2024-05-20 | End: 2024-05-20

## 2024-05-20 RX ORDER — SODIUM CHLORIDE, SODIUM LACTATE, POTASSIUM CHLORIDE, CALCIUM CHLORIDE 600; 310; 30; 20 MG/100ML; MG/100ML; MG/100ML; MG/100ML
INJECTION, SOLUTION INTRAVENOUS CONTINUOUS
Status: DISCONTINUED | OUTPATIENT
Start: 2024-05-20 | End: 2024-05-20

## 2024-05-20 RX ORDER — ONDANSETRON 4 MG/1
4 TABLET, ORALLY DISINTEGRATING ORAL EVERY 8 HOURS PRN
Status: DISCONTINUED | OUTPATIENT
Start: 2024-05-20 | End: 2024-05-20

## 2024-05-20 RX ORDER — SODIUM CHLORIDE 9 MG/ML
INJECTION, SOLUTION INTRAVENOUS PRN
Status: DISCONTINUED | OUTPATIENT
Start: 2024-05-20 | End: 2024-05-20

## 2024-05-20 RX ORDER — DEXTROSE MONOHYDRATE 25 G/50ML
25 INJECTION, SOLUTION INTRAVENOUS ONCE
Status: COMPLETED | OUTPATIENT
Start: 2024-05-20 | End: 2024-05-20

## 2024-05-20 RX ORDER — TRANEXAMIC ACID 100 MG/ML
500 INJECTION, SOLUTION INTRAVENOUS ONCE
Status: COMPLETED | OUTPATIENT
Start: 2024-05-20 | End: 2024-05-20

## 2024-05-20 RX ORDER — GLYCOPYRROLATE 0.2 MG/ML
0.2 INJECTION INTRAMUSCULAR; INTRAVENOUS EVERY 4 HOURS PRN
Status: DISCONTINUED | OUTPATIENT
Start: 2024-05-20 | End: 2024-05-21 | Stop reason: HOSPADM

## 2024-05-20 RX ORDER — MORPHINE SULFATE 2 MG/ML
2 INJECTION, SOLUTION INTRAMUSCULAR; INTRAVENOUS
Status: DISCONTINUED | OUTPATIENT
Start: 2024-05-20 | End: 2024-05-21 | Stop reason: HOSPADM

## 2024-05-20 RX ORDER — CHLORHEXIDINE GLUCONATE ORAL RINSE 1.2 MG/ML
15 SOLUTION DENTAL 2 TIMES DAILY
Status: DISCONTINUED | OUTPATIENT
Start: 2024-05-20 | End: 2024-05-20

## 2024-05-20 RX ORDER — LORAZEPAM 2 MG/ML
1 INJECTION INTRAMUSCULAR
Status: DISCONTINUED | OUTPATIENT
Start: 2024-05-20 | End: 2024-05-21 | Stop reason: HOSPADM

## 2024-05-20 RX ORDER — ACETAMINOPHEN 325 MG/1
650 TABLET ORAL EVERY 4 HOURS PRN
Status: DISCONTINUED | OUTPATIENT
Start: 2024-05-20 | End: 2024-05-20 | Stop reason: SDUPTHER

## 2024-05-20 RX ORDER — CHLORHEXIDINE GLUCONATE ORAL RINSE 1.2 MG/ML
15 SOLUTION DENTAL 2 TIMES DAILY
Status: DISCONTINUED | OUTPATIENT
Start: 2024-05-20 | End: 2024-05-20 | Stop reason: HOSPADM

## 2024-05-20 RX ORDER — POTASSIUM CHLORIDE 20 MEQ/1
40 TABLET, EXTENDED RELEASE ORAL ONCE
Status: DISCONTINUED | OUTPATIENT
Start: 2024-05-20 | End: 2024-05-20 | Stop reason: HOSPADM

## 2024-05-20 RX ORDER — DEXTROSE MONOHYDRATE 25 G/50ML
25 INJECTION, SOLUTION INTRAVENOUS ONCE
Status: COMPLETED | OUTPATIENT
Start: 2024-05-21 | End: 2024-05-20

## 2024-05-20 RX ORDER — POTASSIUM CHLORIDE 29.8 MG/ML
20 INJECTION INTRAVENOUS ONCE
Status: DISCONTINUED | OUTPATIENT
Start: 2024-05-20 | End: 2024-05-20 | Stop reason: SDUPTHER

## 2024-05-20 RX ORDER — ALBUMIN (HUMAN) 12.5 G/50ML
25 SOLUTION INTRAVENOUS EVERY 8 HOURS
Status: DISCONTINUED | OUTPATIENT
Start: 2024-05-20 | End: 2024-05-20

## 2024-05-20 RX ORDER — LORAZEPAM 2 MG/ML
1 INJECTION INTRAMUSCULAR EVERY 6 HOURS PRN
Status: DISCONTINUED | OUTPATIENT
Start: 2024-05-20 | End: 2024-05-20 | Stop reason: HOSPADM

## 2024-05-20 RX ORDER — NICOTINE 21 MG/24HR
1 PATCH, TRANSDERMAL 24 HOURS TRANSDERMAL DAILY
Status: DISCONTINUED | OUTPATIENT
Start: 2024-05-20 | End: 2024-05-20

## 2024-05-20 RX ORDER — ETOMIDATE 2 MG/ML
INJECTION INTRAVENOUS
Status: COMPLETED | OUTPATIENT
Start: 2024-05-20 | End: 2024-05-20

## 2024-05-20 RX ORDER — SODIUM CHLORIDE, SODIUM LACTATE, POTASSIUM CHLORIDE, AND CALCIUM CHLORIDE .6; .31; .03; .02 G/100ML; G/100ML; G/100ML; G/100ML
500 INJECTION, SOLUTION INTRAVENOUS ONCE
Status: DISCONTINUED | OUTPATIENT
Start: 2024-05-20 | End: 2024-05-20 | Stop reason: HOSPADM

## 2024-05-20 RX ORDER — SODIUM CHLORIDE, SODIUM LACTATE, POTASSIUM CHLORIDE, CALCIUM CHLORIDE 600; 310; 30; 20 MG/100ML; MG/100ML; MG/100ML; MG/100ML
INJECTION, SOLUTION INTRAVENOUS CONTINUOUS
Status: DISCONTINUED | OUTPATIENT
Start: 2024-05-20 | End: 2024-05-20 | Stop reason: HOSPADM

## 2024-05-20 RX ORDER — FUROSEMIDE 10 MG/ML
INJECTION INTRAMUSCULAR; INTRAVENOUS
Status: COMPLETED
Start: 2024-05-20 | End: 2024-05-20

## 2024-05-20 RX ORDER — GLUCAGON 1 MG/ML
1 KIT INJECTION PRN
Status: DISCONTINUED | OUTPATIENT
Start: 2024-05-20 | End: 2024-05-20

## 2024-05-20 RX ORDER — POTASSIUM CHLORIDE 7.45 MG/ML
10 INJECTION INTRAVENOUS
Status: DISCONTINUED | OUTPATIENT
Start: 2024-05-20 | End: 2024-05-20 | Stop reason: SDUPTHER

## 2024-05-20 RX ORDER — ACETAMINOPHEN 650 MG/1
650 SUPPOSITORY RECTAL EVERY 6 HOURS PRN
Status: DISCONTINUED | OUTPATIENT
Start: 2024-05-20 | End: 2024-05-20 | Stop reason: SDUPTHER

## 2024-05-20 RX ORDER — POTASSIUM CHLORIDE 29.8 MG/ML
40 INJECTION INTRAVENOUS ONCE
Status: DISCONTINUED | OUTPATIENT
Start: 2024-05-20 | End: 2024-05-20 | Stop reason: HOSPADM

## 2024-05-20 RX ORDER — 0.9 % SODIUM CHLORIDE 0.9 %
1000 INTRAVENOUS SOLUTION INTRAVENOUS ONCE
Status: DISCONTINUED | OUTPATIENT
Start: 2024-05-20 | End: 2024-05-20

## 2024-05-20 RX ORDER — ONDANSETRON 2 MG/ML
4 INJECTION INTRAMUSCULAR; INTRAVENOUS EVERY 6 HOURS PRN
Status: DISCONTINUED | OUTPATIENT
Start: 2024-05-20 | End: 2024-05-21 | Stop reason: HOSPADM

## 2024-05-20 RX ORDER — SODIUM CHLORIDE 0.9 % (FLUSH) 0.9 %
5-40 SYRINGE (ML) INJECTION PRN
Status: DISCONTINUED | OUTPATIENT
Start: 2024-05-20 | End: 2024-05-20

## 2024-05-20 RX ORDER — POTASSIUM CHLORIDE 29.8 MG/ML
20 INJECTION INTRAVENOUS ONCE
Status: COMPLETED | OUTPATIENT
Start: 2024-05-20 | End: 2024-05-20

## 2024-05-20 RX ORDER — DEXTROSE MONOHYDRATE 100 MG/ML
INJECTION, SOLUTION INTRAVENOUS CONTINUOUS
Status: DISCONTINUED | OUTPATIENT
Start: 2024-05-21 | End: 2024-05-20

## 2024-05-20 RX ADMIN — DEXTROSE MONOHYDRATE: 100 INJECTION, SOLUTION INTRAVENOUS at 23:43

## 2024-05-20 RX ADMIN — Medication 25 MCG/HR: at 07:56

## 2024-05-20 RX ADMIN — MORPHINE SULFATE 2 MG: 2 INJECTION, SOLUTION INTRAMUSCULAR; INTRAVENOUS at 00:09

## 2024-05-20 RX ADMIN — SODIUM CHLORIDE, POTASSIUM CHLORIDE, SODIUM LACTATE AND CALCIUM CHLORIDE: 600; 310; 30; 20 INJECTION, SOLUTION INTRAVENOUS at 02:33

## 2024-05-20 RX ADMIN — FUROSEMIDE 20 MG: 10 INJECTION, SOLUTION INTRAMUSCULAR; INTRAVENOUS at 00:53

## 2024-05-20 RX ADMIN — PHENYLEPHRINE HYDROCHLORIDE 300 MCG/MIN: 100 INJECTION INTRAVENOUS at 23:05

## 2024-05-20 RX ADMIN — DEXTROSE MONOHYDRATE 25 G: 25 INJECTION, SOLUTION INTRAVENOUS at 23:26

## 2024-05-20 RX ADMIN — CHLORHEXIDINE GLUCONATE 15 ML: 1.2 RINSE ORAL at 09:27

## 2024-05-20 RX ADMIN — FENTANYL CITRATE 50 MCG/HR: 0.05 INJECTION, SOLUTION INTRAMUSCULAR; INTRAVENOUS at 01:30

## 2024-05-20 RX ADMIN — SODIUM BICARBONATE 50 MEQ: 84 INJECTION INTRAVENOUS at 18:26

## 2024-05-20 RX ADMIN — MINERAL OIL, WHITE PETROLATUM: .03; .94 OINTMENT OPHTHALMIC at 23:31

## 2024-05-20 RX ADMIN — MINERAL OIL, WHITE PETROLATUM: .03; .94 OINTMENT OPHTHALMIC at 09:28

## 2024-05-20 RX ADMIN — SODIUM CHLORIDE, POTASSIUM CHLORIDE, SODIUM LACTATE AND CALCIUM CHLORIDE 500 ML: 600; 310; 30; 20 INJECTION, SOLUTION INTRAVENOUS at 05:45

## 2024-05-20 RX ADMIN — POTASSIUM CHLORIDE 20 MEQ: 29.8 INJECTION, SOLUTION INTRAVENOUS at 01:55

## 2024-05-20 RX ADMIN — DEXMEDETOMIDINE 0.4 MCG/KG/HR: 100 INJECTION, SOLUTION INTRAVENOUS at 07:57

## 2024-05-20 RX ADMIN — TRANEXAMIC ACID 500 MG: 100 INJECTION, SOLUTION INTRAVENOUS at 04:44

## 2024-05-20 RX ADMIN — VASOPRESSIN 0.03 UNITS/MIN: 20 INJECTION INTRAVENOUS at 19:20

## 2024-05-20 RX ADMIN — PHENYLEPHRINE HYDROCHLORIDE 20 MCG/MIN: 10 INJECTION INTRAVENOUS at 05:30

## 2024-05-20 RX ADMIN — PANTOPRAZOLE SODIUM 40 MG: 40 INJECTION, POWDER, FOR SOLUTION INTRAVENOUS at 10:35

## 2024-05-20 RX ADMIN — PANTOPRAZOLE SODIUM 40 MG: 40 INJECTION, POWDER, FOR SOLUTION INTRAVENOUS at 20:58

## 2024-05-20 RX ADMIN — METHYLPREDNISOLONE SODIUM SUCCINATE 40 MG: 40 INJECTION INTRAMUSCULAR; INTRAVENOUS at 02:12

## 2024-05-20 RX ADMIN — ALBUMIN (HUMAN) 25 G: 0.25 INJECTION, SOLUTION INTRAVENOUS at 21:46

## 2024-05-20 RX ADMIN — PHENYLEPHRINE HYDROCHLORIDE 125 MCG/MIN: 100 INJECTION INTRAVENOUS at 07:58

## 2024-05-20 RX ADMIN — CHLORHEXIDINE GLUCONATE 15 ML: 1.2 RINSE ORAL at 20:53

## 2024-05-20 RX ADMIN — ETOMIDATE 20 MG: 2 INJECTION, SOLUTION INTRAVENOUS at 01:11

## 2024-05-20 RX ADMIN — AMIODARONE HYDROCHLORIDE 1 MG/MIN: 50 INJECTION, SOLUTION INTRAVENOUS at 19:47

## 2024-05-20 RX ADMIN — PIPERACILLIN AND TAZOBACTAM 4500 MG: 4; .5 INJECTION, POWDER, FOR SOLUTION INTRAVENOUS at 10:34

## 2024-05-20 RX ADMIN — DEXTROSE MONOHYDRATE 25 G: 25 INJECTION, SOLUTION INTRAVENOUS at 23:44

## 2024-05-20 RX ADMIN — POTASSIUM CHLORIDE 40 MEQ: 29.8 INJECTION, SOLUTION INTRAVENOUS at 04:27

## 2024-05-20 RX ADMIN — ACETAMINOPHEN 650 MG: 325 TABLET ORAL at 18:16

## 2024-05-20 RX ADMIN — VANCOMYCIN HYDROCHLORIDE 1000 MG: 1 INJECTION, POWDER, LYOPHILIZED, FOR SOLUTION INTRAVENOUS at 14:30

## 2024-05-20 RX ADMIN — Medication 0.2 MCG/KG/HR: at 03:05

## 2024-05-20 RX ADMIN — PHENYLEPHRINE HYDROCHLORIDE 10 MCG/MIN: 100 INJECTION INTRAVENOUS at 19:37

## 2024-05-20 RX ADMIN — HYDROCORTISONE SODIUM SUCCINATE 100 MG: 100 INJECTION, POWDER, FOR SOLUTION INTRAMUSCULAR; INTRAVENOUS at 09:28

## 2024-05-20 RX ADMIN — SODIUM CHLORIDE 40 MG: 9 INJECTION, SOLUTION INTRAMUSCULAR; INTRAVENOUS; SUBCUTANEOUS at 02:39

## 2024-05-20 RX ADMIN — SODIUM CHLORIDE, POTASSIUM CHLORIDE, SODIUM LACTATE AND CALCIUM CHLORIDE 1000 ML: 600; 310; 30; 20 INJECTION, SOLUTION INTRAVENOUS at 09:32

## 2024-05-20 RX ADMIN — DEXTROSE MONOHYDRATE 250 ML: 100 INJECTION, SOLUTION INTRAVENOUS at 18:13

## 2024-05-20 RX ADMIN — SODIUM CHLORIDE, PRESERVATIVE FREE 10 ML: 5 INJECTION INTRAVENOUS at 09:33

## 2024-05-20 RX ADMIN — SODIUM CHLORIDE, SODIUM LACTATE, POTASSIUM CHLORIDE, AND CALCIUM CHLORIDE 1000 ML: .6; .31; .03; .02 INJECTION, SOLUTION INTRAVENOUS at 19:47

## 2024-05-20 RX ADMIN — MINERAL OIL, WHITE PETROLATUM: .03; .94 OINTMENT OPHTHALMIC at 14:03

## 2024-05-20 RX ADMIN — LORAZEPAM 1 MG: 2 INJECTION INTRAMUSCULAR; INTRAVENOUS at 01:50

## 2024-05-20 RX ADMIN — ACETAMINOPHEN 650 MG: 650 SUPPOSITORY RECTAL at 05:29

## 2024-05-20 RX ADMIN — DEXTROSE MONOHYDRATE 250 ML: 100 INJECTION, SOLUTION INTRAVENOUS at 23:10

## 2024-05-20 RX ADMIN — Medication 2 MG/HR: at 09:29

## 2024-05-20 RX ADMIN — MAGNESIUM SULFATE HEPTAHYDRATE 2000 MG: 40 INJECTION, SOLUTION INTRAVENOUS at 04:28

## 2024-05-20 RX ADMIN — HYDROCORTISONE SODIUM SUCCINATE 100 MG: 100 INJECTION, POWDER, FOR SOLUTION INTRAMUSCULAR; INTRAVENOUS at 18:11

## 2024-05-20 RX ADMIN — POTASSIUM BICARBONATE 40 MEQ: 782 TABLET, EFFERVESCENT ORAL at 14:03

## 2024-05-20 RX ADMIN — Medication 5 MCG/MIN: at 18:01

## 2024-05-20 RX ADMIN — CEFEPIME 2000 MG: 2 INJECTION, POWDER, FOR SOLUTION INTRAVENOUS at 00:14

## 2024-05-20 RX ADMIN — SODIUM CHLORIDE, POTASSIUM CHLORIDE, SODIUM LACTATE AND CALCIUM CHLORIDE 1000 ML: 600; 310; 30; 20 INJECTION, SOLUTION INTRAVENOUS at 10:52

## 2024-05-20 RX ADMIN — SODIUM CHLORIDE, POTASSIUM CHLORIDE, SODIUM LACTATE AND CALCIUM CHLORIDE: 600; 310; 30; 20 INJECTION, SOLUTION INTRAVENOUS at 18:34

## 2024-05-20 RX ADMIN — MICAFUNGIN SODIUM 100 MG: 100 INJECTION, POWDER, LYOPHILIZED, FOR SOLUTION INTRAVENOUS at 10:25

## 2024-05-20 RX ADMIN — FILGRASTIM-AAFI 480 MCG: 480 INJECTION, SOLUTION SUBCUTANEOUS at 13:47

## 2024-05-20 RX ADMIN — MINERAL OIL, WHITE PETROLATUM: .03; .94 OINTMENT OPHTHALMIC at 18:03

## 2024-05-20 RX ADMIN — PIPERACILLIN AND TAZOBACTAM 4500 MG: 4; .5 INJECTION, POWDER, FOR SOLUTION INTRAVENOUS at 18:15

## 2024-05-20 RX ADMIN — PHENYLEPHRINE HYDROCHLORIDE 200 MCG/MIN: 100 INJECTION INTRAVENOUS at 14:04

## 2024-05-20 ASSESSMENT — PAIN DESCRIPTION - LOCATION: LOCATION: GENERALIZED

## 2024-05-20 ASSESSMENT — PULMONARY FUNCTION TESTS
PIF_VALUE: 19
PIF_VALUE: 15
PIF_VALUE: 20
PIF_VALUE: 21
PIF_VALUE: 27
PIF_VALUE: 18
PIF_VALUE: 17
PIF_VALUE: 19
PIF_VALUE: 28
PIF_VALUE: 19
PIF_VALUE: 23
PIF_VALUE: 28
PIF_VALUE: 18
PIF_VALUE: 15
PIF_VALUE: 19
PIF_VALUE: 29
PIF_VALUE: 19
PIF_VALUE: 38
PIF_VALUE: 21
PIF_VALUE: 19
PIF_VALUE: 29
PIF_VALUE: 21
PIF_VALUE: 28
PIF_VALUE: 28
PIF_VALUE: 30
PIF_VALUE: 15
PIF_VALUE: 22
PIF_VALUE: 21
PIF_VALUE: 21
PIF_VALUE: 22
PIF_VALUE: 21
PIF_VALUE: 30
PIF_VALUE: 18

## 2024-05-20 ASSESSMENT — PAIN SCALES - GENERAL
PAINLEVEL_OUTOF10: 0
PAINLEVEL_OUTOF10: 0
PAINLEVEL_OUTOF10: 10
PAINLEVEL_OUTOF10: 0

## 2024-05-20 NOTE — PROGRESS NOTES
1930 RRT: Cardioverted 200J  1935: Cardioverted 360J  1936: Epi 5 mcg  1937: Amio Bolus 150   1938: Christopher Started  1946: Amio Drip @ 33.3 mL/hr  1949: LR bolus  1950: RRT end

## 2024-05-20 NOTE — PROGRESS NOTES
Pharmacy Consultation Note  (Antibiotic Dosing and Monitoring)    Initial consult date: 05/19/2024  Consulting physician/provider: Beni  Drug: Vancomycin  Indication: HAP    Age/  Gender Height Weight IBW  Allergy Information   68 y.o./female 180.3 cm (5' 10.98\") 60.2 kg (132 lb 11.5 oz)     Ideal body weight: 70.8 kg (156 lb 0.1 oz)   Iodine and Evista [raloxifene hcl]      Renal Function:  Recent Labs     05/19/24  1313   BUN 9   CREATININE 0.5     No intake or output data in the 24 hours ending 05/19/24 2247    Vancomycin Monitoring:  Trough:  No results for input(s): \"VANCOTROUGH\" in the last 72 hours.  Random:  No results for input(s): \"VANCORANDOM\" in the last 72 hours.    Vancomycin Administration Times:  Recent vancomycin administrations                     vancomycin (VANCOCIN) 1,250 mg in sodium chloride 0.9 % 250 mL IVPB (mg) 1,250 mg New Bag 05/19/24 1950                    Assessment:  Patient is a 68 y.o. female who has been initiated on vancomycin  Estimated Creatinine Clearance: 107 mL/min (based on SCr of 0.5 mg/dL).  To dose vancomycin, pharmacy will be utilizing [employer] calculation software for goal AUC/LUNA 400-600 mg/L-hr (predicted AUC/LUNA = 508, Tr =11.9 mcg/mL)    Plan:  Will trial vancomycin 1250 mg IV every 12 hours  Will check vancomycin levels when appropriate  Will continue to monitor renal function   Pharmacy to follow      Jareth Mix RPH 5/19/2024 10:47 PM    VALERI: [phone]  SEY: [phone]  [facility]: [phone]

## 2024-05-20 NOTE — PROGRESS NOTES
[facility] here to transport pt to [facility]- family at bedside & aware. Report called  & given to Sammi at MICU

## 2024-05-20 NOTE — PROGRESS NOTES
Stage  CI HR MAP TPRI SVI   Baseline 2.8 108 70  26   Challenge 3.1 118 72  29   Result (%?) 11.2 9.9 2.9  13.1

## 2024-05-20 NOTE — PROGRESS NOTES
Stage  CI HR MAP TPRI SVI   Baseline 1.8 117   15   Challenge 2.2 116   20   Result (%?) 24.3 -0.3   27.4

## 2024-05-20 NOTE — SIGNIFICANT EVENT
Dr Bazan initiated transfer to [facility], for higher level of Care.  Patient was picked up by [facility] at 0600, Patient is intubated and sedated. Left hemodynamically stable.  Family at bedside.

## 2024-05-20 NOTE — PROGRESS NOTES
05/20/24 1654   Patient Observation   Pulse (!) 111   SpO2 98 %   Vent Information   Ventilator Day(s) 1   Ventilator -37   Vent Mode (S)  AC/PRVC   $Ventilation $Initial Day   Ventilator Settings   Vt (Set, mL) (S)  500 mL   Resp Rate (Set) (S)  16 bpm   PEEP/CPAP (cmH2O) 10   FiO2  100 %   Insp Time (sec) 0.64 sec   Vent Patient Data (Readings)   Vt (Measured) 536 mL   Peak Inspiratory Pressure (cmH2O) 38 cmH2O   Rate Measured 18 br/min   Minute Volume (L/min) 9.81 Liters   Mean Airway Pressure (cmH2O) 15 cmH20   Plateau Pressure (cm H2O) 25 cm H2O   Driving Pressure 15   I:E Ratio 1:4.30   Flow Sensitivity 3 L/min   Static Compliance (L/cm H2O) 32   I Time/ I Time % 0 s   Backup Apnea On   Backup Rate 10 Breaths Per Minute   Backup Vt 350   Vent Alarm Settings   High Pressure (cmH2O) 40 cmH2O   ETT    Placement Date/Time: 05/20/24 0115   Present on Admission/Arrival: No  Placed By: RT  Placement Verified By: Auscultation;Capnometry  Preoxygenation: Yes  Mask Ventilation: Ventilated by mask (1)  Technique: Stylet;Video laryngoscopy  Airway Type: Cuf...   Secured At 23 cm   Measured From Lips

## 2024-05-20 NOTE — H&P
ordered.  # Code Status: Full code  # DVT Prophylaxis : As ordered on chart  The patient at bedside was counseled about clinical status, laboratory/imaging results, diagnoses, medication side effects, risk, and treatment plan, all questions were answered to patient's satisfaction and verbalized understanding  SIGNATURE: Malik Bazan MD PATIENT NAME: Marina Green   CONTACT #: Hospitalist on call MRN: [id number]     Disclaimer: Portions of this note may have been generated using Dragon voice recognition software. Reasonable efforts were made to correct any dictation errors that resulted due to the programming of this software but some may still be present.

## 2024-05-20 NOTE — PROGRESS NOTES
Malik Hendrix MD,    Your patient is on a medication that requires a renal dose adjustment.    Renal Function Assessment:    Date Body Weight IBW  Adjusted BW SCr  CrCl Dialysis status   5/19/2024 60.2 kg (132 lb 11.5 oz)  Ideal body weight: 70.8 kg (156 lb 1.4 oz) Serum creatinine: 0.5 mg/dL 05/19/24 1313  Estimated creatinine clearance: 99 mL/min N/a       Pharmacy has renally dose-adjusted the following medication(s):    Date Original Order Renally Adjusted Order   5/19/2024 Cefepime 1000 mg Q8H Cefepime 2000 mg Q8H       These changes were made per protocol according to the [facility] Renal Function-Based Dose Adjustments Policy    *Please note this dose may need readjusted if your patient's renal function significantly improves.    Please contact pharmacy with any questions regarding these changes.    Jareth Mix RPH  5/19/2024  10:15 PM

## 2024-05-20 NOTE — PROGRESS NOTES
Pharmacy Consultation Note  (Antibiotic Dosing and Monitoring)    Initial consult date: 5/20/24  Consulting physician/provider: Dr. Logan  Drug: Vancomycin  Indication: VAP    Age/  Gender Height Weight IBW  Allergy Information   68 y.o./female 180.3 cm (5' 11\") 62.1 kg (137 lb)     Ideal body weight: 70.8 kg (156 lb 1.4 oz)   Iodine and Evista [raloxifene hcl]      Renal Function:  Recent Labs     05/20/24  0115 05/20/24  0328 05/20/24  0809   BUN 10 11 13   CREATININE 0.5 0.6 0.7       Intake/Output Summary (Last 24 hours) at 5/20/2024 1231  Last data filed at 5/20/2024 1212  Gross per 24 hour   Intake 2256.04 ml   Output 175 ml   Net 2081.04 ml       Vancomycin Monitoring:  Trough:  No results for input(s): \"VANCOTROUGH\" in the last 72 hours.  Random:  No results for input(s): \"VANCORANDOM\" in the last 72 hours.    Vancomycin Administration Times:  Recent vancomycin administrations                     vancomycin (VANCOCIN) 1,250 mg in sodium chloride 0.9 % 250 mL IVPB (mg) 1,250 mg New Bag 05/19/24 1950                    Assessment:  Patient is a 68 y.o. female who has been initiated on vancomycin  Estimated Creatinine Clearance: 73 mL/min (based on SCr of 0.7 mg/dL).  To dose vancomycin, pharmacy will be utilizing  AUC/LUNA 400-600 and vancomycin trough of 15-20 mcg/mL    Plan:  Will start vancomycin 1000 mg IV every 24 hours  Will check vancomycin levels when appropriate  Will continue to monitor renal function   Pharmacy to follow      Thank you for the consult,     Hay Falcon, PharmD, BCPS, BCCCP 5/20/2024 12:32 PM

## 2024-05-20 NOTE — PROGRESS NOTES
Subjective:    The patient is intubated and sedated. Family at the  bedside. RN present in room, states no changes in condition.    Objective:    BP (!) 81/55   Pulse (!) 104   Temp 100.2 °F (37.9 °C) (Esophageal)   Resp 20   Ht 1.803 m (5' 11\")   Wt 62.1 kg (137 lb)   SpO2 100%   BMI 19.11 kg/m²     General: Intubated and sedated  HEENT: No thrush or mucositis, EOMI, PERRLA  Heart:  RRR, no murmurs, gallops, or rubs.  Lungs:  CTA bilaterally, no wheeze, rales or rhonchi  Abd: BS present, nontender, nondistended, no masses  Extrem:  No clubbing, cyanosis, or edema  Lymphatics: No palpable adenopathy in cervical and supraclavicular regions  Skin: Intact, no petechia or purpura    CBC with Differential:    Lab Results   Component Value Date/Time    WBC 0.1 05/20/2024 08:09 AM    RBC 3.22 05/20/2024 08:09 AM    HGB 10.1 05/20/2024 08:09 AM    HCT 30.3 05/20/2024 08:09 AM    PLT 16 05/20/2024 01:15 AM    MCV 94.1 05/20/2024 08:09 AM    MCH 31.4 05/20/2024 08:09 AM    MCHC 33.3 05/20/2024 08:09 AM    RDW 14.0 05/20/2024 08:09 AM    METASPCT 1 05/20/2024 08:09 AM    LYMPHOPCT 56 05/20/2024 08:09 AM    MONOPCT 14 05/20/2024 08:09 AM    MYELOPCT 1 05/20/2024 08:09 AM    EOSPCT 2 05/20/2024 08:09 AM    BASOPCT 0 05/20/2024 08:09 AM    MONOSABS 0.01 05/20/2024 08:09 AM    LYMPHSABS 1.49 02/17/2014 09:15 AM    EOSABS 0.00 05/20/2024 08:09 AM    BASOSABS 0.00 05/20/2024 08:09 AM     CMP:    Lab Results   Component Value Date/Time     05/20/2024 08:09 AM    K 3.6 05/20/2024 08:09 AM     05/20/2024 08:09 AM    CO2 22 05/20/2024 08:09 AM    BUN 13 05/20/2024 08:09 AM    CREATININE 0.7 05/20/2024 08:09 AM    GFRAA >60 03/21/2022 11:25 AM    LABGLOM 87 05/20/2024 08:09 AM    LABGLOM >90 04/16/2024 08:15 AM    GLUCOSE 111 05/20/2024 08:09 AM    GLUCOSE 97 02/13/2012 10:20 AM    CALCIUM 8.1 05/20/2024 08:09 AM    BILITOT 0.7 05/20/2024 08:09 AM    ALKPHOS 75 05/20/2024 08:09 AM    AST 39 05/20/2024 08:09 AM    ALT

## 2024-05-20 NOTE — PROGRESS NOTES
Stage  CI HR MAP TPRI SVI   Baseline 3.0 105   29   Challenge 3.3 106   31   Result (%?) 10.3 0.2   10.1

## 2024-05-20 NOTE — CONSULTS
artery disease)     Carotid artery stenosis     Chronic back pain     Chronic obstructive pulmonary disease, unspecified COPD type (HCC) 2022    COPD (chronic obstructive pulmonary disease) (HCC)     Depression     GERD (gastroesophageal reflux disease)     Hyperlipidemia     Hypertension     Hyperthyroidism     Mitochondrial disease (HCC)     Myocardial infarction (HCC) 2022    Last Assessment & Plan:   Formatting of this note might be different from the original.  Assessment:  had stent placed in RCA    Need for vaccination against Streptococcus pneumoniae 2022    Neuropathy       Past Surgical History:   Procedure Laterality Date    ABDOMINAL EXPLORATION SURGERY      APPENDECTOMY      BREAST BIOPSY      CHOLECYSTECTOMY      CORONARY ANGIOPLASTY WITH STENT PLACEMENT      CT NEEDLE BIOPSY LIVER PERCUTANEOUS  2024    CT NEEDLE BIOPSY LIVER PERCUTANEOUS 2024 Timothy Bradford MD [facility]    HYSTERECTOMY (CERVIX STATUS UNKNOWN)      NECK SURGERY      PAIN MANAGEMENT PROCEDURE N/A 2022    LUMBAR EPIDURAL STEROID INJECTION L3-4 WITH 80 DEPO performed by Austin Medrano MD at [facility] OR    PARTIAL HYSTERECTOMY (CERVIX NOT REMOVED)      PORT SURGERY N/A 2024    MEDIPORT INSERTION performed by Omega Gu MD at [facility] OR    TONSILLECTOMY         Family History:   Family History   Problem Relation Age of Onset    Early Death Mother     Heart Disease Mother     Other Mother 29        blood clot      Colon Cancer Father 84    Diabetes Maternal Uncle     Diabetes Maternal Grandmother         Allergies:         Iodine and Evista [raloxifene hcl]    Social history:  Social History     Socioeconomic History    Marital status:      Spouse name: Not on file    Number of children: Not on file    Years of education: Not on file    Highest education level: Not on file   Occupational History    Not on file   Tobacco Use    Smoking status: Former     Current

## 2024-05-20 NOTE — PROGRESS NOTES
Pt becoming very anxious with labored resps. Large amounts of hemoptysis noted.  Sats dropping into the 80's. Pt placed on 15L HFNC.  HR in 140's. 5mg of metoprolol given. Rapid response called at this time. Daughter at bedside.

## 2024-05-20 NOTE — CODE DOCUMENTATION
RRT called by primary RN due to tachycardia, episode of coughing with bloody sputum, and increased work of breathing. Upon arrival, patient tachypneic. Diffuse crackles throughout. -150. Dr. Bazan and respiratory at bedside. Patient given lasix by this RN per verbal order and placed on bipap. Patient became bradycardic with shallow breathing. Given etomidate by this RN. Intubated by respiratory. Vitals and intubation documented per narrator. Azra, ICU, at bedside to assume care until moved to ICU.

## 2024-05-20 NOTE — PLAN OF CARE
Called at bedside for SVT in the 200-220s bpm, MAP 20-34 mmHg. Patient on Levophed drip 100 mcg/min, vasopressin at 0.03 units/min, transitioned from phenylephrine drip earlier. She was cardioverted at 200J and 360 J at 1930 and 1935, respectively. Epinephrine drip started. Called Dr. Archibald and said to turn off Levo and place her back on phenylephrine drip, give another bolus of fluid and give amiodarone bolus and drip. Heart rate came back down to 130-140s, sinus tachycardia. MAP improved to 66-67 mmHg. Dr. Archibald updated. Her daughters were at bedside and updated as well.

## 2024-05-20 NOTE — PROGRESS NOTES
Spoke with daughter, Shayna, and ex- at bedside.     Ex- states they're legally  and that they have three adult children.     Informed them at bedside that all three children are decision makers.

## 2024-05-20 NOTE — PROGRESS NOTES
4 Eyes Skin Assessment     NAME:  Marina Green  YOB: 1955  MEDICAL RECORD NUMBER:  [id number]    The patient is being assessed for  Admission    I agree that at least one RN has performed a thorough Head to Toe Skin Assessment on the patient. ALL assessment sites listed below have been assessed.      Areas assessed by both nurses:    Head, Face, Ears, Shoulders, Back, Chest, Arms, Elbows, Hands, Sacrum. Buttock, Coccyx, Ischium, Legs. Feet and Heels, and Under Medical Devices     Blister left shin   Heels red / boggy   2 red areas on left hip  Mottled BLE  Generalized bruising  Coccyx red / blanchable   Petechia bilateral arms         Does the Patient have a Wound? No noted wound(s)       [place] Prevention initiated by RN: Yes  Wound Care Orders initiated by RN: No    Pressure Injury (Stage 3,4, Unstageable, DTI, NWPT, and Complex wounds) if present, place Wound referral order by RN under : No    New Ostomies, if present place, Ostomy referral order under : No     Nurse 1 eSignature: Electronically signed by EDNA DUTTA RN on 5/20/24 at 2:59 AM EDT    **SHARE this note so that the co-signing nurse can place an eSignature**    Nurse 2 eSignature: Electronically signed by Azra Johnson RN on 5/20/24 at 3:57 AM EDT

## 2024-05-20 NOTE — PROGRESS NOTES
Patient reaches for ETT and all lines when restraints removed. Bilateral wrist restraints applied. Daughter, Shayna, at bedside and aware. Patient educated on discontinuation criteria.

## 2024-05-20 NOTE — H&P
This report was transcribed using voice recognition software. Every effort was made to ensure accuracy; however, inadvertent computerized transcription errors may be present.

## 2024-05-20 NOTE — PROGRESS NOTES
Dr. Kerr notified of frequent ectopy on bedside monitors. Trigeminy and bigeminy noted or frequent pvcs.

## 2024-05-20 NOTE — PROGRESS NOTES
4 Eyes Skin Assessment     NAME:  Marina Green  YOB: 1955  MEDICAL RECORD NUMBER:  [id number]    The patient is being assessed for  Admission    I agree that at least one RN has performed a thorough Head to Toe Skin Assessment on the patient. ALL assessment sites listed below have been assessed.      Areas assessed by both nurses:    Head, Face, Ears, Shoulders, Back, Chest, Arms, Elbows, Hands, Sacrum. Buttock, Coccyx, Ischium, Legs. Feet and Heels, and Under Medical Devices         Does the Patient have a Wound? Yes wound(s) were present on assessment. LDA wound assessment was Initiated and completed by RN     Blanchable redness to bilateral elbows/bilateral heels/ and coccyx  Non blanchable redness to lower left hip 7X4  Non blanchable redness to upper left hip 10X5  Non blanchable redness to left shoulder 3X3      [place] Prevention initiated by RN: Yes  Wound Care Orders initiated by RN: No    Pressure Injury (Stage 3,4, Unstageable, DTI, NWPT, and Complex wounds) if present, place Wound referral order by RN under : No    New Ostomies, if present place, Ostomy referral order under : No     Nurse 1 eSignature: Electronically signed by Jennifer Reina RN on 5/20/24 at 9:59 AM EDT    **SHARE this note so that the co-signing nurse can place an eSignature**    Nurse 2 eSignature: Electronically signed by Ledy Stiles RN on 5/20/24 at 10:05 AM EDT

## 2024-05-20 NOTE — PROGRESS NOTES
05/20/24 1820   Patient Observation   Pulse (!) 124   SpO2 96 %   Vent Information   Vent Mode AC/PRVC   Ventilator Settings   Vt (Set, mL) 500 mL   Resp Rate (Set) 16 bpm   PEEP/CPAP (cmH2O) (S)  12   FiO2  100 %   Vent Patient Data (Readings)   Vt (Measured) 508 mL   Peak Inspiratory Pressure (cmH2O) 27 cmH2O   Rate Measured 20 br/min   Minute Volume (L/min) 10.8 Liters   Mean Airway Pressure (cmH2O) 15 cmH20   Plateau Pressure (cm H2O) 24 cm H2O   Driving Pressure 12   I:E Ratio 1:3.60   I Time/ I Time % 0 s   Vent Alarm Settings   High Pressure (cmH2O) 40 cmH2O

## 2024-05-21 VITALS
SYSTOLIC BLOOD PRESSURE: 80 MMHG | OXYGEN SATURATION: 92 % | BODY MASS INDEX: 19.18 KG/M2 | TEMPERATURE: 101.3 F | HEART RATE: 115 BPM | HEIGHT: 71 IN | RESPIRATION RATE: 23 BRPM | DIASTOLIC BLOOD PRESSURE: 64 MMHG | WEIGHT: 137 LBS

## 2024-05-21 LAB
ABO/RH: NORMAL
ANTIBODY SCREEN: NEGATIVE
ARM BAND NUMBER: NORMAL
BLOOD BANK BLOOD PRODUCT EXPIRATION DATE: NORMAL
BLOOD BANK DISPENSE STATUS: NORMAL
BLOOD BANK ISBT PRODUCT BLOOD TYPE: 5100
BLOOD BANK ISBT PRODUCT BLOOD TYPE: 6200
BLOOD BANK ISBT PRODUCT BLOOD TYPE: 6200
BLOOD BANK PRODUCT CODE: NORMAL
BLOOD BANK SAMPLE EXPIRATION: NORMAL
BLOOD BANK UNIT TYPE AND RH: NORMAL
BPU ID: NORMAL
COMPONENT: NORMAL
CROSSMATCH RESULT: NORMAL
L PNEUMO1 AG UR QL IA.RAPID: NEGATIVE
MICROORGANISM SPEC CULT: NORMAL
MICROORGANISM SPEC CULT: NORMAL
MICROORGANISM/AGENT SPEC: NORMAL
SERVICE CMNT-IMP: NORMAL
SPECIMEN DESCRIPTION: NORMAL
SPECIMEN DESCRIPTION: NORMAL
TRANSFUSION STATUS: NORMAL
UNIT DIVISION: 0
UNIT ISSUE DATE/TIME: NORMAL

## 2024-05-21 PROCEDURE — 99239 HOSP IP/OBS DSCHRG MGMT >30: CPT | Performed by: STUDENT IN AN ORGANIZED HEALTH CARE EDUCATION/TRAINING PROGRAM

## 2024-05-21 PROCEDURE — 6360000002 HC RX W HCPCS: Performed by: INTERNAL MEDICINE

## 2024-05-21 PROCEDURE — 94003 VENT MGMT INPAT SUBQ DAY: CPT

## 2024-05-21 RX ADMIN — MORPHINE SULFATE 2 MG: 2 INJECTION, SOLUTION INTRAMUSCULAR; INTRAVENOUS at 01:04

## 2024-05-21 RX ADMIN — LORAZEPAM 1 MG: 2 INJECTION INTRAMUSCULAR; INTRAVENOUS at 01:04

## 2024-05-21 ASSESSMENT — PULMONARY FUNCTION TESTS
PIF_VALUE: 32
PIF_VALUE: 31

## 2024-05-21 NOTE — PROGRESS NOTES
Drips administered within an hour of expiration include:  Amioderone 33.3 mL  Versed 8 mL  Levophed 17 mL  Christopher-synephrine 90 mL  Vasopressin 9 mL

## 2024-05-21 NOTE — SIGNIFICANT EVENT
satisfaction.     Objective:   Vital signs: Temp: 100.6 /BP: 54/34 /RR: 19, on MV /HR: 200-220s  Initial Condition:  Conscious   [] Yes  [] No. sedated     Breathing [] Yes  [] No, vented     Pulse  [x] Yes  [] No    Airway:   [] Open/ Clear     Intervention: [] None  [] Pooled secretions     [] Suctioned  [] Stridor      [] Intubation    Lungs:   [] Symmetrical chest rise/ CTABL Intervention: [] None  [] Use of accessory muscles    [] NIV (CPAP/BiPAP)  [] Cyanosis      [] Nasal Oxygen/Mask  [] Wheezing       [] ABG             [] CXR  [] Other:     Circulation:   Rhythm:  [] Sinus [x] Other: SVT  Intervention: [] None            [] IV Access  [] Peripheral              [] Central            [x] EKG            [] Cardioversion: synchronized  cardioversion at 200 and 360 J (1930, 1935, respectively)            [] Defibrillation     Capillary Refill:  [] > 2 seconds [x] < 2 seconds    Neurologic:   [] NIHSS      [] Pupillary Response:     Response to pain:   [] Yes  [] No  Follow commands:  [] Yes  [] No  Facial asymmetry:  [] Yes  [] No  Motor strength:  [] Equal  [] Focal deficit:   Diagnostic Test:  Blood Sugar:    EKG:    SVT  ABG:      Lab Results   Component Value Date/Time    PH 7.320 05/20/2024 06:10 PM    PCO2 34.0 05/20/2024 06:10 PM    PO2 81.7 05/20/2024 06:10 PM    HCO3 17.1 05/20/2024 06:10 PM    O2SAT 94.9 05/20/2024 06:10 PM     Medication(s) Given:  []  Narcan (Naloxone)   []  Romazicon (Flumazenil)    []  Breathing Treatment    []  Steroids (Prednisone, Solu-Medrol)  []  Adenosine  [] Cardiac Medicines:     Infusion(s):   [] Normal Saline    Amount: 1000 cc bolus      [x] Lactate Ringers      [] Other:     Imaging:   [] CXR:   [] Normal         [] Pneumothorax         [] Pulmonary Edema  [] Infiltrate          [] CT Head  [] Normal          [] ICB          [] SAH          [] Other:     Laboratory Tests:   CBC with Differential:    Lab Results   Component Value Date/Time    WBC 0.3 05/20/2024 05:09 PM

## 2024-05-21 NOTE — DEATH NOTES
Expiration Note:    Called to bedside to pronounce death after patient was noted to be in asystole at 0121, 5/21/2024.   NO pupillary, corneal, doll's eye or gag reflex noted.  NO heart sounds or pulse noted.  NO Chest rise or Lung sounds noted.   NO Response to painful stimuli  Time of death declared at 0121 on May 21, 2024.      Claudia Kline MD MD PGY-3  5/21/2024  1:34 AM

## 2024-05-21 NOTE — PROGRESS NOTES
Pt was removed off of all drips. 2 mg Morphine and 1 mg ativan was administered and pt extubated. Family at bed side.

## 2024-05-21 NOTE — PLAN OF CARE
Updated family on repeat lab results, shock liver, IVONNE and hypoglycemia. Still on max dose of vasopressors. Talked with Jillian Corral and their brother and other family members and they have decided to change code status to DNR-CC. I provided information about DNR-CC and what it entails. They verbalized understanding. Questions were answered to their satisfaction. Updated Dr. Vergara of patient`s condition and code status change. Comfort care orders placed.

## 2024-05-21 NOTE — PROGRESS NOTES
Patient's blood glucose was too low for the glucometer to read. Gave 10% dextrose bolus. After 15 mins the glucometer still could not read glucose. Gave 50% Dextrose bolus x2. Glucose remained <40.

## 2024-05-21 NOTE — DISCHARGE SUMMARY
Hospital Medicine Discharge Summary    Patient ID: Marina Green      Patient's PCP: Marilynn Schafer, APRN - CNP    Admitting Physician: Iliana Bernard MD  Discharge Physician: Iliana Bernard MD     Admit Date: 2024     Disposition:     Discharge Diagnoses:   Principal Problem:    Respiratory failure (HCC)  Active Problems:    Pancreatic cancer metastasized to liver (HCC)    Portal vein thrombosis    Acute hypoxic respiratory failure (HCC)    Palliative care encounter    Splenic vein thrombosis  Resolved Problems:    * No resolved hospital problems. *      Date of Death:1:21 am  Time of Death: May 21, 2024.     Immediate Cause of Death:cardiogenic shock  Underlying Conditions:acute hypoxic respiratory failure  Other Contributing Conditions: pancreatic cancer with metastasis    Hospital Course: 68 y.o. year old female  who  has a past medical history of Anxiety, CAD (coronary artery disease), Carotid artery stenosis, Chronic back pain, Chronic obstructive pulmonary disease, unspecified COPD type (HCC), COPD (chronic obstructive pulmonary disease) (HCC), Depression, GERD (gastroesophageal reflux disease), Hyperlipidemia, Hypertension, Hyperthyroidism, Mitochondrial disease (HCC), Myocardial infarction (HCC), Need for vaccination against Streptococcus pneumoniae, Neuropathy, and Splenic vein thrombosis.       Presented to [facility]  with SOB, diarrhea, abdominal pain  for last few days prior to arrival to the hospital.  Associated with abdominal pain and generalized weakness. While is on the floor, patient has episode of hemoptysis and and had worsening SOB and respiratory failure and patient was intubated and  transferred to [facility]  ICU ; patient has h/o carcinoma the pancreas, metastatic involvement of the liver, has received at least a few chemotherapy treatments ; . CT of Pelvis- Diffuse wall thickening and inflammation throughout small and large bowel loops consistent with

## 2024-05-21 NOTE — PLAN OF CARE
Problem: Safety - Medical Restraint  Goal: Remains free of injury from restraints (Restraint for Interference with Medical Device)  Description: INTERVENTIONS:  1. Determine that other, less restrictive measures have been tried or would not be effective before applying the restraint  2. Evaluate the patient's condition at the time of restraint application  3. Inform patient/family regarding the reason for restraint  4. Q2H: Monitor safety, psychosocial status, comfort, nutrition and hydration  5/20/2024 2230 by Sammi Go RN  Outcome: Progressing  Flowsheets  Taken 5/20/2024 2200  Remains free of injury from restraints (restraint for interference with medical device): Every 2 hours: Monitor safety, psychosocial status, comfort, nutrition and hydration  Taken 5/20/2024 2000  Remains free of injury from restraints (restraint for interference with medical device): Every 2 hours: Monitor safety, psychosocial status, comfort, nutrition and hydration     Problem: Safety - Adult  Goal: Free from fall injury  Outcome: Progressing     Problem: Pain  Goal: Verbalizes/displays adequate comfort level or baseline comfort level  Outcome: Progressing     Problem: Skin/Tissue Integrity  Goal: Absence of new skin breakdown  Description: 1.  Monitor for areas of redness and/or skin breakdown  2.  Assess vascular access sites hourly  3.  Every 4-6 hours minimum:  Change oxygen saturation probe site  4.  Every 4-6 hours:  If on nasal continuous positive airway pressure, respiratory therapy assess nares and determine need for appliance change or resting period.  Outcome: Progressing     Problem: ABCDS Injury Assessment  Goal: Absence of physical injury  Outcome: Progressing  Flowsheets (Taken 5/20/2024 2227)  Absence of Physical Injury: Implement safety measures based on patient assessment

## 2024-05-22 LAB
1,3 BETA GLUCAN SER-MCNC: <31 PG/ML
1,3 BETA-D-GLUCAN INTERP: NEGATIVE
ACB COMPLEX DNA BLD POS QL NAA+NON-PROBE: NOT DETECTED
B FRAGILIS DNA BLD POS QL NAA+NON-PROBE: NOT DETECTED
BIOFIRE TEST COMMENT: ABNORMAL
BLACTX-M ISLT/SPM QL: NOT DETECTED
BLAIMP ISLT/SPM QL: NOT DETECTED
BLAKPC ISLT/SPM QL: NOT DETECTED
BLAOXA-48-LIKE ISLT/SPM QL: NOT DETECTED
BLAVIM ISLT/SPM QL: NOT DETECTED
C ALBICANS DNA BLD POS QL NAA+NON-PROBE: NOT DETECTED
C AURIS DNA BLD POS QL NAA+NON-PROBE: NOT DETECTED
C GATTII+NEOFOR DNA BLD POS QL NAA+N-PRB: NOT DETECTED
C GLABRATA DNA BLD POS QL NAA+NON-PROBE: NOT DETECTED
C KRUSEI DNA BLD POS QL NAA+NON-PROBE: NOT DETECTED
C PARAP DNA BLD POS QL NAA+NON-PROBE: NOT DETECTED
C TROPICLS DNA BLD POS QL NAA+NON-PROBE: NOT DETECTED
COLISTIN RES MCR-1 ISLT/SPM QL: NOT DETECTED
E CLOAC COMP DNA BLD POS NAA+NON-PROBE: NOT DETECTED
E COLI DNA BLD POS QL NAA+NON-PROBE: NOT DETECTED
E FAECALIS DNA BLD POS QL NAA+NON-PROBE: NOT DETECTED
E FAECIUM DNA BLD POS QL NAA+NON-PROBE: NOT DETECTED
EKG ATRIAL RATE: 357 BPM
EKG Q-T INTERVAL: 248 MS
EKG QRS DURATION: 76 MS
EKG QTC CALCULATION (BAZETT): 426 MS
EKG R AXIS: 75 DEGREES
EKG T AXIS: -87 DEGREES
EKG VENTRICULAR RATE: 178 BPM
ENTEROBACTERALES DNA BLD POS NAA+N-PRB: DETECTED
GP B STREP DNA BLD POS QL NAA+NON-PROBE: NOT DETECTED
HAEM INFLU DNA BLD POS QL NAA+NON-PROBE: NOT DETECTED
K OXYTOCA DNA BLD POS QL NAA+NON-PROBE: NOT DETECTED
KLEBSIELLA SP DNA BLD POS QL NAA+NON-PRB: DETECTED
KLEBSIELLA SP DNA BLD POS QL NAA+NON-PRB: NOT DETECTED
L MONOCYTOG DNA BLD POS QL NAA+NON-PROBE: NOT DETECTED
MICROORGANISM SPEC CULT: ABNORMAL
MICROORGANISM/AGENT SPEC: ABNORMAL
N MEN DNA BLD POS QL NAA+NON-PROBE: NOT DETECTED
P AERUGINOSA DNA BLD POS NAA+NON-PROBE: NOT DETECTED
PROTEUS SP DNA BLD POS QL NAA+NON-PROBE: NOT DETECTED
RESISTANT GENE NDM BY PCR: NOT DETECTED
S AUREUS DNA BLD POS QL NAA+NON-PROBE: NOT DETECTED
S AUREUS+CONS DNA BLD POS NAA+NON-PROBE: NOT DETECTED
S EPIDERMIDIS DNA BLD POS QL NAA+NON-PRB: NOT DETECTED
S LUGDUNENSIS DNA BLD POS QL NAA+NON-PRB: NOT DETECTED
S MALTOPHILIA DNA BLD POS QL NAA+NON-PRB: NOT DETECTED
S MARCESCENS DNA BLD POS NAA+NON-PROBE: NOT DETECTED
S PNEUM DNA BLD POS QL NAA+NON-PROBE: NOT DETECTED
S PYO DNA BLD POS QL NAA+NON-PROBE: NOT DETECTED
SALMONELLA DNA BLD POS QL NAA+NON-PROBE: NOT DETECTED
SERVICE CMNT-IMP: ABNORMAL
SPECIMEN DESCRIPTION: ABNORMAL
SPECIMEN DESCRIPTION: ABNORMAL
STREPTOCOCCUS DNA BLD POS NAA+NON-PROBE: NOT DETECTED

## 2024-05-22 PROCEDURE — 93010 ELECTROCARDIOGRAM REPORT: CPT | Performed by: INTERNAL MEDICINE

## 2024-12-02 NOTE — CONSULTS
Palliative Care Department  [phone]  Palliative Care Initial Consult  Provider Jennifer Jacinto, APRN - CNP     Marina Green  [id number]  Hospital Day: 1  Date of Initial Consult: 5/20/2024  Referring Provider: Stuart Archibald MD  Palliative Medicine was consulted for assistance with: Goals of care    HPI:   Marina Green is a 68 y.o. with a medical history of Anxiety, CAD, Carotid artery stenosis, Chronic back pain, COPD, Depression, GERD, Hyperlipidemia, Hypertension, Hyperthyroidism, Mitochondrial disease who was admitted on 5/20/2024 from home with a CHIEF COMPLAINT of SOB, diarrhea, abd pain.  Patient was admitted 5/19/2024 With complaints of fatigue and diarrhea.  For the last week she has been having profound watery diarrhea, with increased dizziness and lightheadedness.  She is also reporting generalized abdominal discomfort that is cramping in nature.  Patient has a history of pancreatic cancer with mets receiving chemotherapy 2 weeks ago.  Patient was a transfer from [facility]. At [facility], 5/20/2024 RRT called for Respiratory distress, hematemesis. CT of Chest-No evidence of pulmonary embolism. Multi lobar pneumonia with small bilateral pleural effusions. Multiple low-attenuation areas seen within the liver. There is biliary ductal dilatation.  See the report of the abdomen and pelvis for full details. CT of Pelvis- Diffuse wall thickening and inflammation throughout small and large bowel loops consistent with enterocolitis. Pancreatic mass with biliary and pancreatic ductal dilation as seen previously. Hepatic metastatic disease. Left portal vein thrombosis.  Probable occlusion of the splenic vein. DVT bilaterally in the femoral veins. Small bilateral pleural effusions. Consolidative airspace opacities bilaterally in the lung bases suggestive of multifocal pneumonia or neoplastic disease. Mild ascites.Tiny nonspecific soft tissue nodule or fluid collection in the 
Department of Internal Medicine  Infectious Diseases   Consult Note      Reason for Consult:  Septic shock       Requesting Physician: Dr Bernard         HISTORY OF PRESENT ILLNESS:      Pt is intubated and sedated at present . Discussed with pt's daughter and  . This is a 68 yrs old female with pancreatic cancer undergoing chemo  ( Abraxane / Gemcitabine ) , CAD, COPD, HTN,  presented with weakness, shortness of breath - had diarrhea, fever of 101 F  . She was tachycardic, hypotensive, hypoxic - pt was intubated,transferred to MICU . Pt was pancytopenic - vanco, zosyn given , diflucan continued for esophageal candidiasis .  CT chest - bilateral multifocal infiltrates        Past Medical History:      Past Medical History:   Diagnosis Date    Anxiety     CAD (coronary artery disease)     Carotid artery stenosis     Chronic back pain     Chronic obstructive pulmonary disease, unspecified COPD type (HCC) 01/05/2022    COPD (chronic obstructive pulmonary disease) (HCC)     Depression     GERD (gastroesophageal reflux disease)     Hyperlipidemia     Hypertension     Hyperthyroidism     Mitochondrial disease (HCC)     Myocardial infarction (HCC) 03/21/2022    Last Assessment & Plan:   Formatting of this note might be different from the original.  Assessment: 2014 had stent placed in RCA    Need for vaccination against Streptococcus pneumoniae 06/16/2022    Neuropathy          Past Surgical History:    Past Surgical History:   Procedure Laterality Date    ABDOMINAL EXPLORATION SURGERY      APPENDECTOMY      BREAST BIOPSY      CHOLECYSTECTOMY      CORONARY ANGIOPLASTY WITH STENT PLACEMENT  2014    CT NEEDLE BIOPSY LIVER PERCUTANEOUS  4/16/2024    CT NEEDLE BIOPSY LIVER PERCUTANEOUS 4/16/2024 Timothy Bradford MD [facility]    HYSTERECTOMY (CERVIX STATUS UNKNOWN)      NECK SURGERY      PAIN MANAGEMENT PROCEDURE N/A 02/07/2022    LUMBAR EPIDURAL STEROID INJECTION L3-4 WITH 80 DEPO performed by Austin Medrano MD at [facility] 
Report was  called to Facility and spoke to Philly Fletcher RN , AVS  faxed   
mcg/hr IntraVENous Continuous Stuart Archibald MD 5 mL/hr at 05/20/24 1212 50 mcg/hr at 05/20/24 1212    midazolam (VERSED) 100mg/100mL in NS infusion  1-10 mg/hr IntraVENous Continuous Stuart Acrhibald MD 3 mL/hr at 05/20/24 1212 3 mg/hr at 05/20/24 1212    filgrastim-aafi (NIVESTYM) injection 480 mcg  480 mcg SubCUTAneous Daily Stuart Archibald MD        0.9 % sodium chloride infusion   IntraVENous PRN Stuart Archibald MD        hydrocortisone sodium succinate PF (SOLU-CORTEF) injection 100 mg  100 mg IntraVENous Q8H Stuart Archibald MD   100 mg at 05/20/24 0928    chlorhexidine (PERIDEX) 0.12 % solution 15 mL  15 mL Mouth/Throat BID Prachi Kerr MD   15 mL at 05/20/24 0927    Polyvinyl Alcohol-Povidone PF (REFRESH) 1.4-0.6 % ophthalmic solution 1 drop  1 drop Both Eyes Q4H Prachi Kerr MD        Or    lubrifresh P.M. (artificial tears) ophthalmic ointment   Both Eyes Q4H Prachi Kerr MD   Given at 05/20/24 0928    sodium chloride flush 0.9 % injection 5-40 mL  5-40 mL IntraVENous 2 times per day Prachi Kerr MD   10 mL at 05/20/24 0933    sodium chloride flush 0.9 % injection 5-40 mL  5-40 mL IntraVENous PRN Prachi Kerr MD        0.9 % sodium chloride infusion   IntraVENous PRN Prachi Kerr MD        ondansetron (ZOFRAN-ODT) disintegrating tablet 4 mg  4 mg Oral Q8H PRN Prachi Kerr MD        Or    ondansetron (ZOFRAN) injection 4 mg  4 mg IntraVENous Q6H PRN Prachi Kerr MD        polyethylene glycol (GLYCOLAX) packet 17 g  17 g Oral Daily PRN Prachi Kerr MD        acetaminophen (TYLENOL) tablet 650 mg  650 mg Oral Q6H PRN Prachi Kerr MD        Or    acetaminophen (TYLENOL) suppository 650 mg  650 mg Rectal Q6H PRN Prachi Kerr MD        glucose chewable tablet 16 g  4 tablet Oral PRN Prachi Kerr MD        dextrose bolus 10% 125 mL  125 mL IntraVENous PRN Prachi Kerr MD        Or    dextrose bolus 10% 250 mL  250 mL IntraVENous PRN Prachi Kerr, 
low tidal volume lung protective strategy  Titrate Christopher-Synephrine for maps above 65  Perform NICOM, patient fluid responsive proceeded with getting additional 2 L of bolus LR  Hydrocortisone 100 mg every 8 hours  Broad-spectrum antibiotics, vancomycin and Zosyn and given the history of Candida esophagitis switch to Myco function per infectious disease recommendations or input deeply appreciated  Will transfuse platelets  Cycle lactic acid  Will obtain echocardiogram    Goals of care and code discussion was made with the family.  At this time family has agreed to make patient a DNR CCA.  During multidisciplinary team rounds the patient was seen, examined and discussed. This is confirmation that I have personally seen and examined the patient and that the key elements of the encounter were performed by me (> 85 % time).  The medications & laboratory data and imagery was discussed and adjusted where necessary. Key issues of the case were discussed among consultants.     This patient has a high probability of sudden clinically significant deterioration. I managed/supervised life or organ supporting interventions that required frequent physician assessment. I devoted my full attention to the direct care of this patient for the period of time indicated below. In addition to above, time was devoted to teaching and to any procedure.      NOTE: This report, in part or full, may have been transcribed using voice recognition software. Every effort was made to ensure accuracy; however, inadvertent computerized transcription errors may be present. Please excuse any transcriptional grammatical or spelling errors that may have escaped my editorial review.     Total critical care time caring for this patient with life threatening, unstable organ failure, including direct patient contact, management of life support systems, review of data including imaging and labs, discussions with other team members and physicians is at least 60 
- - -

## (undated) DEVICE — GLOVE ORANGE PI 7 1/2   MSG9075

## (undated) DEVICE — 6 ML SYRINGE LUER-LOCK TIP: Brand: MONOJECT

## (undated) DEVICE — LIMB HOLDER, WRIST/ANKLE: Brand: DEROYAL

## (undated) DEVICE — BLADE ES ELASTOMERIC COAT INSUL DURABLE BEND UPTO 90DEG

## (undated) DEVICE — COVER PRB W14XL147CM TELESCOPICALLY FLD EXT LEN CIV-FLEX

## (undated) DEVICE — HUMBLES LAPWRAP® (10/CASE): Brand: HUMBLES LAPWRAP®

## (undated) DEVICE — GAUZE,SPONGE,4"X4",16PLY,XRAY,STRL,LF: Brand: MEDLINE

## (undated) DEVICE — BOOT,SUTURE,STANDARD,YELLOW-IN-BLUE: Brand: MEDLINE

## (undated) DEVICE — 4-PORT MANIFOLD: Brand: NEPTUNE 2

## (undated) DEVICE — PACK,LAPAROTOMY,NO GOWNS: Brand: MEDLINE

## (undated) DEVICE — GOWN,SIRUS,NONRNF,SETINSLV,XL,20/CS: Brand: MEDLINE

## (undated) DEVICE — NDL CNTR 40CT FM MAG: Brand: MEDLINE INDUSTRIES, INC.

## (undated) DEVICE — 12 ML SYRINGE,LUER-LOCK TIP: Brand: MONOJECT

## (undated) DEVICE — COVER,LIGHT HANDLE,FLX,1/PK: Brand: MEDLINE INDUSTRIES, INC.

## (undated) DEVICE — APPLICATOR MEDICATED 26 CC SOLUTION HI LT ORNG CHLORAPREP

## (undated) DEVICE — WIPES SKIN CLOTH READYPREP 9 X 10.5 IN 2% CHLORHEX GLUCONATE CHG PREOP

## (undated) DEVICE — SYRINGE MED 10ML LUERLOCK TIP W/O SFTY DISP

## (undated) DEVICE — TRAY EPI SGL DOSE 18GA NDL CUST AULTMAN HOSP

## (undated) DEVICE — 3 ML SYRINGE LUER-LOCK TIP: Brand: MONOJECT

## (undated) DEVICE — ELECTRODE PT RET AD L9FT HI MOIST COND ADH HYDRGEL CORDED

## (undated) DEVICE — MARKER,SKIN,WI/RULER AND LABELS: Brand: MEDLINE

## (undated) DEVICE — BASIC DOUBLE BASIN 2-LF: Brand: MEDLINE INDUSTRIES, INC.

## (undated) DEVICE — LIQUIBAND RAPID ADHESIVE 36/CS 0.8ML: Brand: MEDLINE

## (undated) DEVICE — TOWEL,OR,DSP,ST,BLUE,STD,6/PK,12PK/CS: Brand: MEDLINE

## (undated) DEVICE — 3M™ RED DOT™ MONITORING ELECTRODE WITH FOAM TAPE AND STICKY GEL 2560, 50/BAG, 20/CASE, 72/PLT: Brand: RED DOT™

## (undated) DEVICE — BLADE,STAINLESS-STEEL,15,STRL,DISPOSABLE: Brand: MEDLINE

## (undated) DEVICE — GAUZE,SPONGE,4"X4",12PLY,STERILE,LF,2'S: Brand: MEDLINE

## (undated) DEVICE — NEEDLE HYPO 25GA L1.5IN BLU POLYPR HUB S STL REG BVL STR

## (undated) DEVICE — NEEDLE HYPO 18GA L1.5IN PNK POLYPR HUB S STL THN WALL FILL

## (undated) DEVICE — BANDAGE ADH W1XL3IN NAT FAB WVN FLX DURABLE N ADH PD SEAL

## (undated) DEVICE — NEEDLE HYPO 27GA L1.25IN GRY POLYPR HUB S STL REG BVL STR

## (undated) DEVICE — APPLICATOR MEDICATED 10.5 CC SOLUTION HI LT ORNG CHLORAPREP

## (undated) DEVICE — SYRINGE MED 10ML TRNSLUC BRL PLUNG BLK MRK POLYPR CTRL